# Patient Record
Sex: MALE | Race: WHITE | Employment: OTHER | URBAN - METROPOLITAN AREA
[De-identification: names, ages, dates, MRNs, and addresses within clinical notes are randomized per-mention and may not be internally consistent; named-entity substitution may affect disease eponyms.]

---

## 2017-08-21 ENCOUNTER — HOSPITAL ENCOUNTER (INPATIENT)
Age: 77
LOS: 3 days | Discharge: NURSING, REHAB OR LONG-TERM CARE | DRG: 470 | End: 2017-08-24
Attending: EMERGENCY MEDICINE | Admitting: HOSPITALIST
Payer: MEDICARE

## 2017-08-21 ENCOUNTER — APPOINTMENT (OUTPATIENT)
Dept: GENERAL RADIOLOGY | Age: 77
DRG: 470 | End: 2017-08-21
Attending: EMERGENCY MEDICINE
Payer: MEDICARE

## 2017-08-21 ENCOUNTER — ANESTHESIA (OUTPATIENT)
Dept: SURGERY | Age: 77
DRG: 470 | End: 2017-08-21
Payer: MEDICARE

## 2017-08-21 ENCOUNTER — ANESTHESIA EVENT (OUTPATIENT)
Dept: SURGERY | Age: 77
DRG: 470 | End: 2017-08-21
Payer: MEDICARE

## 2017-08-21 ENCOUNTER — APPOINTMENT (OUTPATIENT)
Dept: GENERAL RADIOLOGY | Age: 77
DRG: 470 | End: 2017-08-21
Attending: ORTHOPAEDIC SURGERY
Payer: MEDICARE

## 2017-08-21 DIAGNOSIS — S72.001A CLOSED RIGHT HIP FRACTURE, INITIAL ENCOUNTER (HCC): Primary | ICD-10-CM

## 2017-08-21 PROBLEM — S72.009A HIP FX (HCC): Status: ACTIVE | Noted: 2017-08-21

## 2017-08-21 LAB
ABO + RH BLD: NORMAL
ANION GAP SERPL CALC-SCNC: 11 MMOL/L (ref 7–16)
BACTERIA SPEC CULT: NORMAL
BASOPHILS # BLD: 0 K/UL (ref 0–0.2)
BASOPHILS NFR BLD: 0 % (ref 0–2)
BLOOD GROUP ANTIBODIES SERPL: NORMAL
BUN SERPL-MCNC: 19 MG/DL (ref 8–23)
CALCIUM SERPL-MCNC: 8.8 MG/DL (ref 8.3–10.4)
CALCIUM SERPL-MCNC: 8.9 MG/DL (ref 8.3–10.4)
CHLORIDE SERPL-SCNC: 111 MMOL/L (ref 98–107)
CO2 SERPL-SCNC: 24 MMOL/L (ref 21–32)
CREAT SERPL-MCNC: 1.12 MG/DL (ref 0.8–1.5)
DIFFERENTIAL METHOD BLD: ABNORMAL
EOSINOPHIL # BLD: 0.1 K/UL (ref 0–0.8)
EOSINOPHIL NFR BLD: 2 % (ref 0.5–7.8)
ERYTHROCYTE [DISTWIDTH] IN BLOOD BY AUTOMATED COUNT: 13.5 % (ref 11.9–14.6)
GLUCOSE SERPL-MCNC: 87 MG/DL (ref 65–100)
HCT VFR BLD AUTO: 37.9 % (ref 41.1–50.3)
HGB BLD-MCNC: 13.6 G/DL (ref 13.6–17.2)
IMM GRANULOCYTES # BLD: 0 K/UL (ref 0–0.5)
IMM GRANULOCYTES NFR BLD: 0.6 % (ref 0–5)
INR PPP: 1 (ref 0.9–1.2)
LYMPHOCYTES # BLD: 1.2 K/UL (ref 0.5–4.6)
LYMPHOCYTES NFR BLD: 18 % (ref 13–44)
MCH RBC QN AUTO: 35.4 PG (ref 26.1–32.9)
MCHC RBC AUTO-ENTMCNC: 35.9 G/DL (ref 31.4–35)
MCV RBC AUTO: 98.7 FL (ref 79.6–97.8)
MONOCYTES # BLD: 0.6 K/UL (ref 0.1–1.3)
MONOCYTES NFR BLD: 10 % (ref 4–12)
NEUTS SEG # BLD: 4.7 K/UL (ref 1.7–8.2)
NEUTS SEG NFR BLD: 69 % (ref 43–78)
PLATELET # BLD AUTO: 238 K/UL (ref 150–450)
PMV BLD AUTO: 9.8 FL (ref 10.8–14.1)
POTASSIUM SERPL-SCNC: 4.3 MMOL/L (ref 3.5–5.1)
PREALB SERPL-MCNC: 25 MG/DL (ref 18–35.7)
PROTHROMBIN TIME: 10.8 SEC (ref 9.6–12)
PTH-INTACT SERPL-MCNC: 55.8 PG/ML (ref 14–72)
RBC # BLD AUTO: 3.84 M/UL (ref 4.23–5.67)
SERVICE CMNT-IMP: NORMAL
SODIUM SERPL-SCNC: 146 MMOL/L (ref 136–145)
SPECIMEN EXP DATE BLD: NORMAL
WBC # BLD AUTO: 6.8 K/UL (ref 4.3–11.1)

## 2017-08-21 PROCEDURE — 83970 ASSAY OF PARATHORMONE: CPT | Performed by: ORTHOPAEDIC SURGERY

## 2017-08-21 PROCEDURE — 85025 COMPLETE CBC W/AUTO DIFF WBC: CPT | Performed by: EMERGENCY MEDICINE

## 2017-08-21 PROCEDURE — 77030020782 HC GWN BAIR PAWS FLX 3M -B: Performed by: ANESTHESIOLOGY

## 2017-08-21 PROCEDURE — 77030018836 HC SOL IRR NACL ICUM -A: Performed by: ORTHOPAEDIC SURGERY

## 2017-08-21 PROCEDURE — 81003 URINALYSIS AUTO W/O SCOPE: CPT | Performed by: EMERGENCY MEDICINE

## 2017-08-21 PROCEDURE — 77030002966 HC SUT PDS J&J -A: Performed by: ORTHOPAEDIC SURGERY

## 2017-08-21 PROCEDURE — 76210000017 HC OR PH I REC 1.5 TO 2 HR: Performed by: ORTHOPAEDIC SURGERY

## 2017-08-21 PROCEDURE — 74011250637 HC RX REV CODE- 250/637: Performed by: EMERGENCY MEDICINE

## 2017-08-21 PROCEDURE — 77030018836 HC SOL IRR NACL ICUM -A

## 2017-08-21 PROCEDURE — 77030007880 HC KT SPN EPDRL BBMI -B: Performed by: ANESTHESIOLOGY

## 2017-08-21 PROCEDURE — L1830 KO IMMOB CANVAS LONG PRE OTS: HCPCS | Performed by: ORTHOPAEDIC SURGERY

## 2017-08-21 PROCEDURE — 74011250637 HC RX REV CODE- 250/637: Performed by: NURSE PRACTITIONER

## 2017-08-21 PROCEDURE — 77030032490 HC SLV COMPR SCD KNE COVD -B

## 2017-08-21 PROCEDURE — 74011000302 HC RX REV CODE- 302: Performed by: NURSE PRACTITIONER

## 2017-08-21 PROCEDURE — 77030003665 HC NDL SPN BBMI -A: Performed by: ANESTHESIOLOGY

## 2017-08-21 PROCEDURE — 82306 VITAMIN D 25 HYDROXY: CPT | Performed by: ORTHOPAEDIC SURGERY

## 2017-08-21 PROCEDURE — 87641 MR-STAPH DNA AMP PROBE: CPT | Performed by: ORTHOPAEDIC SURGERY

## 2017-08-21 PROCEDURE — 85610 PROTHROMBIN TIME: CPT | Performed by: EMERGENCY MEDICINE

## 2017-08-21 PROCEDURE — 77030008467 HC STPLR SKN COVD -B: Performed by: ORTHOPAEDIC SURGERY

## 2017-08-21 PROCEDURE — 71010 XR CHEST SNGL V: CPT

## 2017-08-21 PROCEDURE — 86900 BLOOD TYPING SEROLOGIC ABO: CPT | Performed by: EMERGENCY MEDICINE

## 2017-08-21 PROCEDURE — 74011250636 HC RX REV CODE- 250/636: Performed by: ORTHOPAEDIC SURGERY

## 2017-08-21 PROCEDURE — 77030011640 HC PAD GRND REM COVD -A: Performed by: ORTHOPAEDIC SURGERY

## 2017-08-21 PROCEDURE — 77030010507 HC ADH SKN DERMBND J&J -B: Performed by: ORTHOPAEDIC SURGERY

## 2017-08-21 PROCEDURE — 76010000149 HC OR TIME 1 TO 1.5 HR: Performed by: ORTHOPAEDIC SURGERY

## 2017-08-21 PROCEDURE — 74011250637 HC RX REV CODE- 250/637: Performed by: ORTHOPAEDIC SURGERY

## 2017-08-21 PROCEDURE — 74011000250 HC RX REV CODE- 250

## 2017-08-21 PROCEDURE — 84134 ASSAY OF PREALBUMIN: CPT | Performed by: ORTHOPAEDIC SURGERY

## 2017-08-21 PROCEDURE — 77030018673: Performed by: ORTHOPAEDIC SURGERY

## 2017-08-21 PROCEDURE — 86580 TB INTRADERMAL TEST: CPT | Performed by: NURSE PRACTITIONER

## 2017-08-21 PROCEDURE — 73552 X-RAY EXAM OF FEMUR 2/>: CPT

## 2017-08-21 PROCEDURE — C1776 JOINT DEVICE (IMPLANTABLE): HCPCS | Performed by: ORTHOPAEDIC SURGERY

## 2017-08-21 PROCEDURE — 77030013727 HC IRR FAN PULSVC ZIMM -B: Performed by: ORTHOPAEDIC SURGERY

## 2017-08-21 PROCEDURE — 74011250636 HC RX REV CODE- 250/636

## 2017-08-21 PROCEDURE — 77030002933 HC SUT MCRYL J&J -A: Performed by: ORTHOPAEDIC SURGERY

## 2017-08-21 PROCEDURE — 77030006835 HC BLD SAW SAG STRY -B: Performed by: ORTHOPAEDIC SURGERY

## 2017-08-21 PROCEDURE — 77030005518 HC CATH URETH FOL 2W BARD -B

## 2017-08-21 PROCEDURE — 0SRR0JA REPLACEMENT OF RIGHT HIP JOINT, FEMORAL SURFACE WITH SYNTHETIC SUBSTITUTE, UNCEMENTED, OPEN APPROACH: ICD-10-PCS | Performed by: ORTHOPAEDIC SURGERY

## 2017-08-21 PROCEDURE — 77030036696 HC BOOT TRACT BUCKS S2SG -A

## 2017-08-21 PROCEDURE — 99284 EMERGENCY DEPT VISIT MOD MDM: CPT | Performed by: EMERGENCY MEDICINE

## 2017-08-21 PROCEDURE — 74011250636 HC RX REV CODE- 250/636: Performed by: NURSE PRACTITIONER

## 2017-08-21 PROCEDURE — 77030002937 HC SUT MERS J&J -B: Performed by: ORTHOPAEDIC SURGERY

## 2017-08-21 PROCEDURE — 80048 BASIC METABOLIC PNL TOTAL CA: CPT | Performed by: EMERGENCY MEDICINE

## 2017-08-21 PROCEDURE — 76060000034 HC ANESTHESIA 1.5 TO 2 HR: Performed by: ORTHOPAEDIC SURGERY

## 2017-08-21 PROCEDURE — 77030029883 HC RETRV SUT ARTHSCP HOFFE BEAT -B: Performed by: ORTHOPAEDIC SURGERY

## 2017-08-21 PROCEDURE — 73502 X-RAY EXAM HIP UNI 2-3 VIEWS: CPT

## 2017-08-21 PROCEDURE — 65270000029 HC RM PRIVATE

## 2017-08-21 DEVICE — STEM FEM SZ 15 L165MM NK L40.3MM 50MM HI OFFSET 125DEG HIP: Type: IMPLANTABLE DEVICE | Site: FEMUR | Status: FUNCTIONAL

## 2017-08-21 DEVICE — HEAD BPLR OD53MM ID28MM FEM HIP SELF CNTR: Type: IMPLANTABLE DEVICE | Site: FEMUR | Status: FUNCTIONAL

## 2017-08-21 DEVICE — HEAD FEM DIA28MM +5MM OFFSET STD 12/14 TAPR ARTC EZ HIP MTL: Type: IMPLANTABLE DEVICE | Site: FEMUR | Status: FUNCTIONAL

## 2017-08-21 RX ORDER — SODIUM CHLORIDE 9 MG/ML
2000 INJECTION, SOLUTION INTRAVENOUS CONTINUOUS
Status: DISCONTINUED | OUTPATIENT
Start: 2017-08-21 | End: 2017-08-22

## 2017-08-21 RX ORDER — CEFAZOLIN SODIUM IN 0.9 % NACL 2 G/50 ML
2 INTRAVENOUS SOLUTION, PIGGYBACK (ML) INTRAVENOUS
Status: DISCONTINUED | OUTPATIENT
Start: 2017-08-21 | End: 2017-08-21 | Stop reason: SDUPTHER

## 2017-08-21 RX ORDER — DIPHENHYDRAMINE HYDROCHLORIDE 50 MG/ML
12.5 INJECTION, SOLUTION INTRAMUSCULAR; INTRAVENOUS
Status: DISCONTINUED | OUTPATIENT
Start: 2017-08-21 | End: 2017-08-21 | Stop reason: HOSPADM

## 2017-08-21 RX ORDER — NALOXONE HYDROCHLORIDE 0.4 MG/ML
0.1 INJECTION, SOLUTION INTRAMUSCULAR; INTRAVENOUS; SUBCUTANEOUS
Status: DISCONTINUED | OUTPATIENT
Start: 2017-08-21 | End: 2017-08-21 | Stop reason: HOSPADM

## 2017-08-21 RX ORDER — HYDROMORPHONE HYDROCHLORIDE 2 MG/ML
0.5 INJECTION, SOLUTION INTRAMUSCULAR; INTRAVENOUS; SUBCUTANEOUS
Status: DISCONTINUED | OUTPATIENT
Start: 2017-08-21 | End: 2017-08-21 | Stop reason: HOSPADM

## 2017-08-21 RX ORDER — MIDAZOLAM HYDROCHLORIDE 1 MG/ML
2 INJECTION, SOLUTION INTRAMUSCULAR; INTRAVENOUS
Status: CANCELLED | OUTPATIENT
Start: 2017-08-21

## 2017-08-21 RX ORDER — FERROUS SULFATE, DRIED 160(50) MG
1 TABLET, EXTENDED RELEASE ORAL
Status: DISCONTINUED | OUTPATIENT
Start: 2017-08-22 | End: 2017-08-24 | Stop reason: HOSPADM

## 2017-08-21 RX ORDER — ACETAMINOPHEN 325 MG/1
650 TABLET ORAL EVERY 8 HOURS
Status: DISCONTINUED | OUTPATIENT
Start: 2017-08-21 | End: 2017-08-21 | Stop reason: HOSPADM

## 2017-08-21 RX ORDER — SODIUM CHLORIDE, SODIUM LACTATE, POTASSIUM CHLORIDE, CALCIUM CHLORIDE 600; 310; 30; 20 MG/100ML; MG/100ML; MG/100ML; MG/100ML
75 INJECTION, SOLUTION INTRAVENOUS CONTINUOUS
Status: DISCONTINUED | OUTPATIENT
Start: 2017-08-21 | End: 2017-08-21 | Stop reason: HOSPADM

## 2017-08-21 RX ORDER — TRAMADOL HYDROCHLORIDE 50 MG/1
50 TABLET ORAL
Status: DISCONTINUED | OUTPATIENT
Start: 2017-08-21 | End: 2017-08-21 | Stop reason: SDUPTHER

## 2017-08-21 RX ORDER — SODIUM CHLORIDE 0.9 % (FLUSH) 0.9 %
5-10 SYRINGE (ML) INJECTION AS NEEDED
Status: DISCONTINUED | OUTPATIENT
Start: 2017-08-21 | End: 2017-08-21 | Stop reason: HOSPADM

## 2017-08-21 RX ORDER — MAG HYDROX/ALUMINUM HYD/SIMETH 200-200-20
30 SUSPENSION, ORAL (FINAL DOSE FORM) ORAL
Status: DISCONTINUED | OUTPATIENT
Start: 2017-08-21 | End: 2017-08-24 | Stop reason: HOSPADM

## 2017-08-21 RX ORDER — ONDANSETRON 2 MG/ML
4 INJECTION INTRAMUSCULAR; INTRAVENOUS
Status: DISCONTINUED | OUTPATIENT
Start: 2017-08-21 | End: 2017-08-21 | Stop reason: HOSPADM

## 2017-08-21 RX ORDER — MORPHINE SULFATE 2 MG/ML
4 INJECTION, SOLUTION INTRAMUSCULAR; INTRAVENOUS ONCE
Status: COMPLETED | OUTPATIENT
Start: 2017-08-21 | End: 2017-08-21

## 2017-08-21 RX ORDER — RANOLAZINE 500 MG/1
1000 TABLET, EXTENDED RELEASE ORAL EVERY 12 HOURS
Status: DISCONTINUED | OUTPATIENT
Start: 2017-08-21 | End: 2017-08-24 | Stop reason: HOSPADM

## 2017-08-21 RX ORDER — HYDROMORPHONE HYDROCHLORIDE 1 MG/ML
0.5 INJECTION, SOLUTION INTRAMUSCULAR; INTRAVENOUS; SUBCUTANEOUS
Status: DISCONTINUED | OUTPATIENT
Start: 2017-08-21 | End: 2017-08-24 | Stop reason: HOSPADM

## 2017-08-21 RX ORDER — SODIUM CHLORIDE, SODIUM LACTATE, POTASSIUM CHLORIDE, CALCIUM CHLORIDE 600; 310; 30; 20 MG/100ML; MG/100ML; MG/100ML; MG/100ML
75 INJECTION, SOLUTION INTRAVENOUS CONTINUOUS
Status: DISCONTINUED | OUTPATIENT
Start: 2017-08-21 | End: 2017-08-22

## 2017-08-21 RX ORDER — ONDANSETRON 2 MG/ML
4 INJECTION INTRAMUSCULAR; INTRAVENOUS
Status: DISCONTINUED | OUTPATIENT
Start: 2017-08-21 | End: 2017-08-22 | Stop reason: SDUPTHER

## 2017-08-21 RX ORDER — LIDOCAINE HYDROCHLORIDE 10 MG/ML
0.1 INJECTION INFILTRATION; PERINEURAL AS NEEDED
Status: DISCONTINUED | OUTPATIENT
Start: 2017-08-21 | End: 2017-08-21 | Stop reason: HOSPADM

## 2017-08-21 RX ORDER — SODIUM CHLORIDE 0.9 % (FLUSH) 0.9 %
5-10 SYRINGE (ML) INJECTION AS NEEDED
Status: DISCONTINUED | OUTPATIENT
Start: 2017-08-21 | End: 2017-08-24 | Stop reason: HOSPADM

## 2017-08-21 RX ORDER — ACETAMINOPHEN 325 MG/1
650 TABLET ORAL EVERY 8 HOURS
Status: DISCONTINUED | OUTPATIENT
Start: 2017-08-22 | End: 2017-08-22 | Stop reason: SDUPTHER

## 2017-08-21 RX ORDER — BUPIVACAINE HYDROCHLORIDE 7.5 MG/ML
INJECTION, SOLUTION INTRASPINAL AS NEEDED
Status: DISCONTINUED | OUTPATIENT
Start: 2017-08-21 | End: 2017-08-21 | Stop reason: HOSPADM

## 2017-08-21 RX ORDER — TRAMADOL HYDROCHLORIDE 50 MG/1
50 TABLET ORAL
Status: DISCONTINUED | OUTPATIENT
Start: 2017-08-21 | End: 2017-08-24 | Stop reason: HOSPADM

## 2017-08-21 RX ORDER — SODIUM CHLORIDE, SODIUM LACTATE, POTASSIUM CHLORIDE, CALCIUM CHLORIDE 600; 310; 30; 20 MG/100ML; MG/100ML; MG/100ML; MG/100ML
75 INJECTION, SOLUTION INTRAVENOUS CONTINUOUS
Status: CANCELLED | OUTPATIENT
Start: 2017-08-21 | End: 2017-08-22

## 2017-08-21 RX ORDER — PROPOFOL 10 MG/ML
INJECTION, EMULSION INTRAVENOUS AS NEEDED
Status: DISCONTINUED | OUTPATIENT
Start: 2017-08-21 | End: 2017-08-21 | Stop reason: HOSPADM

## 2017-08-21 RX ORDER — CEFAZOLIN SODIUM IN 0.9 % NACL 2 G/50 ML
2 INTRAVENOUS SOLUTION, PIGGYBACK (ML) INTRAVENOUS
Status: COMPLETED | OUTPATIENT
Start: 2017-08-21 | End: 2017-08-21

## 2017-08-21 RX ORDER — PROPOFOL 10 MG/ML
INJECTION, EMULSION INTRAVENOUS
Status: DISCONTINUED | OUTPATIENT
Start: 2017-08-21 | End: 2017-08-21 | Stop reason: HOSPADM

## 2017-08-21 RX ORDER — ATORVASTATIN CALCIUM 40 MG/1
80 TABLET, FILM COATED ORAL DAILY
Status: DISCONTINUED | OUTPATIENT
Start: 2017-08-22 | End: 2017-08-24 | Stop reason: HOSPADM

## 2017-08-21 RX ORDER — OXYCODONE HYDROCHLORIDE 5 MG/1
10 TABLET ORAL
Status: DISCONTINUED | OUTPATIENT
Start: 2017-08-21 | End: 2017-08-21 | Stop reason: HOSPADM

## 2017-08-21 RX ORDER — ONDANSETRON 2 MG/ML
4 INJECTION INTRAMUSCULAR; INTRAVENOUS
Status: DISCONTINUED | OUTPATIENT
Start: 2017-08-21 | End: 2017-08-24 | Stop reason: HOSPADM

## 2017-08-21 RX ORDER — OXYCODONE HYDROCHLORIDE 5 MG/1
5 TABLET ORAL
Status: DISCONTINUED | OUTPATIENT
Start: 2017-08-21 | End: 2017-08-21 | Stop reason: HOSPADM

## 2017-08-21 RX ORDER — SODIUM CHLORIDE 0.9 % (FLUSH) 0.9 %
5-10 SYRINGE (ML) INJECTION EVERY 8 HOURS
Status: DISCONTINUED | OUTPATIENT
Start: 2017-08-21 | End: 2017-08-21 | Stop reason: HOSPADM

## 2017-08-21 RX ORDER — ACETAMINOPHEN 325 MG/1
650 TABLET ORAL EVERY 8 HOURS
Status: DISCONTINUED | OUTPATIENT
Start: 2017-08-21 | End: 2017-08-24 | Stop reason: HOSPADM

## 2017-08-21 RX ORDER — ENOXAPARIN SODIUM 100 MG/ML
30 INJECTION SUBCUTANEOUS EVERY 24 HOURS
Status: DISCONTINUED | OUTPATIENT
Start: 2017-08-22 | End: 2017-08-24 | Stop reason: HOSPADM

## 2017-08-21 RX ORDER — FLUMAZENIL 0.1 MG/ML
0.2 INJECTION INTRAVENOUS AS NEEDED
Status: DISCONTINUED | OUTPATIENT
Start: 2017-08-21 | End: 2017-08-21 | Stop reason: HOSPADM

## 2017-08-21 RX ORDER — SODIUM CHLORIDE 0.9 % (FLUSH) 0.9 %
5-10 SYRINGE (ML) INJECTION AS NEEDED
Status: DISCONTINUED | OUTPATIENT
Start: 2017-08-21 | End: 2017-08-22 | Stop reason: SDUPTHER

## 2017-08-21 RX ORDER — SODIUM CHLORIDE, SODIUM LACTATE, POTASSIUM CHLORIDE, CALCIUM CHLORIDE 600; 310; 30; 20 MG/100ML; MG/100ML; MG/100ML; MG/100ML
INJECTION, SOLUTION INTRAVENOUS
Status: DISCONTINUED | OUTPATIENT
Start: 2017-08-21 | End: 2017-08-21 | Stop reason: HOSPADM

## 2017-08-21 RX ORDER — SODIUM CHLORIDE 0.9 % (FLUSH) 0.9 %
5-10 SYRINGE (ML) INJECTION EVERY 8 HOURS
Status: DISCONTINUED | OUTPATIENT
Start: 2017-08-21 | End: 2017-08-24 | Stop reason: HOSPADM

## 2017-08-21 RX ORDER — SODIUM CHLORIDE, SODIUM LACTATE, POTASSIUM CHLORIDE, CALCIUM CHLORIDE 600; 310; 30; 20 MG/100ML; MG/100ML; MG/100ML; MG/100ML
75 INJECTION, SOLUTION INTRAVENOUS
Status: COMPLETED | OUTPATIENT
Start: 2017-08-21 | End: 2017-08-21

## 2017-08-21 RX ORDER — DEXTROSE MONOHYDRATE 50 MG/ML
75 INJECTION, SOLUTION INTRAVENOUS CONTINUOUS
Status: DISCONTINUED | OUTPATIENT
Start: 2017-08-21 | End: 2017-08-22

## 2017-08-21 RX ORDER — METOPROLOL SUCCINATE 25 MG/1
12.5 TABLET, EXTENDED RELEASE ORAL DAILY
Status: DISCONTINUED | OUTPATIENT
Start: 2017-08-22 | End: 2017-08-24 | Stop reason: HOSPADM

## 2017-08-21 RX ORDER — OXYCODONE HYDROCHLORIDE 5 MG/1
5 TABLET ORAL
Status: DISCONTINUED | OUTPATIENT
Start: 2017-08-21 | End: 2017-08-24 | Stop reason: HOSPADM

## 2017-08-21 RX ORDER — PANTOPRAZOLE SODIUM 40 MG/1
40 TABLET, DELAYED RELEASE ORAL
Status: DISCONTINUED | OUTPATIENT
Start: 2017-08-22 | End: 2017-08-24 | Stop reason: HOSPADM

## 2017-08-21 RX ORDER — SODIUM CHLORIDE 0.9 % (FLUSH) 0.9 %
5-10 SYRINGE (ML) INJECTION EVERY 8 HOURS
Status: DISCONTINUED | OUTPATIENT
Start: 2017-08-21 | End: 2017-08-22 | Stop reason: SDUPTHER

## 2017-08-21 RX ORDER — OXYCODONE HYDROCHLORIDE 5 MG/1
5 TABLET ORAL
Status: DISCONTINUED | OUTPATIENT
Start: 2017-08-21 | End: 2017-08-22

## 2017-08-21 RX ADMIN — BUPIVACAINE HYDROCHLORIDE 1.9 ML: 7.5 INJECTION, SOLUTION INTRASPINAL at 17:25

## 2017-08-21 RX ADMIN — ACETAMINOPHEN 650 MG: 325 TABLET ORAL at 21:09

## 2017-08-21 RX ADMIN — MORPHINE SULFATE 4 MG: 2 INJECTION, SOLUTION INTRAMUSCULAR; INTRAVENOUS at 14:36

## 2017-08-21 RX ADMIN — PROPOFOL 40 MG: 10 INJECTION, EMULSION INTRAVENOUS at 17:22

## 2017-08-21 RX ADMIN — TUBERCULIN PURIFIED PROTEIN DERIVATIVE 5 UNITS: 5 INJECTION INTRADERMAL at 16:03

## 2017-08-21 RX ADMIN — Medication 5 ML: at 21:12

## 2017-08-21 RX ADMIN — CEFAZOLIN 2 G: 1 INJECTION, POWDER, FOR SOLUTION INTRAMUSCULAR; INTRAVENOUS; PARENTERAL at 17:24

## 2017-08-21 RX ADMIN — ONDANSETRON 4 MG: 2 INJECTION INTRAMUSCULAR; INTRAVENOUS at 22:20

## 2017-08-21 RX ADMIN — RANOLAZINE 1000 MG: 500 TABLET, FILM COATED, EXTENDED RELEASE ORAL at 21:09

## 2017-08-21 RX ADMIN — SODIUM CHLORIDE, SODIUM LACTATE, POTASSIUM CHLORIDE, CALCIUM CHLORIDE: 600; 310; 30; 20 INJECTION, SOLUTION INTRAVENOUS at 17:30

## 2017-08-21 RX ADMIN — OXYCODONE HYDROCHLORIDE 5 MG: 5 TABLET ORAL at 16:04

## 2017-08-21 RX ADMIN — Medication 5 ML: at 16:04

## 2017-08-21 RX ADMIN — SODIUM CHLORIDE 2000 ML: 900 INJECTION, SOLUTION INTRAVENOUS at 16:07

## 2017-08-21 RX ADMIN — SODIUM CHLORIDE, SODIUM LACTATE, POTASSIUM CHLORIDE, CALCIUM CHLORIDE: 600; 310; 30; 20 INJECTION, SOLUTION INTRAVENOUS at 17:16

## 2017-08-21 RX ADMIN — PROPOFOL 50 MCG/KG/MIN: 10 INJECTION, EMULSION INTRAVENOUS at 17:23

## 2017-08-21 RX ADMIN — SODIUM CHLORIDE, SODIUM LACTATE, POTASSIUM CHLORIDE, AND CALCIUM CHLORIDE 75 ML/HR: 600; 310; 30; 20 INJECTION, SOLUTION INTRAVENOUS at 17:06

## 2017-08-21 RX ADMIN — SODIUM CHLORIDE 1000 ML: 900 INJECTION, SOLUTION INTRAVENOUS at 14:36

## 2017-08-21 RX ADMIN — ACETAMINOPHEN 650 MG: 325 TABLET ORAL at 16:04

## 2017-08-21 NOTE — ED PROVIDER NOTES
HPI Comments: 69year-old white male presents with right hip pain. He states that he was opening his car radiator when it spewed water causing him to fall onto his right hip. Since then he has had pain with movement and has been unable to bear weight. No head trauma. No neck pain or back pain. No other complaints at this time. No history of hip fractures. Patient is a 69 y.o. male presenting with hip pain. The history is provided by the patient. Hip Injury    Pertinent negatives include no back pain and no neck pain. 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    CAD (coronary artery disease)     Coronary atherosclerosis of native coronary artery 11/2/2015    Dyspnea/Shortness of breath 11/2/2015    Elevated prostate specific antigen (PSA)     Esophageal reflux     Extremity atherosclerosis with intermittent claudication ([facility] [address] 11/2/2015    Heart disease, unspecified     Hyperlipidemia 11/2/2015    Hypertrophy of prostate with urinary obstruction and other lower urinary tract symptoms (LUTS)     Pure hypercholesterolemia        Past Surgical History:   Procedure Laterality Date    HX CHOLECYSTECTOMY      HX CORONARY ARTERY BYPASS GRAFT      HX KNEE REPLACEMENT Left     total    HX ORTHOPAEDIC      knee surgury         Family History:   Problem Relation Age of Onset    Cancer Mother     Cancer Father     Heart Disease Father     Elevated Lipids Father        Social History     Social History    Marital status:      Spouse name: N/A    Number of children: N/A    Years of education: N/A     Occupational History    Not on file.      Social History Main Topics    Smoking status: Former Smoker     Quit date: 1/1/1972    Smokeless tobacco: Not on file    Alcohol use No    Drug use: Not on file    Sexual activity: Not on file     Other Topics Concern    Not on file     Social History Narrative         ALLERGIES: Phenergan [promethazine]    Review of Systems   Constitutional: Negative for fever. HENT: Negative for congestion. Respiratory: Negative for cough and shortness of breath. Cardiovascular: Negative for chest pain. Gastrointestinal: Negative for abdominal pain, nausea and vomiting. Genitourinary: Negative for dysuria. Musculoskeletal: Negative for back pain and neck pain. Skin: Negative for rash. Neurological: Negative for headaches. Vitals:    08/21/17 1047   BP: 134/79   Pulse: 69   Resp: 18   Temp: 98.2 °F (36.8 °C)   SpO2: 96%   Weight: 88.5 kg (195 lb)   Height: 5' 8\" (1.727 m)            Physical Exam   Constitutional: He is oriented to person, place, and time. He appears well-developed and well-nourished. No distress. HENT:   Head: Normocephalic and atraumatic. Mouth/Throat: Oropharynx is clear and moist.   Eyes: Conjunctivae are normal. Pupils are equal, round, and reactive to light. Neck: Normal range of motion. Cardiovascular: Normal rate and regular rhythm. No murmur heard. Pulmonary/Chest: Effort normal and breath sounds normal. He has no wheezes. Abdominal: Soft. He exhibits no distension. There is no tenderness. Musculoskeletal:   Right hip has decreased range of motion. Right leg is approximately 2 centimeters shorter than the left. Good distal pulses and sensation. Neurological: He is alert and oriented to person, place, and time. He exhibits normal muscle tone. Coordination normal.   Skin: Skin is warm and dry. Psychiatric: He has a normal mood and affect. Nursing note and vitals reviewed. MDM  Number of Diagnoses or Management Options  Diagnosis management comments: X-ray confirms right femoral neck fracture. Preoperative lab work unremarkable. EKG shows normal sinus rhythm with occasional PVC. Preoperative chest x-ray is normal.  Patient has declined further pain medicine while being observed in the emergency department.   Orthopedics and hospitalist have been consulted.        Amount and/or Complexity of Data Reviewed  Clinical lab tests: ordered and reviewed  Tests in the radiology section of CPT®: ordered and reviewed  Discuss the patient with other providers: yes  Independent visualization of images, tracings, or specimens: yes    Risk of Complications, Morbidity, and/or Mortality  Presenting problems: moderate  Diagnostic procedures: moderate  Management options: moderate      ED Course       Procedures

## 2017-08-21 NOTE — ANESTHESIA POSTPROCEDURE EVALUATION
Post-Anesthesia Evaluation and Assessment    Patient: Alirio Carpio MRN: [id number]  SSN: [id number]    YOB: 1940  Age: 69 y.o. Sex: male       Cardiovascular Function/Vital Signs  Visit Vitals    /58    Pulse 77    Temp 36.6 °C (97.8 °F)    Resp 12    Ht 5' 8\" (1.727 m)    Wt 88.5 kg (195 lb)    SpO2 90%    BMI 29.65 kg/m2       Patient is status post spinal anesthesia for Procedure(s):  Right HIP HEMIARTHROPLASTY. Nausea/Vomiting: None    Postoperative hydration reviewed and adequate. Pain:  Pain Scale 1: Numeric (0 - 10) (08/21/17 1905)  Pain Intensity 1: 0 (08/21/17 1905)   Managed    Neurological Status: At baseline    Mental Status and Level of Consciousness: Arousable    Pulmonary Status:   O2 Device: Room air (08/21/17 1905)   Adequate oxygenation and airway patent    Complications related to anesthesia: None    Post-anesthesia assessment completed.  No concerns    Signed By: Valentino Katz MD     August 21, 2017

## 2017-08-21 NOTE — ED TRIAGE NOTES
Pt arrives per EMS after ground level fall. Complains of right hip pain. Pt can move leg but causes pain.

## 2017-08-21 NOTE — PROGRESS NOTES
ORTHO:    PATIENT TO BE ADMITTED BY HOSPITALIST FOR RIGHT HIP FRACTURE TO ROOM 723. SURGERY SCHEDULED FOR TODAY WITH DR. AMADOR IF CLEARED FOR SURGERY. PLEASE KEEP NPO.

## 2017-08-21 NOTE — ANESTHESIA PROCEDURE NOTES
Spinal Block    Start time: 8/21/2017 5:22 PM  End time: 8/21/2017 5:26 PM  Performed by: Tresa Marion  Authorized by: Tresa Marion     Pre-procedure:   Indications: primary anesthetic  Preanesthetic Checklist: patient identified, risks and benefits discussed, anesthesia consent, site marked, patient being monitored and timeout performed    Timeout Time: 17:21          Spinal Block:   Patient Position:  Right lateral decubitus  Prep Region:  Lumbar  Prep: chlorhexidine      Location:  L3-4  Technique:  Single shot  Local:  Lidocaine 1%  Local Dose (mL):  3    Needle:   Needle Type:  Pencil-tip  Needle Gauge:  25 G  Attempts:  1      Events: CSF confirmed, no blood with aspiration and no paresthesia        Assessment:  Insertion:  Uncomplicated  Patient tolerance:  Patient tolerated the procedure well with no immediate complications

## 2017-08-21 NOTE — PROGRESS NOTES
TRANSFER - OUT REPORT:    Verbal report given to [facility], rn(name) on Rosie Arias  being transferred to Pre-Op(unit) for routine progression of care       Report consisted of patients Situation, Background, Assessment and   Recommendations(SBAR). Information from the following report(s) Kardex was reviewed with the receiving nurse. Lines:       Opportunity for questions and clarification was provided.       Patient transported with:  Family, IV fluids

## 2017-08-21 NOTE — PROGRESS NOTES
TRANSFER - IN REPORT:    Verbal report received from Pepe RN(name) on [facility]  being received from ER(unit) for routine progression of care      Report consisted of patients Situation, Background, Assessment and   Recommendations(SBAR). Information from the following report(s) Kardex was reviewed with the receiving nurse. Opportunity for questions and clarification was provided. Assessment completed upon patients arrival to unit and care assumed.

## 2017-08-21 NOTE — ANESTHESIA PREPROCEDURE EVALUATION
Anesthetic History   No history of anesthetic complications            Review of Systems / Medical History  Patient summary reviewed and pertinent labs reviewed    Pulmonary  Within defined limits                 Neuro/Psych   Within defined limits           Cardiovascular              CAD, cardiac stents (multiple RIAN after CABG (none in last several years) - remains on bASA), CABG (2004) and hyperlipidemia    Exercise tolerance: >4 METS  Comments: Unremarkable echo and perfusion test 2016   GI/Hepatic/Renal     GERD: well controlled           Endo/Other        Arthritis     Other Findings              Physical Exam    Airway  Mallampati: II  TM Distance: > 6 cm  Neck ROM: normal range of motion   Mouth opening: Normal     Cardiovascular    Rhythm: regular  Rate: normal         Dental    Dentition: Full upper dentures     Pulmonary  Breath sounds clear to auscultation               Abdominal         Other Findings            Anesthetic Plan    ASA: 3  Anesthesia type: spinal            Anesthetic plan and risks discussed with: Patient and Spouse

## 2017-08-21 NOTE — PERIOP NOTES
TRANSFER - IN REPORT:    Verbal report received from GRADY Nicole on [employer]  being received from 7th floor for ordered procedure      Report consisted of patients Situation, Background, Assessment and   Recommendations(SBAR). Information from the following report(s) SBAR, Procedure Summary, Intake/Output, MAR and Recent Results was reviewed with the receiving nurse. Opportunity for questions and clarification was provided. Assessment completed upon patients arrival to unit and care assumed.

## 2017-08-21 NOTE — H&P
History & Physcial   NAME:  Yuliana Doss   Age:  69 y.o.  :   1940   MRN:   [id number]  PCP: Roula Lozano MD  Treatment Team: Attending Provider: Иван Hauser MD; Consulting Provider: Lan Oppenheim, MD; Consulting Provider: Lottie Matthews MD  HPI:   Mr. Giacomo Ashton is a 70 yo male who presented after falling at home landing on his right hip. Pt reports was working on his car and opened his radiator cap and fell backwards trying to avoid getting burned. Pt found to have a right femoral neck fx. Pt with hx of HTN, CABG X4, bilateral knee replacements, cholecystectomy. Last echo 2016 with EF 50-55%. Reports hx of difficulty with anesthesia, severe vomiting, difficulty weaning from vent. Pt denies LOC, hitting head, syncope, dizziness, n/v/d, CP, SOB. Results summary of Diagnostic Studies/Procedures copied from within [facility] EMR:   History: Preoperative evaluation for right hip fracture     Exam: portable chest     Comparison: None     Findings: Median sternotomy wires are present. No focal alveolar infiltrate or  pleural effusion. Linear calcified density seen along the right basilar pleural  margin. No pneumothorax.     Impressions: No acute abnormality. History: Hip fracture. Fall today with right hip pain     EXAM: Multiple views right femur     FINDINGS: There is a basilar fracture of the right femoral neck. There is a  right total knee arthroplasty present. No additional fracture of the right femur  identified.     IMPRESSION  IMPRESSION: Right femoral neck fracture as described. History: Right hip pain after fall today     Right hip series     FINDINGS: There is a right femoral neck fracture. This involves the base of the  femoral neck. There is cortical disruption. There is osteopenia.     IMPRESSION  IMPRESSION: Basilar right femoral neck fracture.       Complete ROS done and is as stated in HPI or otherwise negative  Past Medical History:   Diagnosis Date    Arteriosclerosis of coronary artery bypass graft 2015    CAD (coronary artery disease)     Coronary atherosclerosis of native coronary artery 2015    Dyspnea/Shortness of breath 2015    Elevated prostate specific antigen (PSA)     Esophageal reflux     Extremity atherosclerosis with intermittent claudication ([facility] [address] 2015    Heart disease, unspecified     Hyperlipidemia 2015    Hypertrophy of prostate with urinary obstruction and other lower urinary tract symptoms (LUTS)     Pure hypercholesterolemia       Past Surgical History:   Procedure Laterality Date    HX CHOLECYSTECTOMY      HX CORONARY ARTERY BYPASS GRAFT      HX KNEE REPLACEMENT Left     total    HX ORTHOPAEDIC      knee surgury      Allergies   Allergen Reactions    Phenergan [Promethazine] Unknown (comments)      Social History   Substance Use Topics    Smoking status: Former Smoker     Quit date: 1972    Smokeless tobacco: Not on file    Alcohol use No      Family History   Problem Relation Age of Onset    Cancer Mother     Cancer Father     Heart Disease Father     Elevated Lipids Father         Objective:     Visit Vitals    /78    Pulse 72    Temp 98 °F (36.7 °C)    Resp 17    Ht 5' 8\" (1.727 m)    Wt 88.5 kg (195 lb)    SpO2 95%    BMI 29.65 kg/m2      Temp (24hrs), Av.1 °F (36.7 °C), Min:98 °F (36.7 °C), Max:98.2 °F (36.8 °C)    Oxygen Therapy  O2 Sat (%): 95 % (17 1500)  O2 Device: Room air (17 1500)  Physical Exam:  General:    Alert, cooperative, no distress, appears stated age. Head:   Normocephalic, without obvious abnormality, atraumatic. Nose:  Nares normal. No drainage or sinus tenderness. Lungs:   CTA, resp even and nonlabored  Heart:  S1S2 present without murmurs rubs gallops. RRR. No edema  Abdomen:   Soft, non-tender. Not distended. Bowel sounds normal. No masses  Extremities: No cyanosis. RLE shortened.   2+ pedal pulses bilaterally. Skin:     Texture, turgor normal. No rashes or lesions. Not Jaundiced  Neurologic: Alert and oriented X4. No focal deficits      Data Review:   Recent Results (from the past 24 hour(s))   CBC WITH AUTOMATED DIFF    Collection Time: 08/21/17 12:00 PM   Result Value Ref Range    WBC 6.8 4.3 - 11.1 K/uL    RBC 3.84 (L) 4.23 - 5.67 M/uL    HGB 13.6 13.6 - 17.2 g/dL    HCT 37.9 (L) 41.1 - 50.3 %    MCV 98.7 (H) 79.6 - 97.8 FL    MCH 35.4 (H) 26.1 - 32.9 PG    MCHC 35.9 (H) 31.4 - 35.0 g/dL    RDW 13.5 11.9 - 14.6 %    PLATELET 554 188 - 252 K/uL    MPV 9.8 (L) 10.8 - 14.1 FL    DF AUTOMATED      NEUTROPHILS 69 43 - 78 %    LYMPHOCYTES 18 13 - 44 %    MONOCYTES 10 4.0 - 12.0 %    EOSINOPHILS 2 0.5 - 7.8 %    BASOPHILS 0 0.0 - 2.0 %    IMMATURE GRANULOCYTES 0.6 0.0 - 5.0 %    ABS. NEUTROPHILS 4.7 1.7 - 8.2 K/UL    ABS. LYMPHOCYTES 1.2 0.5 - 4.6 K/UL    ABS. MONOCYTES 0.6 0.1 - 1.3 K/UL    ABS. EOSINOPHILS 0.1 0.0 - 0.8 K/UL    ABS. BASOPHILS 0.0 0.0 - 0.2 K/UL    ABS. IMM.  GRANS. 0.0 0.0 - 0.5 K/UL   METABOLIC PANEL, BASIC    Collection Time: 08/21/17 12:00 PM   Result Value Ref Range    Sodium 146 (H) 136 - 145 mmol/L    Potassium 4.3 3.5 - 5.1 mmol/L    Chloride 111 (H) 98 - 107 mmol/L    CO2 24 21 - 32 mmol/L    Anion gap 11 7 - 16 mmol/L    Glucose 87 65 - 100 mg/dL    BUN 19 8 - 23 MG/DL    Creatinine 1.12 0.8 - 1.5 MG/DL    GFR est AA >60 >60 ml/min/1.73m2    GFR est non-AA >60 >60 ml/min/1.73m2    Calcium 8.9 8.3 - 10.4 MG/DL   PROTHROMBIN TIME + INR    Collection Time: 08/21/17 12:00 PM   Result Value Ref Range    Prothrombin time 10.8 9.6 - 12.0 sec    INR 1.0 0.9 - 1.2     TYPE & SCREEN    Collection Time: 08/21/17 12:00 PM   Result Value Ref Range    Crossmatch Expiration 08/24/2017     ABO/Rh(D) Otto Luanne POSITIVE     Antibody screen NEG    PREALBUMIN    Collection Time: 08/21/17 12:00 PM   Result Value Ref Range    Prealbumin 25.0 18.0 - 35.7 MG/DL   PTH INTACT    Collection Time: 08/21/17 12:00 PM Result Value Ref Range    Calcium 8.8 8.3 - 10.4 MG/DL    PTH, Intact 55.8 14.0 - 72.0 pg/mL       Assessment and Plan: Active Hospital Problems    Diagnosis Date Noted    Hip fx ([facility] [address] 08/21/2017     Admit to Ortho  Consult Ortho  PT/OT  Follow CBC  Ancef on call to OR  D5, slightly hypernatremic  Resume selected home meds        At this point pt is medically optimized for surgery, ok to proceed.         Code Full  Notes, labs, VS, diagnostic testing reviewed  Time spent with pt 45 min  DVT prophylaxis: SCDs  Surrogate decision maker: Abdirahman Organ (wife) [phone]  Estimated length of stay: >2MN  Case discussed with pt, care team, wife at bedside, Dr. Cher Robertson, NP

## 2017-08-22 LAB
25(OH)D3+25(OH)D2 SERPL-MCNC: 23.1 NG/ML (ref 30–100)
ANION GAP SERPL CALC-SCNC: 10 MMOL/L (ref 7–16)
BASOPHILS # BLD: 0 K/UL (ref 0–0.2)
BASOPHILS NFR BLD: 0 % (ref 0–2)
BUN SERPL-MCNC: 15 MG/DL (ref 8–23)
CALCIUM SERPL-MCNC: 8.1 MG/DL (ref 8.3–10.4)
CHLORIDE SERPL-SCNC: 109 MMOL/L (ref 98–107)
CO2 SERPL-SCNC: 23 MMOL/L (ref 21–32)
CREAT SERPL-MCNC: 0.98 MG/DL (ref 0.8–1.5)
DIFFERENTIAL METHOD BLD: ABNORMAL
EOSINOPHIL # BLD: 0.2 K/UL (ref 0–0.8)
EOSINOPHIL NFR BLD: 2 % (ref 0.5–7.8)
ERYTHROCYTE [DISTWIDTH] IN BLOOD BY AUTOMATED COUNT: 13.5 % (ref 11.9–14.6)
GLUCOSE SERPL-MCNC: 108 MG/DL (ref 65–100)
HCT VFR BLD AUTO: 34.7 % (ref 41.1–50.3)
HGB BLD-MCNC: 12 G/DL (ref 13.6–17.2)
IMM GRANULOCYTES # BLD: 0 K/UL (ref 0–0.5)
IMM GRANULOCYTES NFR BLD: 0.4 % (ref 0–5)
LYMPHOCYTES # BLD: 0.9 K/UL (ref 0.5–4.6)
LYMPHOCYTES NFR BLD: 12 % (ref 13–44)
MCH RBC QN AUTO: 34.7 PG (ref 26.1–32.9)
MCHC RBC AUTO-ENTMCNC: 34.6 G/DL (ref 31.4–35)
MCV RBC AUTO: 100.3 FL (ref 79.6–97.8)
MM INDURATION POC: 0 MM (ref 0–5)
MONOCYTES # BLD: 0.9 K/UL (ref 0.1–1.3)
MONOCYTES NFR BLD: 12 % (ref 4–12)
NEUTS SEG # BLD: 5.1 K/UL (ref 1.7–8.2)
NEUTS SEG NFR BLD: 74 % (ref 43–78)
PLATELET # BLD AUTO: 218 K/UL (ref 150–450)
PMV BLD AUTO: 10 FL (ref 10.8–14.1)
POTASSIUM SERPL-SCNC: 4.2 MMOL/L (ref 3.5–5.1)
PPD POC: NEGATIVE NEGATIVE
RBC # BLD AUTO: 3.46 M/UL (ref 4.23–5.67)
SODIUM SERPL-SCNC: 142 MMOL/L (ref 136–145)
WBC # BLD AUTO: 7 K/UL (ref 4.3–11.1)

## 2017-08-22 PROCEDURE — 65270000029 HC RM PRIVATE

## 2017-08-22 PROCEDURE — 80048 BASIC METABOLIC PNL TOTAL CA: CPT | Performed by: NURSE PRACTITIONER

## 2017-08-22 PROCEDURE — 74011250637 HC RX REV CODE- 250/637: Performed by: HOSPITALIST

## 2017-08-22 PROCEDURE — 97530 THERAPEUTIC ACTIVITIES: CPT

## 2017-08-22 PROCEDURE — 85025 COMPLETE CBC W/AUTO DIFF WBC: CPT | Performed by: NURSE PRACTITIONER

## 2017-08-22 PROCEDURE — 77030027138 HC INCENT SPIROMETER -A

## 2017-08-22 PROCEDURE — 36415 COLL VENOUS BLD VENIPUNCTURE: CPT | Performed by: NURSE PRACTITIONER

## 2017-08-22 PROCEDURE — 74011250637 HC RX REV CODE- 250/637: Performed by: EMERGENCY MEDICINE

## 2017-08-22 PROCEDURE — 74011250637 HC RX REV CODE- 250/637: Performed by: ORTHOPAEDIC SURGERY

## 2017-08-22 PROCEDURE — 97161 PT EVAL LOW COMPLEX 20 MIN: CPT

## 2017-08-22 PROCEDURE — 74011000258 HC RX REV CODE- 258: Performed by: ORTHOPAEDIC SURGERY

## 2017-08-22 PROCEDURE — BT40ZZZ ULTRASONOGRAPHY OF BLADDER: ICD-10-PCS | Performed by: HOSPITALIST

## 2017-08-22 PROCEDURE — 74011250636 HC RX REV CODE- 250/636: Performed by: ORTHOPAEDIC SURGERY

## 2017-08-22 PROCEDURE — 97165 OT EVAL LOW COMPLEX 30 MIN: CPT

## 2017-08-22 PROCEDURE — 51798 US URINE CAPACITY MEASURE: CPT

## 2017-08-22 PROCEDURE — 74011250637 HC RX REV CODE- 250/637: Performed by: NURSE PRACTITIONER

## 2017-08-22 RX ORDER — GUAIFENESIN 100 MG/5ML
81 LIQUID (ML) ORAL DAILY
Status: DISCONTINUED | OUTPATIENT
Start: 2017-08-22 | End: 2017-08-24 | Stop reason: HOSPADM

## 2017-08-22 RX ADMIN — OXYCODONE HYDROCHLORIDE 5 MG: 5 TABLET ORAL at 05:17

## 2017-08-22 RX ADMIN — PANTOPRAZOLE SODIUM 40 MG: 40 TABLET, DELAYED RELEASE ORAL at 05:12

## 2017-08-22 RX ADMIN — ACETAMINOPHEN 650 MG: 325 TABLET ORAL at 23:46

## 2017-08-22 RX ADMIN — CALCIUM CARBONATE 500 MG (1,250 MG)-VITAMIN D3 200 UNIT TABLET 1 TABLET: at 08:07

## 2017-08-22 RX ADMIN — ONDANSETRON 4 MG: 2 INJECTION INTRAMUSCULAR; INTRAVENOUS at 17:16

## 2017-08-22 RX ADMIN — TRAMADOL HYDROCHLORIDE 50 MG: 50 TABLET, FILM COATED ORAL at 17:16

## 2017-08-22 RX ADMIN — ENOXAPARIN SODIUM 30 MG: 30 INJECTION SUBCUTANEOUS at 18:31

## 2017-08-22 RX ADMIN — CALCIUM CARBONATE 500 MG (1,250 MG)-VITAMIN D3 200 UNIT TABLET 1 TABLET: at 17:16

## 2017-08-22 RX ADMIN — ACETAMINOPHEN 650 MG: 325 TABLET ORAL at 05:11

## 2017-08-22 RX ADMIN — METOPROLOL SUCCINATE 12.5 MG: 25 TABLET, EXTENDED RELEASE ORAL at 08:07

## 2017-08-22 RX ADMIN — CEFAZOLIN SODIUM 1 G: 1 INJECTION, POWDER, FOR SOLUTION INTRAMUSCULAR; INTRAVENOUS at 01:27

## 2017-08-22 RX ADMIN — ATORVASTATIN CALCIUM 80 MG: 40 TABLET, FILM COATED ORAL at 08:07

## 2017-08-22 RX ADMIN — RANOLAZINE 1000 MG: 500 TABLET, FILM COATED, EXTENDED RELEASE ORAL at 23:46

## 2017-08-22 RX ADMIN — ONDANSETRON 4 MG: 2 INJECTION INTRAMUSCULAR; INTRAVENOUS at 05:17

## 2017-08-22 RX ADMIN — ONDANSETRON 4 MG: 2 INJECTION INTRAMUSCULAR; INTRAVENOUS at 10:39

## 2017-08-22 RX ADMIN — ACETAMINOPHEN 650 MG: 325 TABLET ORAL at 14:02

## 2017-08-22 RX ADMIN — CEFAZOLIN SODIUM 1 G: 1 INJECTION, POWDER, FOR SOLUTION INTRAMUSCULAR; INTRAVENOUS at 08:08

## 2017-08-22 RX ADMIN — OXYCODONE HYDROCHLORIDE 5 MG: 5 TABLET ORAL at 10:39

## 2017-08-22 RX ADMIN — ASPIRIN 81 MG 81 MG: 81 TABLET ORAL at 10:29

## 2017-08-22 RX ADMIN — CALCIUM CARBONATE 500 MG (1,250 MG)-VITAMIN D3 200 UNIT TABLET 1 TABLET: at 14:02

## 2017-08-22 RX ADMIN — RANOLAZINE 1000 MG: 500 TABLET, FILM COATED, EXTENDED RELEASE ORAL at 08:07

## 2017-08-22 NOTE — PROGRESS NOTES
Problem: Falls - Risk of  Goal: *Absence of Falls  Document Greg Fall Risk and appropriate interventions in the flowsheet.    Outcome: Progressing Towards Goal  Fall Risk Interventions:  Mobility Interventions: Bed/chair exit alarm, OT consult for ADLs, Patient to call before getting OOB, PT Consult for assist device competence, PT Consult for mobility concerns, Utilize walker, cane, or other assitive device           Medication Interventions: Bed/chair exit alarm, Evaluate medications/consider consulting pharmacy, Patient to call before getting OOB, Teach patient to arise slowly     Elimination Interventions: Call light in reach, Bed/chair exit alarm, Toileting schedule/hourly rounds, Patient to call for help with toileting needs     History of Falls Interventions: Bed/chair exit alarm, Consult care management for discharge planning, Door open when patient unattended, Investigate reason for fall, Evaluate medications/consider consulting pharmacy

## 2017-08-22 NOTE — PROGRESS NOTES
Problem: Self Care Deficits Care Plan (Adult)  Goal: *Acute Goals and Plan of Care (Insert Text)  1. Patient will maintain WBAT status in RLE for 100% of treatment session and no verbal cues from therapist.   2. Patient will complete functional transfers with supervision while maintaining WBAT status in RLE and with adaptive equipment as needed. 3. Patient will complete lower body bathing and dressing with SBA and adaptive equipment as needed. 4. Patient will complete toileting and toilet transfer with SBA. 5. Patient will tolerate 23 minutes of OT treatment with less than 3 rest breaks to increase activity tolerance for ADLs. 6. Patient will participate in at least 23 minutes of BUE therapeutic exercises to strengthen BUE for functional transfers. Timeframe: 7 visits     Comments:       OCCUPATIONAL THERAPY: Initial Assessment and AM 8/22/2017  INPATIENT: Hospital Day: 2  [place] / [place] / Product Type: Medicare /      NAME/AGE/GENDER: Jared Alvarez is a 69 y.o. male    PRIMARY DIAGNOSIS:  Right Hip Fracture   Hip fx ([facility] [address] Hip fx ([facility] [address] Hip fx ([facility] [address]  Procedure(s) (LRB):  Right HIP HEMIARTHROPLASTY (Right)  1 Day Post-Op  ICD-10: Treatment Diagnosis:        · Pain in Right Hip (M25.551)  · Stiffness of Right Hip, Not elsewhere classified (M25.651)  · Generalized Muscle Weakness (M62.81)  · Other lack of cordination (R27.8)  · History of falling (Z91.81)   Precautions/Allergies:        R WBAT Phenergan [promethazine]       ASSESSMENT:      Mr. Misha Lopez presents to hospital for above with decreased balance, strength, activity tolerance, ADL performance, and functional mobility. Pt lives in [address] with wife and children, there are 4 DARREN and 7 interior stairs. Pt is typically independent with ADLs/IADLs, includes driving. Today, pt is seated in chair upon arrival with wife present. He is AOx4 and agreeable to OT evaluation.  Pt presents with functional BUE strength and AROM. He completes STS from chair with min A x1 and RW; pt is able to take forward and backward steps with min A x1 and RW. Pt became nausea after mobility, RN is aware. Mr. Verona Calabrese is functioning below his baseline with ADLs and he requires continued skilled OT services aimed at maximizing safety and independence with ADLs upon d/c. Will follow during acute stay. This section established at most recent assessment   PROBLEM LIST (Impairments causing functional limitations):  1. Decreased Strength  2. Decreased ADL/Functional Activities  3. Decreased Transfer Abilities  4. Decreased Ambulation Ability/Technique  5. Decreased Balance  6. Increased Pain  7. Decreased Activity Tolerance  8. Decreased Work Simplification/Energy Conservation Techniques  9. Edema/Girth  10. Decreased Knowledge of Precautions    INTERVENTIONS PLANNED: (Benefits and precautions of occupational therapy have been discussed with the patient.)  1. Activities of daily living training  2. Adaptive equipment training  3. Balance training  4. Clothing management  5. Donning&doffing training  6. Group therapy  7. Therapeutic activity  8. Therapeutic exercise      TREATMENT PLAN: Frequency/Duration: Follow patient 6x/week to address above goals. Rehabilitation Potential For Stated Goals: GOOD      RECOMMENDED REHABILITATION/EQUIPMENT: (at time of discharge pending progress): Continue Skilled Therapy. OCCUPATIONAL PROFILE AND HISTORY:   History of Present Injury/Illness (Reason for Referral):  See H&P  Past Medical History/Comorbidities:   Mr. 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 Mr. Maye Contreras  has a past surgical history that includes orthopaedic; cholecystectomy; coronary artery bypass graft; and knee replacement (Left). Social History/Living Environment:   Home Environment: Other (comment) (tri-level )  # Steps to Enter: 4  Rails to Enter: Yes  Hand Rails : Right  One/Two Story Residence: Other (Comment) (tri-level)  # of Interior Steps: 7  Interior Rails: Left  Living Alone: No  Support Systems: Spouse/Significant Other/Partner, Child(benjy)  Patient Expects to be Discharged to[de-identified] Rehabilitation facility  Current DME Used/Available at Home: Shower chair, Walker, rolling  Tub or Shower Type: Shower  Prior Level of Function/Work/Activity:  Bend with ADLs  Personal Factors:          Sex:  male        Age:  69 y.o. Number of Personal Factors/Comorbidities that affect the Plan of Care: Expanded review of therapy/medical records (1-2):  MODERATE COMPLEXITY   ASSESSMENT OF OCCUPATIONAL PERFORMANCE[de-identified]   Activities of Daily Living:           Basic ADLs (From Assessment) Complex ADLs (From Assessment)   Basic ADL  Feeding: Independent  Oral Facial Hygiene/Grooming: Independent  Bathing: Minimum assistance  Upper Body Dressing: Independent  Lower Body Dressing: Moderate assistance  Toileting: Moderate assistance Instrumental ADL  Meal Preparation: Moderate assistance  Homemaking: Maximum assistance  Medication Management: Independent  Financial Management: Independent   Grooming/Bathing/Dressing Activities of Daily Living     Cognitive Retraining  Safety/Judgement: Awareness of environment; Fall prevention                       Bed/Mat Mobility  Supine to Sit: Moderate assistance  Sit to Stand: Minimum assistance; Moderate assistance  Bed to Chair: Moderate assistance  Scooting: Contact guard assistance          Most Recent Physical Functioning:   Gross Assessment:  AROM: Within functional limits  Strength: Within functional limits               Posture:  Posture (WDL): Exceptions to WDL  Posture Assessment: Forward head, Rounded shoulders  Balance:  Sitting: Impaired  Sitting - Static: Good (unsupported)  Sitting - Dynamic: Fair (occasional)  Standing: Impaired  Standing - Static: Fair  Standing - Dynamic : Fair Bed Mobility:  Supine to Sit: Moderate assistance  Scooting: Contact guard assistance  Wheelchair Mobility:     Transfers:  Sit to Stand: Minimum assistance; Moderate assistance  Stand to Sit: Moderate assistance;Minimum assistance  Bed to Chair: Moderate assistance                    Patient Vitals for the past 6 hrs:       BP BP Patient Position SpO2 Pulse   17 1145 100/61 Sitting 98 % 79        Mental Status  Neurologic State: Alert  Orientation Level: Oriented X4  Cognition: Follows commands, Appropriate safety awareness, Appropriate for age attention/concentration, Appropriate decision making  Perception: Appears intact  Perseveration: No perseveration noted  Safety/Judgement: Awareness of environment, Fall prevention                               Physical Skills Involved:  1. Balance  2. Strength  3. Activity Tolerance  4. Gross Motor Control  5. Pain (acute)  6. Edema Cognitive Skills Affected (resulting in the inability to perform in a timely and safe manner): 1. none Psychosocial Skills Affected:  1. Habits/Routines  2. Environmental Adaptation   Number of elements that affect the Plan of Care: 5+:  HIGH COMPLEXITY   CLINICAL DECISION MAKIN \[facility] [place] [facility]\"" [address] [address] AM-PAC 6 Clicks   Daily Activity Inpatient Short Form  How much help from another person does the patient currently need. .. Total A Lot A Little None   1. Putting on and taking off regular lower body clothing?   [ ] 1   [X] 2   [ ] 3   [ ] 4   2. Bathing (including washing, rinsing, drying)? [ ] 1   [ ] 2   [X] 3   [ ] 4   3.   Toileting, which includes using toilet, bedpan or urinal?   [ ] 1   [ ] 2   [X] 3   [ ] 4   4. Putting on and taking off regular upper body clothing?   [ ] 1   [ ] 2   [ ] 3   [X] 4   5. Taking care of personal grooming such as brushing teeth? [ ] 1   [ ] 2   [ ] 3   [X] 4   6. Eating meals? [ ] 1   [ ] 2   [ ] 3   [X] 4   © 2007, Trustees of [address] [place] [address] [address] [address], under license to [employer]. All rights reserved    Score:  Initial: 20 Most Recent: X (Date: -- )     Interpretation of Tool:  Represents activities that are increasingly more difficult (i.e. Bed mobility, Transfers, Gait). Score 24 23 22-20 19-15 14-10 9-7 6       Modifier CH CI CJ CK CL CM CN         · Self Care:               - CURRENT STATUS:    CJ - 20%-39% impaired, limited or restricted               - GOAL STATUS:           CI - 1%-19% impaired, limited or restricted               - D/C STATUS:                       ---------------To be determined---------------  [place] / [place] / Product Type: Medicare /       Medical Necessity:     · Patient is expected to demonstrate progress in strength, balance, coordination and functional technique to increase independence with ADLs. Reason for Services/Other Comments:  · Patient continues to require modification of therapeutic interventions to increase complexity of exercises.    Use of outcome tool(s) and clinical judgement create a POC that gives a: LOW COMPLEXITY             TREATMENT:   (In addition to Assessment/Re-Assessment sessions the following treatments were rendered)      Pre-treatment Symptoms/Complaints:    Pain: Initial:   Pain Intensity 1: 0  Post Session:  same      Assessment/Reassessment only, no treatment provided today     Braces/Orthotics/Lines/Etc:   · IV  · O2 Device: Room air  Treatment/Session Assessment:    · Response to Treatment:  eval only  · Interdisciplinary Collaboration:  · Physical Therapist  · Occupational Therapist  · Registered Nurse  · After treatment position/precautions:  · Up in chair  · Bed alarm/tab alert on  · Bed/Chair-wheels locked  · Call light within reach  · RN notified  · Family at bedside  · Nurse at bedside  · Compliance with Program/Exercises: compliant all of the time. · Recommendations/Intent for next treatment session: \"Next visit will focus on advancements to more challenging activities and reduction in assistance provided\".   Total Treatment Duration:  OT Patient Time In/Time Out  Time In: 9158  Time Out: Carla Huffman. 93.

## 2017-08-22 NOTE — PROGRESS NOTES
TRANSFER - IN REPORT:    Verbal report received from Trent morris RN on Liliane Ruiz  being received from PACU for continuation of care. Report consisted of patients Situation, Background, Assessment and   Recommendations(SBAR). Information was reviewed with the receiving nurse. Opportunity for questions and clarification was provided. Assessment completed upon patients arrival to unit and care assumed.

## 2017-08-22 NOTE — PERIOP NOTES
TRANSFER - OUT REPORT:    Verbal report given to [facility]) on [employer]  being transferred to room 723(unit) for routine post - op       Report consisted of patients Situation, Background, Assessment and   Recommendations(SBAR). Information from the following report(s) SBAR, Kardex, OR Summary, Intake/Output and MAR was reviewed with the receiving nurse. Lines:   Peripheral IV 08/21/17 Left Wrist (Active)   Site Assessment Clean, dry, & intact 8/21/2017  4:10 PM   Phlebitis Assessment 0 8/21/2017  4:10 PM   Infiltration Assessment 0 8/21/2017  4:10 PM   Dressing Status Clean, dry, & intact 8/21/2017  4:10 PM   Dressing Type Tape;Transparent 8/21/2017  4:10 PM   Hub Color/Line Status Pink;Capped 8/21/2017  4:10 PM        Opportunity for questions and clarification was provided. Patient transported with:      VTE prophylaxis orders have been written for [employer]. Patient and family given floor number and nurses name. Family updated re: pt status after security code verified.

## 2017-08-22 NOTE — PROGRESS NOTES
Problem: Falls - Risk of  Goal: *Absence of Falls  Document Greg Fall Risk and appropriate interventions in the flowsheet.    Outcome: Progressing Towards Goal  Fall Risk Interventions:              Medication Interventions: Patient to call before getting OOB, Teach patient to arise slowly     Elimination Interventions: Call light in reach, Patient to call for help with toileting needs, Toileting schedule/hourly rounds     History of Falls Interventions: Door open when patient unattended, Room close to nurse's station

## 2017-08-22 NOTE — PROGRESS NOTES
Problem: Mobility Impaired (Adult and Pediatric)  Goal: *Acute Goals and Plan of Care (Insert Text)  STG:  (1.)Mr. Blum will move from supine to sit and sit to supine , scoot up and down and roll side to side with CONTACT GUARD ASSIST within 3 day(s). (2.)Mr. Blum will transfer from bed to chair and chair to bed with STAND BY ASSIST using the least restrictive device within 3 day(s). (3.)Mr. Blum will ambulate with STAND BY ASSIST for 50 feet with the least restrictive device within 3 day(s). (4.)Mr. Blum will maintain hip precautions RLE throughout all functional mobility within 3 days with 0 verbal cues. (5.)Mr. Blum will perform LE exercises with 1 to 2 cues for form within 3 days to improve strength for functional transfers and ambulation. LTG:  (1.)Mr. Blum will move from supine to sit and sit to supine , scoot up and down and roll side to side in bed with STAND BY ASSIST within 7 day(s). (2.)Mr. Blum will transfer from bed to chair and chair to bed with SUPERVISION using the least restrictive device within 7 day(s). (3.)Mr. Blum will ambulate with SUPERVISION for 125 feet with the least restrictive device within 7 day(s).   ________________________________________________________________________________________________      PHYSICAL THERAPY: Daily Note, Treatment Day: Day of Assessment and PM 8/22/2017  INPATIENT: Hospital Day: 2  [place] / [place] / Product Type: Medicare /    WBAT RLE, hip precautions RLE, knee immobilizer when in bed or chair  NAME/AGE/GENDER: Aria Davis is a 69 y.o. male    PRIMARY DIAGNOSIS: Right Hip Fracture   Hip fx ([address] Hip fx ([address] Hip fx ([address]  Procedure(s) (LRB):  Right HIP HEMIARTHROPLASTY (Right)  1 Day Post-Op  ICD-10: Treatment Diagnosis:       · Difficulty in walking, Not elsewhere classified (R26.2)   Precaution/Allergies:  Phenergan [promethazine]       ASSESSMENT:      Mr. Moni Robledo is a 69 y.o. male s/p R hip fracture. He is typically very independent, drives and ambulates independently. He does have a history of spinal pain and is supposed to have back surgery in the near future per wife. He has B hearing aids to wear and was able to place independently. He is home during the day alone as one of his daughters is mentally disabled, wife and other daughter work. He is supine on contact and agreeable to therapy evaluation. Educated on hip precautions and removed knee immobilizer for mobility this AM. He transferred to sitting with moderate assistance, stood with minimal assist and ambulated to recliner with minimal assist. Remained in chair at the end of the session. Wife appears anxious, reporting that she is very concerned about patient's ability to climb to 2nd floor of home (up 7 steps) although she reports they do have an accessible bedroom on the first floor. Faizan Giordano is currently functioning below his baseline and would benefit from skilled PT during acute care stay to maximize safety and independence with functional mobility. PM Note: Patient sitting in chair and agreeable to therapy. He stood with CGA, ambulated with rolling walker around room 15' with CGA and no losses of balance. Verbal cues required for walker negotiation within room. He sat at edge of bed and returned to supine with moderate assistance. Patient reports he is very fearful of breaking his hip precautions. Discussed this with him further and wife reports that he was very fearful of breaking his sternal precautions when he had open heart surgery. Good progress with therapy this PM with increased ambulation distance and decreased assist required. Will continue therapy efforts. This section established at most recent assessment   PROBLEM LIST (Impairments causing functional limitations):  1. Decreased Strength  2. Decreased ADL/Functional Activities  3. Decreased Transfer Abilities  4.  Decreased Ambulation Ability/Technique  5. Decreased Balance  6. Increased Pain  7. Decreased Knowledge of Precautions  8. Decreased [place] with Home Exercise Program    INTERVENTIONS PLANNED: (Benefits and precautions of physical therapy have been discussed with the patient.)  1. Balance Exercise  2. Bed Mobility  3. Family Education  4. Gait Training  5. Home Exercise Program (HEP)  6. Therapeutic Activites  7. Therapeutic Exercise/Strengthening  8. Transfer Training  9. Patient Education  10. Group Therapy      TREATMENT PLAN: Frequency/Duration: twice daily for duration of hospital stay  Rehabilitation Potential For Stated Goals: GOOD      RECOMMENDED REHABILITATION/EQUIPMENT: (at time of discharge pending progress): Continue Skilled Therapy. HISTORY:   History of Present Injury/Illness (Reason for Referral):  Per MD Note: \"Mr. Mane Fabian is a 70 yo male who presented after falling at home landing on his right hip. Pt reports was working on his car and opened his radiator cap and fell backwards trying to avoid getting burned. Pt found to have a right femoral neck fx. Pt with hx of HTN, CABG X4, bilateral knee replacements, cholecystectomy. Last echo Feb 2016 with EF 50-55%. Reports hx of difficulty with anesthesia, severe vomiting, difficulty weaning from vent. Pt denies LOC, hitting head, syncope, dizziness, n/v/d, CP, SOB. \"  Past Medical History/Comorbidities:   Mr. Mane Fabian  has a past medical history of Arteriosclerosis of coronary artery bypass graft (11/2/2015); CAD (coronary artery disease); Coronary atherosclerosis of native coronary artery (11/2/2015); Dyspnea/Shortness of breath (11/2/2015); Elevated prostate specific antigen (PSA); Esophageal reflux; Extremity atherosclerosis with intermittent claudication ([facility] (11/2/2015); Heart disease, unspecified; Hyperlipidemia (11/2/2015);  Hypertrophy of prostate with urinary obstruction and other lower urinary tract symptoms (LUTS); and Pure hypercholesterolemia. Mr. Luana Negron  has a past surgical history that includes orthopaedic; cholecystectomy; coronary artery bypass graft; and knee replacement (Left). Social History/Living Environment:   Home Environment: Other (comment) (tri-level )  # Steps to Enter: 4  Rails to Enter: Yes  Hand Rails : Right  One/Two Story Residence: Other (Comment) (tri-level)  # of Interior Steps: 7  Interior Rails: Left  Living Alone: No  Support Systems: Spouse/Significant Other/Partner, Child(benjy)  Patient Expects to be Discharged to[de-identified] Rehabilitation facility  Current DME Used/Available at Home: Shower chair, Walker, rolling  Tub or Shower Type: Shower  Prior Level of Function/Work/Activity:  independent      Number of Personal Factors/Comorbidities that affect the Plan of Care: 1-2: MODERATE COMPLEXITY   EXAMINATION:   Most Recent Physical Functioning:   Gross Assessment:  AROM: Generally decreased, functional  PROM: Generally decreased, functional  Strength: Generally decreased, functional  Coordination: Generally decreased, functional  Tone: Normal  Sensation: Intact               Posture:  Posture (WDL): Exceptions to WDL  Posture Assessment: Forward head, Rounded shoulders  Balance:  Sitting: Intact  Sitting - Static: Good (unsupported)  Sitting - Dynamic: Fair (occasional)  Standing: Impaired  Standing - Static: Fair  Standing - Dynamic : Fair Bed Mobility:  Supine to Sit: Moderate assistance  Sit to Supine: Moderate assistance  Scooting: Contact guard assistance  Wheelchair Mobility:     Transfers:  Sit to Stand: Contact guard assistance  Stand to Sit: Contact guard assistance  Bed to Chair: Moderate assistance  Interventions: Safety awareness training;Visual cues; Verbal cues  Gait:  Right Side Weight Bearing: As tolerated  Left Side Weight Bearing: Full  Base of Support: Center of gravity altered;Narrowed  Speed/Mei: Slow;Shuffled;Pace decreased (<100 feet/min)  Step Length: Right shortened  Gait Abnormalities: Decreased step clearance; Antalgic; Path deviations;Trunk sway increased  Distance (ft): 15 Feet (ft)  Assistive Device: Walker, rolling  Ambulation - Level of Assistance: Contact guard assistance  Interventions: Safety awareness training;Verbal cues; Visual/Demos       Body Structures Involved:  1. Nerves  2. Bones  3. Joints  4. Muscles  5. Ligaments Body Functions Affected:  1. Sensory/Pain  2. Neuromusculoskeletal  3. Movement Related Activities and Participation Affected:  1. Mobility  2. Self Care  3. Domestic Life  4. Interpersonal Interactions and Relationships  5. Community, Social and [place]   Number of elements that affect the Plan of Care: 4+: HIGH COMPLEXITY   CLINICAL PRESENTATION:   Presentation: Stable and uncomplicated: LOW COMPLEXITY   CLINICAL DECISION MAKIN [facility] Inpatient Short Form  How much difficulty does the patient currently have. .. Unable A Lot A Little None   1. Turning over in bed (including adjusting bedclothes, sheets and blankets)? [ ] 1   [ ] 2   [X] 3   [ ] 4   2. Sitting down on and standing up from a chair with arms ( e.g., wheelchair, bedside commode, etc.)   [ ] 1   [ ] 2   [X] 3   [ ] 4   3. Moving from lying on back to sitting on the side of the bed? [ ] 1   [X] 2   [ ] 3   [ ] 4   How much help from another person does the patient currently need. .. Total A Lot A Little None   4. Moving to and from a bed to a chair (including a wheelchair)? [ ] 1   [X] 2   [ ] 3   [ ] 4   5. Need to walk in hospital room? [ ] 1   [X] 2   [ ] 3   [ ] 4   6. Climbing 3-5 steps with a railing? [ ] 1   [X] 2   [ ] 3   [ ] 4   © , Trustees of [address] [place] [address] [address] [id number], under license to [employer]. All rights reserved    Score:  Initial: 14 Most Recent: X (Date: -- )     Interpretation of Tool:  Represents activities that are increasingly more difficult (i.e. Bed mobility, Transfers, Gait).        Score 24 23 22-20 19-15 14-10 9-7 6 Modifier CH CI CJ CK CL CM CN         · Mobility - Walking and Moving Around:               - CURRENT STATUS:    CL - 60%-79% impaired, limited or restricted               - GOAL STATUS:           CK - 40%-59% impaired, limited or restricted               - D/C STATUS:                       ---------------To be determined---------------  [place] / [place] / Product Type: Medicare /       Medical Necessity:     · Patient demonstrates good rehab potential due to higher previous functional level. Reason for Services/Other Comments:  · Patient continues to require modification of therapeutic interventions to increase complexity of exercises. Use of outcome tool(s) and clinical judgement create a POC that gives a: Clear prediction of patient's progress: LOW COMPLEXITY                 TREATMENT:   (In addition to Assessment/Re-Assessment sessions the following treatments were rendered)   Pre-treatment Symptoms/Complaints:  none  Pain: Initial:   Pain Intensity 1: 5  Post Session:  6/10      Therapeutic Activity: (    24 minutes): Therapeutic activities including Bed transfers, Chair transfers and Ambulation on level ground to improve mobility, strength, balance and coordination. Required minimal Safety awareness training;Verbal cues; Visual/Demos to promote static and dynamic balance in standing. Braces/Orthotics/Lines/Etc:   · IV  · O2 Device: Room air  Treatment/Session Assessment:    · Response to Treatment:  Patient tolerated treatment well. · Interdisciplinary Collaboration:  · Physical Therapist  · Registered Nurse  · After treatment position/precautions:  · Supine in bed, Bed alarm/tab alert on, Bed/Chair-wheels locked, Bed in low position, Call light within reach, RN notified and Family at bedside  · Compliance with Program/Exercises: Will assess as treatment progresses. · Recommendations/Intent for next treatment session:   \"Next visit will focus on advancements to more challenging activities and reduction in assistance provided\".   Total Treatment Duration:PT Patient Time In/Time Out  Time In: 1435  Time Out: 143 NOAH WilliamsonT

## 2017-08-22 NOTE — PROGRESS NOTES
Primary Nurse Aileen Reyna RN and Mancel Cassia, RN performed a dual skin assessment on this patient No impairment noted  Mariano score is 18

## 2017-08-22 NOTE — PROGRESS NOTES
Hospitalist Progress Note    2017  Admit Date: 2017 10:46 AM   NAME: Janee Leija   :  1940   DOS:              17  MRN:  [id number]   Attending: Moisés Hogan MD  PCP:  Indra Veras MD  Treatment Team: Attending Provider: Pete Mota MD; Consulting Provider: Ramon Gaffney MD; Consulting Provider: Ashli Penaloza MD; Care Manager: Manuel Salamanca LMSW    Full Code     SUBJECTIVE:   As previously documented: \"  Mr. Luana Negron is a 68 yo M with PMHx of CAD s/p CABG x4, HTN, GERD, HLP, BPH, OA s/p b/l TKR who was admitted on 2017 after falling at home landing on his right hip. Mr. Luana Negron was working on his car and opened his radiator cap and fell backwards trying to avoid getting burned. ED work-up showed right femoral neck fx. Orthopedics were consulted and he is s/p R hip hemiarthroplasty done 17. \"       17  Mr Janee Leija denies any SOB, CP or abdominal pain. Pain s/p surgery well controlled. Afebrile. 10+ ROS reviewed and negative except for positive in HPI.    Allergies   Allergen Reactions    Phenergan [Promethazine] Unknown (comments)     Current Facility-Administered Medications   Medication Dose Route Frequency    PPD Read Reminder  1 Each Other Q24H    aspirin chewable tablet 81 mg  81 mg Oral DAILY    acetaminophen (TYLENOL) tablet 650 mg  650 mg Oral Q8H    HYDROmorphone (PF) (DILAUDID) injection 0.5 mg  0.5 mg IntraVENous Q4H PRN    traMADol (ULTRAM) tablet 50 mg  50 mg Oral Q6H PRN    sodium chloride (NS) flush 5-10 mL  5-10 mL IntraVENous Q8H    sodium chloride (NS) flush 5-10 mL  5-10 mL IntraVENous PRN    alum-mag hydroxide-simeth (MYLANTA) oral suspension 30 mL  30 mL Oral Q4H PRN    calcium-vitamin D (OS-CHRISTINA) 500 mg-200 unit tablet  1 Tab Oral TID WITH MEALS    oxyCODONE IR (ROXICODONE) tablet 5 mg  5 mg Oral Q4H PRN    enoxaparin (LOVENOX) injection 30 mg  30 mg SubCUTAneous Q24H    ranolazine ER (RANEXA) tablet 1,000 mg  1,000 mg Oral Q12H    metoprolol succinate (TOPROL-XL) XL tablet 12.5 mg  12.5 mg Oral DAILY    pantoprazole (PROTONIX) tablet 40 mg  40 mg Oral ACB    atorvastatin (LIPITOR) tablet 80 mg  80 mg Oral DAILY    ondansetron (ZOFRAN) injection 4 mg  4 mg IntraVENous Q4H PRN         Immunization History   Administered Date(s) Administered    TB Skin Test (PPD) Intradermal 2017     Objective:     Patient Vitals for the past 24 hrs:   Temp Pulse Resp BP SpO2   17 1401 97.8 °F (36.6 °C) 85 18 127/53 93 %   17 1145 98 °F (36.7 °C) 79 17 100/61 98 %   17 0818 97.6 °F (36.4 °C) 88 16 97/59 90 %   17 0306 97.6 °F (36.4 °C) 82 16 104/61 90 %   17 0027 97.8 °F (36.6 °C) 86 16 105/61 90 %   17 - 80 - 118/68 91 %   17 -  - 134/71 -   17 - 138/72 91 %   17 - 116/65 93 %   17 97.1 °F (36.2 °C) 78 18 112/64 91 %   17 -  23 - 93 %   17 97.6 °F (36.4 °C) 77 - 107/58 90 %   17 12 - 90 %   17 - 107/56 91 %   17 15 - 90 %   17 - 112/55 93 %   17 13 117/59 90 %   17 12 122/69 94 %   17 12 - 95 %   17 - 117/65 94 %   17 - 116/63 92 %   17 11 - 91 %   17 - 75 10 117/57 92 %   17 78 - 99/60 93 %   17 -  16 - 96 %   17 14 - 98 %   17 11 - 97 %   17 -  - 123/74 98 %   17 11 - 98 %   17 - 121/73 97 %   17 -  11 - 97 %   17 80 - 119/65 97 %   17 17 - 96 %   17 97.8 °F (36.6 °C) 82 12 113/63 96 %   17 - - - - 96 %     Temp (24hrs), Av.7 °F (36.5 °C), Min:97.1 °F (36.2 °C), Max:98 °F (36.7 °C)    Oxygen Therapy  O2 Sat (%): 93 % (17 1401)  Pulse via Oximetry: 81 beats per minute (08/21/17 2006)  O2 Device: Room air (08/21/17 1945)  O2 Flow Rate (L/min): 3 l/min (08/21/17 1846)  Oxygen Therapy  O2 Sat (%): 93 % (08/22/17 1401)  Pulse via Oximetry: 81 beats per minute (08/21/17 2006)  O2 Device: Room air (08/21/17 1945)  O2 Flow Rate (L/min): 3 l/min (08/21/17 1846)    Physical Exam:  General:         Alert, cooperative, no distress . Afebrile. HEENT:               NCAT. No obvious deformity. Nares normal. No drainage  Lungs:  CTABL. No wheezing/rhonchi/rales  Cardiovascular:   RRR. No m/r/g. No pedal edema b/l. +2 PT/DT pulses b/l. Abdomen:       S/nt/nd. Bowel sounds normal. .   Skin:           Not Jaundiced  Musculoskeletal: R hip with dressing  Neurologic:    AAOx3. CN II- XII grossly WNL. No gross focal deficit. Moves all extremities. Psychiatric:         Good mood. Normal affect.               DIAGNOSTIC STUDIES      Data Review:   Recent Results (from the past 24 hour(s))   METABOLIC PANEL, BASIC    Collection Time: 08/22/17  5:15 AM   Result Value Ref Range    Sodium 142 136 - 145 mmol/L    Potassium 4.2 3.5 - 5.1 mmol/L    Chloride 109 (H) 98 - 107 mmol/L    CO2 23 21 - 32 mmol/L    Anion gap 10 7 - 16 mmol/L    Glucose 108 (H) 65 - 100 mg/dL    BUN 15 8 - 23 MG/DL    Creatinine 0.98 0.8 - 1.5 MG/DL    GFR est AA >60 >60 ml/min/1.73m2    GFR est non-AA >60 >60 ml/min/1.73m2    Calcium 8.1 (L) 8.3 - 10.4 MG/DL   CBC WITH AUTOMATED DIFF    Collection Time: 08/22/17  5:15 AM   Result Value Ref Range    WBC 7.0 4.3 - 11.1 K/uL    RBC 3.46 (L) 4.23 - 5.67 M/uL    HGB 12.0 (L) 13.6 - 17.2 g/dL    HCT 34.7 (L) 41.1 - 50.3 %    .3 (H) 79.6 - 97.8 FL    MCH 34.7 (H) 26.1 - 32.9 PG    MCHC 34.6 31.4 - 35.0 g/dL    RDW 13.5 11.9 - 14.6 %    PLATELET 243 405 - 415 K/uL    MPV 10.0 (L) 10.8 - 14.1 FL    DF AUTOMATED      NEUTROPHILS 74 43 - 78 %    LYMPHOCYTES 12 (L) 13 - 44 %    MONOCYTES 12 4.0 - 12.0 %    EOSINOPHILS 2 0.5 - 7.8 %    BASOPHILS 0 0.0 - 2.0 %    IMMATURE GRANULOCYTES 0.4 0.0 - 5.0 %    ABS. NEUTROPHILS 5.1 1.7 - 8.2 K/UL    ABS. LYMPHOCYTES 0.9 0.5 - 4.6 K/UL    ABS. MONOCYTES 0.9 0.1 - 1.3 K/UL    ABS. EOSINOPHILS 0.2 0.0 - 0.8 K/UL    ABS. BASOPHILS 0.0 0.0 - 0.2 K/UL    ABS. IMM. GRANS. 0.0 0.0 - 0.5 K/UL   PLEASE READ & DOCUMENT PPD TEST IN 24 HRS    Collection Time: 08/22/17  4:03 PM   Result Value Ref Range    PPD negative Negative    mm Induration 0 mm       All Micro Results     Procedure Component Value Units Date/Time    MSSA/MRSA SC BY PCR, NASAL SWAB [[id number]] Collected:  08/21/17 1609    Order Status:  Completed Specimen:  Nares Updated:  08/21/17 1943     Special Requests: NO SPECIAL REQUESTS        Culture result:         SA target not detected. A MRSA NEGATIVE, SA NEGATIVE test result does not preclude MRSA or SA nasal colonization. MSSA/MRSA SC BY PCR, NASAL SWAB [[id number]] Collected:  08/21/17 1245    Order Status:  Canceled Specimen:  Swab           Imaging /Procedures /Studies:    CXR Results  (Last 48 hours)               08/21/17 1134  XR CHEST SNGL V Final result    Narrative:  History: Preoperative evaluation for right hip fracture       Exam: portable chest       Comparison: None       Findings: Median sternotomy wires are present. No focal alveolar infiltrate or   pleural effusion. Linear calcified density seen along the right basilar pleural   margin. No pneumothorax. Impressions: No acute abnormality. Xr Hip Rt W Or Wo Pelv 2-3 Vws  Result Date: 8/21/2017  IMPRESSION: 1. Post surgical changes of the right hip as described above.        Labs and Studies from previous 24 hours have been personally reviewed by myself [place] Problems    Diagnosis Date Noted    Hip fx ([address] 08/21/2017    Coronary atherosclerosis of native coronary artery 11/02/2015    Hyperlipidemia 11/02/2015    Esophageal reflux      Hospital Problems as of 8/22/2017  Date Reviewed: 10/25/2016          Codes Class Noted - Resolved POA    * (Principal)Hip fx ([facility] ICD-10-CM: A59.082Z  ICD-9-CM: 820.8  8/21/2017 - Present Yes        Coronary atherosclerosis of native coronary artery ICD-10-CM: I25.10  ICD-9-CM: 414.01  11/2/2015 - Present Yes        Hyperlipidemia ICD-10-CM: E78.5  ICD-9-CM: 272.4  11/2/2015 - Present Yes        Esophageal reflux ICD-10-CM: K21.9  ICD-9-CM: 530.81  Unknown - Present Yes              Plan:  - s.p R hemiarthroplasty 8/21 - uneventful. PRN pain medications per orthopedics  - continue HTN meds- BP well controlled   - PT/OT/PPD - plan for SNF on Thursday    DVT Prophylaxis: Lovenox SQ  CODE Status: Full CODE  Plan of Care Discussed with: patient.  Care team.  Medical Risk: high - on IV dilaudid  Disposition: SNF on thursday    Moisés Hogan MD  08/22/17

## 2017-08-22 NOTE — PROGRESS NOTES
Problem: Interdisciplinary Rounds  Goal: Interdisciplinary Rounds  Outcome: Progressing Towards Goal  Interdisciplinary team rounds were held 8/22/2017 with the following team members:Care Management, Physical Therapy, Physician and . Anticipate discharge to rehab on Thursday. Plan of care discussed. See clinical pathway and/or care plan for interventions and desired outcomes.

## 2017-08-22 NOTE — PROGRESS NOTES
Pt in room with LR to L forearm infusing. Family at bedside. Mermaid wrap and KI to RLE. Pt denies pain. Pt reports n/v and was medicated with zofran. Eduardo intact and draining to gravity. Urine is clear and yellow. Skin assessment completed by this RN and GRADY Sandy.

## 2017-08-22 NOTE — PROGRESS NOTES
Problem: Mobility Impaired (Adult and Pediatric)  Goal: *Acute Goals and Plan of Care (Insert Text)  STG:  (1.)Mr. Blum will move from supine to sit and sit to supine , scoot up and down and roll side to side with CONTACT GUARD ASSIST within 3 day(s). (2.)Mr. Blum will transfer from bed to chair and chair to bed with STAND BY ASSIST using the least restrictive device within 3 day(s). (3.)Mr. Blum will ambulate with STAND BY ASSIST for 50 feet with the least restrictive device within 3 day(s). (4.)Mr. Blum will maintain hip precautions RLE throughout all functional mobility within 3 days with 0 verbal cues. (5.)Mr. Blum will perform LE exercises with 1 to 2 cues for form within 3 days to improve strength for functional transfers and ambulation. LTG:  (1.)Mr. Blum will move from supine to sit and sit to supine , scoot up and down and roll side to side in bed with STAND BY ASSIST within 7 day(s). (2.)Mr. Blum will transfer from bed to chair and chair to bed with SUPERVISION using the least restrictive device within 7 day(s). (3.)Mr. Blum will ambulate with SUPERVISION for 125 feet with the least restrictive device within 7 day(s). ________________________________________________________________________________________________      PHYSICAL THERAPY: INITIAL ASSESSMENT, AM 8/22/2017  INPATIENT: Hospital Day: 2  [place] / Product Type: Medicare /    WBAT RLE, hip precautions RLE, knee immobilizer when in bed or chair  NAME/AGE/GENDER: Good Frances is a 69 y.o. male    PRIMARY DIAGNOSIS: Right Hip Fracture   Hip fx ([address] Hip fx ([address] Hip fx ([address]  Procedure(s) (LRB):  Right HIP HEMIARTHROPLASTY (Right)  1 Day Post-Op  ICD-10: Treatment Diagnosis:       · Difficulty in walking, Not elsewhere classified (R26.2)   Precaution/Allergies:  Phenergan [promethazine]       ASSESSMENT:      Mr. Maye Contreras is a 69 y.o. male s/p R hip fracture.  He is typically very independent, drives and ambulates independently. He does have a history of spinal pain and is supposed to have back surgery in the near future per wife. He has B hearing aids to wear and was able to place independently. He is home during the day alone as one of his daughters is mentally disabled, wife and other daughter work. He is supine on contact and agreeable to therapy evaluation. Educated on hip precautions and removed knee immobilizer for mobility this AM. He transferred to sitting with moderate assistance, stood with minimal assist and ambulated to recliner with minimal assist. Remained in chair at the end of the session. Wife appears anxious, reporting that she is very concerned about patient's ability to climb to 2nd floor of home (up 7 steps) although she reports they do have an accessible bedroom on the first floor. Vadim Sanchez is currently functioning below his baseline and would benefit from skilled PT during acute care stay to maximize safety and independence with functional mobility. This section established at most recent assessment   PROBLEM LIST (Impairments causing functional limitations):  1. Decreased Strength  2. Decreased ADL/Functional Activities  3. Decreased Transfer Abilities  4. Decreased Ambulation Ability/Technique  5. Decreased Balance  6. Increased Pain  7. Decreased Knowledge of Precautions  8. Decreased [place] with Home Exercise Program    INTERVENTIONS PLANNED: (Benefits and precautions of physical therapy have been discussed with the patient.)  1. Balance Exercise  2. Bed Mobility  3. Family Education  4. Gait Training  5. Home Exercise Program (HEP)  6. Therapeutic Activites  7. Therapeutic Exercise/Strengthening  8. Transfer Training  9. Patient Education  10.  Group Therapy      TREATMENT PLAN: Frequency/Duration: twice daily for duration of hospital stay  Rehabilitation Potential For Stated Goals: GOOD      RECOMMENDED REHABILITATION/EQUIPMENT: (at time of discharge pending progress): Continue Skilled Therapy. HISTORY:   History of Present Injury/Illness (Reason for Referral):  Per MD Note: \"Mr. Kitty Rios is a 70 yo male who presented after falling at home landing on his right hip. Pt reports was working on his car and opened his radiator cap and fell backwards trying to avoid getting burned. Pt found to have a right femoral neck fx. Pt with hx of HTN, CABG X4, bilateral knee replacements, cholecystectomy. Last echo Feb 2016 with EF 50-55%. Reports hx of difficulty with anesthesia, severe vomiting, difficulty weaning from vent. Pt denies LOC, hitting head, syncope, dizziness, n/v/d, CP, SOB. \"  Past Medical History/Comorbidities:   Mr. Kitty Rios  has a past medical history of Arteriosclerosis of coronary artery bypass graft (11/2/2015); CAD (coronary artery disease); Coronary atherosclerosis of native coronary artery (11/2/2015); Dyspnea/Shortness of breath (11/2/2015); Elevated prostate specific antigen (PSA); Esophageal reflux; Extremity atherosclerosis with intermittent claudication ([address] (11/2/2015); Heart disease, unspecified; Hyperlipidemia (11/2/2015); Hypertrophy of prostate with urinary obstruction and other lower urinary tract symptoms (LUTS); and Pure hypercholesterolemia. Mr. Kitty Rios  has a past surgical history that includes orthopaedic; cholecystectomy; coronary artery bypass graft; and knee replacement (Left).   Social History/Living Environment:   Home Environment: Private residence  One/Two Story Residence: One story  Living Alone: No  Support Systems: Spouse/Significant Other/Partner  Patient Expects to be Discharged to[de-identified] Unknown  Current DME Used/Available at Home: None  Prior Level of Function/Work/Activity:  independent      Number of Personal Factors/Comorbidities that affect the Plan of Care: 1-2: MODERATE COMPLEXITY   EXAMINATION:   Most Recent Physical Functioning:   Gross Assessment:  AROM: Generally decreased, functional  PROM: Generally decreased, functional  Strength: Generally decreased, functional  Coordination: Generally decreased, functional  Tone: Normal  Sensation: Intact               Posture:  Posture (WDL): Exceptions to WDL  Posture Assessment: Forward head, Rounded shoulders  Balance:  Sitting: Impaired  Sitting - Static: Good (unsupported)  Sitting - Dynamic: Fair (occasional)  Standing: Impaired  Standing - Static: Fair  Standing - Dynamic : Fair Bed Mobility:  Supine to Sit: Moderate assistance  Scooting: Contact guard assistance  Wheelchair Mobility:     Transfers:  Sit to Stand: Minimum assistance; Moderate assistance  Stand to Sit: Moderate assistance;Minimum assistance  Bed to Chair: Moderate assistance  Gait:  Right Side Weight Bearing: As tolerated  Left Side Weight Bearing: Full  Base of Support: Center of gravity altered;Narrowed  Speed/Mei: Slow;Shuffled;Pace decreased (<100 feet/min)  Step Length: Right shortened  Gait Abnormalities: Decreased step clearance; Antalgic;Trunk sway increased  Distance (ft): 3 Feet (ft)  Assistive Device: Walker, rolling  Ambulation - Level of Assistance: Minimal assistance  Interventions: Safety awareness training;Verbal cues; Visual/Demos       Body Structures Involved:  1. Nerves  2. Bones  3. Joints  4. Muscles  5. Ligaments Body Functions Affected:  1. Sensory/Pain  2. Neuromusculoskeletal  3. Movement Related Activities and Participation Affected:  1. Mobility  2. Self Care  3. Domestic Life  4. Interpersonal Interactions and Relationships  5. Community, Social and [place]   Number of elements that affect the Plan of Care: 4+: HIGH COMPLEXITY   CLINICAL PRESENTATION:   Presentation: Stable and uncomplicated: LOW COMPLEXITY   CLINICAL DECISION MAKIN [facility] Inpatient Short Form  How much difficulty does the patient currently have. .. Unable A Lot A Little None   1.   Turning over in bed (including adjusting bedclothes, sheets and blankets)? [ ] 1   [ ] 2   [X] 3   [ ] 4   2. Sitting down on and standing up from a chair with arms ( e.g., wheelchair, bedside commode, etc.)   [ ] 1   [ ] 2   [X] 3   [ ] 4   3. Moving from lying on back to sitting on the side of the bed? [ ] 1   [X] 2   [ ] 3   [ ] 4   How much help from another person does the patient currently need. .. Total A Lot A Little None   4. Moving to and from a bed to a chair (including a wheelchair)? [ ] 1   [X] 2   [ ] 3   [ ] 4   5. Need to walk in hospital room? [ ] 1   [X] 2   [ ] 3   [ ] 4   6. Climbing 3-5 steps with a railing? [ ] 1   [X] 2   [ ] 3   [ ] 4   © 2007, Trustees of [address] [place], [place] [address], under license to [employer]. All rights reserved    Score:  Initial: 14 Most Recent: X (Date: -- )     Interpretation of Tool:  Represents activities that are increasingly more difficult (i.e. Bed mobility, Transfers, Gait). Score 24 23 22-20 19-15 14-10 9-7 6       Modifier CH CI CJ CK CL CM CN         · Mobility - Walking and Moving Around:               - CURRENT STATUS:    CL - 60%-79% impaired, limited or restricted               - GOAL STATUS:           CK - 40%-59% impaired, limited or restricted               - D/C STATUS:                       ---------------To be determined---------------  [place] / [place] / Product Type: Medicare /       Medical Necessity:     · Patient demonstrates good rehab potential due to higher previous functional level. Reason for Services/Other Comments:  · Patient continues to require modification of therapeutic interventions to increase complexity of exercises.    Use of outcome tool(s) and clinical judgement create a POC that gives a: Clear prediction of patient's progress: LOW COMPLEXITY                 TREATMENT:   (In addition to Assessment/Re-Assessment sessions the following treatments were rendered)   Pre-treatment Symptoms/Complaints:  none  Pain: Initial:   Pain Intensity 1: 6  Post Session:  6/10      Assessment/Reassessment only, no treatment provided today     Braces/Orthotics/Lines/Etc:   · IV  · O2 Device: Room air  Treatment/Session Assessment:    · Response to Treatment:  Patient tolerated treatment well. · Interdisciplinary Collaboration:  · Physical Therapist  · Registered Nurse  · After treatment position/precautions:  · Up in chair  · Bed alarm/tab alert on  · Bed/Chair-wheels locked  · Bed in low position  · Call light within reach  · RN notified  · Family at bedside  · Compliance with Program/Exercises: Will assess as treatment progresses. · Recommendations/Intent for next treatment session: \"Next visit will focus on advancements to more challenging activities and reduction in assistance provided\".   Total Treatment Duration:  PT Patient Time In/Time Out  Time In: 0922  Time Out: 0945     Osvaldo Li, DPT

## 2017-08-22 NOTE — OP NOTES
Operative Report     Patient: Angelia Mcfadden MRN: [id number]  SSN: [id number]    YOB: 1940  Age: 69 y.o. Sex: male      Indications: Angelia Mcfadden was admitted to the hospital with a brief history of right femoral neck fracture. The patient now presents for right Hip Hemiarthroplasty. Date of Surgery: 8/21/2017     Preoperative diagnosis:  Right Hip Fracture     Postoperative diagnosis: right femoral head fracture    Surgeon(s) and Role:     * Aidan Ruelas MD - Primary    Anesthesia: General    Procedure:  Procedure(s):  Right HIP HEMIARTHROPLASTY       Procedure in Detail:   The patient is brought to the operative suite and placed in the supine position. After adequate anesthesia is achieved in the form of spinal, the patient is placed in the lateral decubitus position with the right side up. Down leg is well padded and axillary roll is placed. The righthip is then prepped and draped in the usual sterile fashion. Standard posterior approach to the hip is performed. Hemostasis is achieved with Bovie cautery. The fascia is sharply incised along the line of the skin incision and the gluteus fibers are split along their length. The sciatic nerve is identified and protected. Steinmann pin is inserted above the pyriformis tendon and tapped into the bone in order to aid as a retractor. The [facility] retractor is placed beneath the lesser trochanter and then Bovie cautery is used to take down the capsule and short external rotators and reflect them posteriorly. A second Steinmann pin is inserted in the rim of the acetabulum posteriorly, also to aid in retraction. The underlying subcapital fracture is identified. The head is removed with a corkscrew. The acetabulum is examined and noted to be in good condition. Trial bipolar heads are utilized. After an appropriate suction fit is attained, we turned our attention to the femur.   Biplanar cut is made 10 millimeters proximal to the lesser trochanter. The canal finder is used to identify the canal.  Sequential reamings and broachings are performed until the broach has an excellent, secure fit. Calcar reamers are used to level the calcar. The hip is then trialed with bipolar head and neck. After the hip is reduced it is taken through range of motion. When the hip is deemed flexion to 90 degrees, internal rotation to 30 degrees, then adduction to 30 degrees is obtained without the hip dislocating. Bone hook is then used to dislocate the hip and the trials are removed. The hip is irrigated with three liters of pulse lavage. The [employer] Hip System has been utilized. The appropriate size femoral prosthesis is then inserted with excellent purchase. The appropriate size neck and bipolar head are then tapped onto the [facility] CONT[facility]UING [facility]. The hip is then reduced, taken through range of motion with the previously mentioned results. Capsule and short external rotators are repaired to the greater trochanter through drill holes. The remainder of the wound is closed in layers without a drain. Sterile compressive dressings are applied. The patient is then converted to a supine position. Knee immobilizer is applied. Then removed from the table and transferred to the postanesthesia care unit, alert and oriented, under the care of Anesthesia. Estimated Blood Loss: 200 CC    Specimens: * No specimens in log *     Implant:   Implant Name Type Inv.  Item Serial No.  Lot No. LRB No. Used Action   HEAD FEM 28MM +5MM NK --  - [id number]  HEAD FEM 28MM +5MM NK --   [facility] [employer] [id number] Right 1 Implanted   HEAD FEM SLF-CENTER 53X28 CAMERON --  - [id number]  HEAD FEM SLF-CENTER 53X28 CAMERON --   [facility] [employer] [id number] Right 1 Implanted   STEM FEM LAT COXA VARA SZ 15 -- CORAIL2 - [id number]   STEM FEM LAT COXA VARA SZ 15 -- CORAIL2   [facility] ORTHOPEDICS [id number] Right 1 Implanted       Closure: Primary    Complications: None    Signed By: John Anderson MD     August 22, 2017

## 2017-08-22 NOTE — PROGRESS NOTES
Care Management Interventions  Mode of Transport at Discharge: S  Transition of Care Consult (CM Consult): Discharge Planning  Discharge Durable Medical Equipment: No  Physical Therapy Consult: Yes  Occupational Therapy Consult: Yes  Speech Therapy Consult: No  Current Support Network: Own Home, Lives with Spouse, Family Lives Nearby  Confirm Follow Up Transport: Family  Plan discussed with Pt/Family/Caregiver: Yes  Freedom of Choice Offered: Yes  Discharge Location  Discharge Placement: Rehab Unit Subacute    Pt is a 70 yo male admitted for surgical repair of a hip fracture. Pt would benefit from STR services at discharge. KEVIN met with pt/spouse/family to discuss choice of subacute rehab facility. They requested a referral to [address]. Referral submitted requesting a bed for Thursday. Awaiting a response. PPD placed 8/21/17. SW following to facilitate pt's transfer to rehab at discharge. 1624:  Pt has been accepted for admission to [place] [address] and [facility] for Thursday if he is medically cleared for discharge. KEVIN notified pt of bed offer and plan and he is in agreement.

## 2017-08-22 NOTE — BRIEF OP NOTE
BRIEF OPERATIVE NOTE    Date of Procedure: 8/21/2017   Preoperative Diagnosis: Right Hip Fracture   Postoperative Diagnosis: right femoral head fracture    Procedure(s):  Right HIP HEMIARTHROPLASTY  Surgeon(s) and Role:     * Lai Narayan MD - Primary         Assistant Staff:       Surgical Staff:  Circ-1: Celestino Reyes RN  Circ-Relief: Talita Escobar RN  Scrub Tech-1: Pio Al  Scrub Tech-2: Devonte Lemon  Scrub Private/Assistant: José Luis Cristina  Event Time In   Incision Start 1746   Incision Close 1836     Anesthesia: General   Estimated Blood Loss: 200 CC  Specimens: * No specimens in log *   Findings: NONE   Complications: NONE  Implants:   Implant Name Type Inv.  Item Serial No.  Lot No. LRB No. Used Action   HEAD FEM 28MM +5MM NK --  - [id number]  HEAD FEM 28MM +5MM NK --   [facility] [id number] Right 1 Implanted   HEAD FEM SLF-CENTER 53X28 CAMERON --  - [id number]  HEAD FEM SLF-CENTER 53X28 CAMERON --   [facility] ORTHOPEDICS [id number] Right 1 Implanted   STEM FEM LAT COXA VARA SZ 15 -- CORAIL2 - [id number]   STEM FEM LAT COXA VARA SZ 15 -- CORAIL2   [facility] ORTHOPEDICS [id number] Right 1 Implanted

## 2017-08-23 LAB
ANION GAP SERPL CALC-SCNC: 7 MMOL/L (ref 7–16)
BASOPHILS # BLD: 0 K/UL (ref 0–0.2)
BASOPHILS NFR BLD: 0 % (ref 0–2)
BUN SERPL-MCNC: 16 MG/DL (ref 8–23)
CALCIUM SERPL-MCNC: 8.8 MG/DL (ref 8.3–10.4)
CHLORIDE SERPL-SCNC: 107 MMOL/L (ref 98–107)
CO2 SERPL-SCNC: 25 MMOL/L (ref 21–32)
CREAT SERPL-MCNC: 1.03 MG/DL (ref 0.8–1.5)
DIFFERENTIAL METHOD BLD: ABNORMAL
EOSINOPHIL # BLD: 0.2 K/UL (ref 0–0.8)
EOSINOPHIL NFR BLD: 2 % (ref 0.5–7.8)
ERYTHROCYTE [DISTWIDTH] IN BLOOD BY AUTOMATED COUNT: 13.6 % (ref 11.9–14.6)
GLUCOSE SERPL-MCNC: 116 MG/DL (ref 65–100)
HCT VFR BLD AUTO: 32.4 % (ref 41.1–50.3)
HGB BLD-MCNC: 11.2 G/DL (ref 13.6–17.2)
IMM GRANULOCYTES # BLD: 0 K/UL (ref 0–0.5)
IMM GRANULOCYTES NFR BLD: 0.4 % (ref 0–5)
LYMPHOCYTES # BLD: 0.9 K/UL (ref 0.5–4.6)
LYMPHOCYTES NFR BLD: 10 % (ref 13–44)
MCH RBC QN AUTO: 34.7 PG (ref 26.1–32.9)
MCHC RBC AUTO-ENTMCNC: 34.6 G/DL (ref 31.4–35)
MCV RBC AUTO: 100.3 FL (ref 79.6–97.8)
MM INDURATION POC: 0 MM (ref 0–5)
MONOCYTES # BLD: 1.3 K/UL (ref 0.1–1.3)
MONOCYTES NFR BLD: 15 % (ref 4–12)
NEUTS SEG # BLD: 6.5 K/UL (ref 1.7–8.2)
NEUTS SEG NFR BLD: 73 % (ref 43–78)
PLATELET # BLD AUTO: 192 K/UL (ref 150–450)
PMV BLD AUTO: 10 FL (ref 10.8–14.1)
POTASSIUM SERPL-SCNC: 4.2 MMOL/L (ref 3.5–5.1)
PPD POC: NEGATIVE NEGATIVE
RBC # BLD AUTO: 3.23 M/UL (ref 4.23–5.67)
SODIUM SERPL-SCNC: 139 MMOL/L (ref 136–145)
WBC # BLD AUTO: 9 K/UL (ref 4.3–11.1)

## 2017-08-23 PROCEDURE — 97116 GAIT TRAINING THERAPY: CPT

## 2017-08-23 PROCEDURE — 74011250636 HC RX REV CODE- 250/636: Performed by: ORTHOPAEDIC SURGERY

## 2017-08-23 PROCEDURE — 80048 BASIC METABOLIC PNL TOTAL CA: CPT | Performed by: ORTHOPAEDIC SURGERY

## 2017-08-23 PROCEDURE — 74011250637 HC RX REV CODE- 250/637: Performed by: EMERGENCY MEDICINE

## 2017-08-23 PROCEDURE — 74011250637 HC RX REV CODE- 250/637: Performed by: NURSE PRACTITIONER

## 2017-08-23 PROCEDURE — 74011250637 HC RX REV CODE- 250/637: Performed by: HOSPITALIST

## 2017-08-23 PROCEDURE — 74011250636 HC RX REV CODE- 250/636: Performed by: HOSPITALIST

## 2017-08-23 PROCEDURE — 97150 GROUP THERAPEUTIC PROCEDURES: CPT

## 2017-08-23 PROCEDURE — 65270000029 HC RM PRIVATE

## 2017-08-23 PROCEDURE — 74011250637 HC RX REV CODE- 250/637: Performed by: ORTHOPAEDIC SURGERY

## 2017-08-23 PROCEDURE — 77010033678 HC OXYGEN DAILY

## 2017-08-23 PROCEDURE — 36415 COLL VENOUS BLD VENIPUNCTURE: CPT | Performed by: ORTHOPAEDIC SURGERY

## 2017-08-23 PROCEDURE — 85025 COMPLETE CBC W/AUTO DIFF WBC: CPT | Performed by: ORTHOPAEDIC SURGERY

## 2017-08-23 PROCEDURE — 94762 N-INVAS EAR/PLS OXIMTRY CONT: CPT

## 2017-08-23 PROCEDURE — 94760 N-INVAS EAR/PLS OXIMETRY 1: CPT

## 2017-08-23 RX ORDER — FUROSEMIDE 10 MG/ML
20 INJECTION INTRAMUSCULAR; INTRAVENOUS EVERY 12 HOURS
Status: DISCONTINUED | OUTPATIENT
Start: 2017-08-23 | End: 2017-08-24

## 2017-08-23 RX ORDER — FUROSEMIDE 40 MG/1
40 TABLET ORAL ONCE
Status: COMPLETED | OUTPATIENT
Start: 2017-08-23 | End: 2017-08-23

## 2017-08-23 RX ADMIN — CALCIUM CARBONATE 500 MG (1,250 MG)-VITAMIN D3 200 UNIT TABLET 1 TABLET: at 17:40

## 2017-08-23 RX ADMIN — ACETAMINOPHEN 650 MG: 325 TABLET ORAL at 14:00

## 2017-08-23 RX ADMIN — Medication 10 ML: at 21:59

## 2017-08-23 RX ADMIN — Medication 5 ML: at 14:01

## 2017-08-23 RX ADMIN — RANOLAZINE 1000 MG: 500 TABLET, FILM COATED, EXTENDED RELEASE ORAL at 07:43

## 2017-08-23 RX ADMIN — TRAMADOL HYDROCHLORIDE 50 MG: 50 TABLET, FILM COATED ORAL at 17:40

## 2017-08-23 RX ADMIN — TRAMADOL HYDROCHLORIDE 50 MG: 50 TABLET, FILM COATED ORAL at 06:43

## 2017-08-23 RX ADMIN — CALCIUM CARBONATE 500 MG (1,250 MG)-VITAMIN D3 200 UNIT TABLET 1 TABLET: at 14:00

## 2017-08-23 RX ADMIN — PANTOPRAZOLE SODIUM 40 MG: 40 TABLET, DELAYED RELEASE ORAL at 06:39

## 2017-08-23 RX ADMIN — CALCIUM CARBONATE 500 MG (1,250 MG)-VITAMIN D3 200 UNIT TABLET 1 TABLET: at 07:44

## 2017-08-23 RX ADMIN — ASPIRIN 81 MG 81 MG: 81 TABLET ORAL at 07:43

## 2017-08-23 RX ADMIN — FUROSEMIDE 40 MG: 40 TABLET ORAL at 09:06

## 2017-08-23 RX ADMIN — METOPROLOL SUCCINATE 12.5 MG: 25 TABLET, EXTENDED RELEASE ORAL at 07:44

## 2017-08-23 RX ADMIN — Medication 5 ML: at 06:46

## 2017-08-23 RX ADMIN — ATORVASTATIN CALCIUM 80 MG: 40 TABLET, FILM COATED ORAL at 07:43

## 2017-08-23 RX ADMIN — FUROSEMIDE 20 MG: 10 INJECTION, SOLUTION INTRAMUSCULAR; INTRAVENOUS at 19:00

## 2017-08-23 RX ADMIN — ACETAMINOPHEN 650 MG: 325 TABLET ORAL at 21:59

## 2017-08-23 RX ADMIN — ACETAMINOPHEN 650 MG: 325 TABLET ORAL at 06:39

## 2017-08-23 RX ADMIN — RANOLAZINE 1000 MG: 500 TABLET, FILM COATED, EXTENDED RELEASE ORAL at 21:59

## 2017-08-23 RX ADMIN — ENOXAPARIN SODIUM 30 MG: 30 INJECTION SUBCUTANEOUS at 19:00

## 2017-08-23 NOTE — PROGRESS NOTES
[facility] FRACTURE PROGRESS NOTE    2017  Admit Date:   2017    Post Op day: 2 Days Post-Op    Subjective:    Josh Minors [place] PATIENT UP IN RECLINER - EATING LUNCH     PT/OT:   Gait:  Gait  Base of Support: Center of gravity altered, Narrowed  Speed/Mei: Slow, Shuffled, Pace decreased (<100 feet/min)  Step Length: Right shortened  Gait Abnormalities: Decreased step clearance, Antalgic, Path deviations, Trunk sway increased  Ambulation - Level of Assistance: Contact guard assistance  Distance (ft): 15 Feet (ft)  Assistive Device: Walker, rolling  Interventions: Safety awareness training, Verbal cues, Visual/Demos            Interventions: Safety awareness training, Verbal cues, Visual/Demos    Vital Signs:    Patient Vitals for the past 8 hrs:   BP Temp Pulse Resp SpO2   17 0740 99/62 97.9 °F (36.6 °C) 98 16 91 %   17 0604 - - - - 90 %   17 0433 136/62 99.1 °F (37.3 °C) (!) 103 16 90 %     Temp (24hrs), Av.2 °F (36.8 °C), Min:97.7 °F (36.5 °C), Max:99.1 °F (37.3 °C)      Pain Control:   Pain Assessment  Pain Scale 1: Numeric (0 - 10)  Pain Intensity 1: 5  Pain Onset 1: post op  Pain Location 1: Hip  Pain Orientation 1: Right  Pain Description 1: Aching  Pain Intervention(s) 1: Medication (see MAR)    Meds:    Current Facility-Administered Medications   Medication Dose Route Frequency    PPD Read Reminder  1 Each Other Q24H    aspirin chewable tablet 81 mg  81 mg Oral DAILY    acetaminophen (TYLENOL) tablet 650 mg  650 mg Oral Q8H    HYDROmorphone (PF) (DILAUDID) injection 0.5 mg  0.5 mg IntraVENous Q4H PRN    traMADol (ULTRAM) tablet 50 mg  50 mg Oral Q6H PRN    sodium chloride (NS) flush 5-10 mL  5-10 mL IntraVENous Q8H    sodium chloride (NS) flush 5-10 mL  5-10 mL IntraVENous PRN    alum-mag hydroxide-simeth (MYLANTA) oral suspension 30 mL  30 mL Oral Q4H PRN    calcium-vitamin D (OS-CHRISTINA) 500 mg-200 unit tablet  1 Tab Oral TID WITH MEALS    oxyCODONE IR (ROXICODONE) tablet 5 mg  5 mg Oral Q4H PRN    enoxaparin (LOVENOX) injection 30 mg  30 mg SubCUTAneous Q24H    ranolazine ER (RANEXA) tablet 1,000 mg  1,000 mg Oral Q12H    metoprolol succinate (TOPROL-XL) XL tablet 12.5 mg  12.5 mg Oral DAILY    pantoprazole (PROTONIX) tablet 40 mg  40 mg Oral ACB    atorvastatin (LIPITOR) tablet 80 mg  80 mg Oral DAILY    ondansetron (ZOFRAN) injection 4 mg  4 mg IntraVENous Q4H PRN       LAB:    Recent Labs      08/23/17   0631   08/21/17   1200   HCT  32.4*   < >  37.9*   HGB  11.2*   < >  13.6   INR   --    --   1.0    < > = values in this interval not displayed.        24 Hour Assessment Issues:    Oriented    Discharge Planning: SNF    Transfuse PRBC's:      Assessment & Physician's Comment:  Dressing is clean, dry, and intact  Neurovascular checks within normal limits    Principal Problem:    Hip fx ([address] (8/21/2017)    Active Problems:    Esophageal reflux ()      Coronary atherosclerosis of native coronary artery (11/2/2015)      Hyperlipidemia (11/2/2015)        Plan:  CONTINUE THERAPY  WBAT  SNF FOR REHAB    Kartik Tolliver NP

## 2017-08-23 NOTE — PROGRESS NOTES
Problem: Mobility Impaired (Adult and Pediatric)  Goal: *Acute Goals and Plan of Care (Insert Text)  STG:  (1.)Mr. Blum will move from supine to sit and sit to supine , scoot up and down and roll side to side with CONTACT GUARD ASSIST within 3 day(s). (2.)Mr. Blum will transfer from bed to chair and chair to bed with STAND BY ASSIST using the least restrictive device within 3 day(s). (3.)Mr. Blum will ambulate with STAND BY ASSIST for 50 feet with the least restrictive device within 3 day(s). Goal met 8/23/2017  (4.)Mr. Blum will maintain hip precautions RLE throughout all functional mobility within 3 days with 0 verbal cues. (5.)Mr. Blum will perform LE exercises with 1 to 2 cues for form within 3 days to improve strength for functional transfers and ambulation. LTG:  (1.)Mr. Blum will move from supine to sit and sit to supine , scoot up and down and roll side to side in bed with STAND BY ASSIST within 7 day(s). (2.)Mr. Blum will transfer from bed to chair and chair to bed with SUPERVISION using the least restrictive device within 7 day(s). (3.)Mr. Blum will ambulate with SUPERVISION for 125 feet with the least restrictive device within 7 day(s). PHYSICAL THERAPY: Daily Note, Treatment Day: 1st and AM 8/23/2017  INPATIENT: Hospital Day: 3  [place] / Product Type: Medicare /    WBAT RLE, hip precautions RLE, knee immobilizer when in bed or chair  NAME/AGE/GENDER: Paulino Solares is a 69 y.o. male    PRIMARY DIAGNOSIS: Right Hip Fracture   Hip fx ([facility] [address] Hip fx ([facility] [address] Hip fx ([facility] [address]  Procedure(s) (LRB):  Right HIP HEMIARTHROPLASTY (Right)  2 Days Post-Op  ICD-10: Treatment Diagnosis:       · Difficulty in walking, Not elsewhere classified (R26.2)   Precaution/Allergies:  Phenergan [promethazine]       ASSESSMENT:      Mr. Keri Salcedo is a 69 y.o. male s/p R hip fracture who is now WBAT s/p above surgery.  Patient able to perform supine to sit with min assist, additional time, and cues for improved technique. Transfers to standing with CGA and cues for improved technique/hand placement. Once standing patient able to perform gait training x 60' with use of rolling walker, SBA, and below cues in gait section. Patient was later rolled to therapy gym to participate in group therapeutic strengthening exercises to improve functional strength for transfers, gait and overall mobility. Patient requires cues and assistance to perform exercises correctly. Overall good progress towards physical therapy goals as noted by increased gait distance and ability to perform LE exercises. Above goals in red have been met thus far. Will continue efforts. This section established at most recent assessment   PROBLEM LIST (Impairments causing functional limitations):  1. Decreased Strength  2. Decreased ADL/Functional Activities  3. Decreased Transfer Abilities  4. Decreased Ambulation Ability/Technique  5. Decreased Balance  6. Increased Pain  7. Decreased Knowledge of Precautions  8. Decreased [place] with Home Exercise Program    INTERVENTIONS PLANNED: (Benefits and precautions of physical therapy have been discussed with the patient.)  1. Balance Exercise  2. Bed Mobility  3. Family Education  4. Gait Training  5. Home Exercise Program (HEP)  6. Therapeutic Activites  7. Therapeutic Exercise/Strengthening  8. Transfer Training  9. Patient Education  10. Group Therapy      TREATMENT PLAN: Frequency/Duration: twice daily for duration of hospital stay  Rehabilitation Potential For Stated Goals: GOOD      RECOMMENDED REHABILITATION/EQUIPMENT: (at time of discharge pending progress): Continue Skilled Therapy. HISTORY:   History of Present Injury/Illness (Reason for Referral):  Per MD Note: \"Mr. De Joe is a 68 yo male who presented after falling at home landing on his right hip.   Pt reports was working on his car and opened his radiator cap and fell backwards trying to avoid getting burned. Pt found to have a right femoral neck fx. Pt with hx of HTN, CABG X4, bilateral knee replacements, cholecystectomy. Last echo Feb 2016 with EF 50-55%. Reports hx of difficulty with anesthesia, severe vomiting, difficulty weaning from vent. Pt denies LOC, hitting head, syncope, dizziness, n/v/d, CP, SOB. \"  Past Medical History/Comorbidities:   Mr. Serenity Loaiza  has a past medical history of Arteriosclerosis of coronary artery bypass graft (11/2/2015); CAD (coronary artery disease); Coronary atherosclerosis of native coronary artery (11/2/2015); Dyspnea/Shortness of breath (11/2/2015); Elevated prostate specific antigen (PSA); Esophageal reflux; Extremity atherosclerosis with intermittent claudication ([facility] [address] (11/2/2015); Heart disease, unspecified; Hyperlipidemia (11/2/2015); Hypertrophy of prostate with urinary obstruction and other lower urinary tract symptoms (LUTS); and Pure hypercholesterolemia. Mr. Serenity Loaiza  has a past surgical history that includes orthopaedic; cholecystectomy; coronary artery bypass graft; and knee replacement (Left).   Social History/Living Environment:   Home Environment: Other (comment) (tri-level )  # Steps to Enter: 4  Rails to Enter: Yes  Hand Rails : Right  One/Two Story Residence: Other (Comment) (tri-level)  # of Interior Steps: 7  Interior Rails: Left  Living Alone: No  Support Systems: Spouse/Significant Other/Partner, Child(benjy)  Patient Expects to be Discharged to[de-identified] Rehabilitation facility  Current DME Used/Available at Home: Shower chair, Walker, rolling  Tub or Shower Type: Shower  Prior Level of Function/Work/Activity:  independent      Number of Personal Factors/Comorbidities that affect the Plan of Care: 1-2: MODERATE COMPLEXITY   EXAMINATION:   Most Recent Physical Functioning:   Gross Assessment:                  Posture:     Balance:  Sitting: Intact  Standing: Impaired  Standing - Static: Good  Standing - Dynamic : Fair Bed Mobility:  Supine to Sit: Minimum assistance; Additional time  Sit to Supine:  (NT)  Wheelchair Mobility:     Transfers:  Sit to Stand: Contact guard assistance  Stand to Sit: Contact guard assistance  Gait:  Right Side Weight Bearing: As tolerated  Left Side Weight Bearing: Full  Base of Support: Center of gravity altered  Speed/Mei: Slow  Step Length: Right shortened;Left shortened  Gait Abnormalities: Decreased step clearance;Trunk sway increased  Distance (ft): 60 Feet (ft)  Assistive Device: Walker, rolling  Ambulation - Level of Assistance: Stand-by asssistance  Interventions: Verbal cues; Safety awareness training; Tactile cues       Body Structures Involved:  1. Nerves  2. Bones  3. Joints  4. Muscles  5. Ligaments Body Functions Affected:  1. Sensory/Pain  2. Neuromusculoskeletal  3. Movement Related Activities and Participation Affected:  1. Mobility  2. Self Care  3. Domestic Life  4. Interpersonal Interactions and Relationships  5. Community, Social and [place]   Number of elements that affect the Plan of Care: 4+: HIGH COMPLEXITY   CLINICAL PRESENTATION:   Presentation: Stable and uncomplicated: LOW COMPLEXITY   CLINICAL DECISION MAKIN [facility] Inpatient Short Form  How much difficulty does the patient currently have. .. Unable A Lot A Little None   1. Turning over in bed (including adjusting bedclothes, sheets and blankets)? [ ] 1   [ ] 2   [X] 3   [ ] 4   2. Sitting down on and standing up from a chair with arms ( e.g., wheelchair, bedside commode, etc.)   [ ] 1   [ ] 2   [X] 3   [ ] 4   3. Moving from lying on back to sitting on the side of the bed? [ ] 1   [X] 2   [ ] 3   [ ] 4   How much help from another person does the patient currently need. .. Total A Lot A Little None   4. Moving to and from a bed to a chair (including a wheelchair)? [ ] 1   [X] 2   [ ] 3   [ ] 4   5. Need to walk in hospital room? [ ] 1   [X] 2   [ ] 3   [ ] 4   6.   Climbing 3-5 steps with a railing? [ ] 1   [X] 2   [ ] 3   [ ] 4   © 2007, Trustees of [address] [place] [address] [address] [address], under license to [employer]. All rights reserved    Score:  Initial: 14 Most Recent: X (Date: -- )     Interpretation of Tool:  Represents activities that are increasingly more difficult (i.e. Bed mobility, Transfers, Gait). Score 24 23 22-20 19-15 14-10 9-7 6       Modifier CH CI CJ CK CL CM CN         · Mobility - Walking and Moving Around:               - CURRENT STATUS:    CL - 60%-79% impaired, limited or restricted               - GOAL STATUS:           CK - 40%-59% impaired, limited or restricted               - D/C STATUS:                       ---------------To be determined---------------  [place] / Product Type: Medicare /       Medical Necessity:     · Patient demonstrates good rehab potential due to higher previous functional level. Reason for Services/Other Comments:  · Patient continues to require modification of therapeutic interventions to increase complexity of exercises. Use of outcome tool(s) and clinical judgement create a POC that gives a: Clear prediction of patient's progress: LOW COMPLEXITY                 TREATMENT:   (In addition to Assessment/Re-Assessment sessions the following treatments were rendered)   Pre-treatment Symptoms/Complaints:  None  Pain: Initial:   Pain Intensity 1: 0  Post Session:  0/10      Therapeutic Activity: (     minutes): Therapeutic activities including Bed transfers, Chair transfers and Ambulation on level ground to improve mobility, strength, balance and coordination. Required minimal Verbal cues; Safety awareness training; Tactile cues to promote static and dynamic balance in standing. Gait Training ( 10 Minutes):  Gait training to improve and/or restore physical functioning as related to mobility, strength and balance.   Ambulated 60 Feet (ft) with Stand-by asssistance using a Walker, rolling and moderate Verbal cues; Safety awareness training; Tactile cues related to their sequencing, knee positioning, sequencing, walker manipulatoin, weight shifts, UE support on rolling and posture training to promote proper body alignment, promote proper body posture and promote proper body mechanics. Instruction in performance of the above deficits to correct overall gait quality and functional mobility. Group Therapeutic Exercise: (  10 Minutes):  Exercises per grid below to improve mobility, strength, balance and stiffness/soreness. Required minimal visual, verbal, manual and tactile cues to promote proper body alignment, promote proper body posture and promote proper body mechanics. Progressed range, repetitions and complexity of movement as indicated. Date:  8/23/17 Date:   Date:     ACTIVITY/EXERCISE AM PM AM PM AM PM   Ambulation:           Distance  Device  Duration         Seated Heel Raises x15B A        Seated Toe Raises x15B A        Seated Long Arc Quads x15B A        Seated Marching x15B  AA-R, A-L        Seated Hip Abduction x15B  AA-R, A-L                 B = bilateral; AA = active assistive; A = active; P = passive            Braces/Orthotics/Lines/Etc:   · O2 via nasal canula  Treatment/Session Assessment:    · Response to Treatment:  See above  · Interdisciplinary Collaboration:  · Physical Therapy Assistant and Registered Nurse  · After treatment position/precautions:  · Up in chair, Bed alarm/tab alert on, Bed/Chair-wheels locked, Call light within reach, RN notified and Family at bedside  · Compliance with Program/Exercises: Will assess as treatment progresses. · Recommendations/Intent for next treatment session: \"Next visit will focus on advancements to more challenging activities and reduction in assistance provided\".   Total Treatment Duration:  PT Patient Time In/Time Out  Time In: 0909 (1140)  Time Out: 0924 (1150)     Wayna Oppenheim Binkley-Vance, PTA

## 2017-08-23 NOTE — PROGRESS NOTES
Assured patient of the availability of pastoral care during hospitalization  Patient expressed appreciation for  visit    Megan Chakraborty III, PhD  [facility]  [phone]

## 2017-08-23 NOTE — PROGRESS NOTES
Problem: Mobility Impaired (Adult and Pediatric)  Goal: *Acute Goals and Plan of Care (Insert Text)  STG:  (1.)Mr. Blum will move from supine to sit and sit to supine , scoot up and down and roll side to side with CONTACT GUARD ASSIST within 3 day(s). (2.)Mr. Blum will transfer from bed to chair and chair to bed with STAND BY ASSIST using the least restrictive device within 3 day(s). (3.)Mr. Blum will ambulate with STAND BY ASSIST for 50 feet with the least restrictive device within 3 day(s). Goal met 8/23/2017  (4.)Mr. Blum will maintain hip precautions RLE throughout all functional mobility within 3 days with 0 verbal cues. (5.)Mr. Blum will perform LE exercises with 1 to 2 cues for form within 3 days to improve strength for functional transfers and ambulation. LTG:  (1.)Mr. Blum will move from supine to sit and sit to supine , scoot up and down and roll side to side in bed with STAND BY ASSIST within 7 day(s). (2.)Mr. Blum will transfer from bed to chair and chair to bed with SUPERVISION using the least restrictive device within 7 day(s). (3.)Mr. Blum will ambulate with SUPERVISION for 125 feet with the least restrictive device within 7 day(s). PHYSICAL THERAPY: Daily Note, Treatment Day: 1st and PM 8/23/2017  INPATIENT: Hospital Day: 3  [place] / Product Type: Medicare /    WBAT RLE, hip precautions RLE, knee immobilizer when in bed or chair  NAME/AGE/GENDER: Arun Prince is a 69 y.o. male    PRIMARY DIAGNOSIS: Right Hip Fracture   Hip fx ([address] Hip fx ([address] Hip fx ([address]  Procedure(s) (LRB):  Right HIP HEMIARTHROPLASTY (Right)  2 Days Post-Op  ICD-10: Treatment Diagnosis:       · Difficulty in walking, Not elsewhere classified (R26.2)   Precaution/Allergies:  Phenergan [promethazine]       ASSESSMENT:      Mr. De Joe is a 69 y.o. male s/p R hip fracture who is now WBAT s/p above surgery.  Patient was sitting up in recliner chair upon contact and agreeable to PT. Patient able to perform transfer to standing with CGA and cues for improved technique/hand placement. Once standing patient able to perform gait training and increase distance to 90' with use of rolling walker, SBA, and below cues in gait section. Patient returns to EOB and to supine with min assist. Overall good progress towards physical therapy goals as noted by increased gait distance. No additional goals have been met thus far. Will continue efforts. This section established at most recent assessment   PROBLEM LIST (Impairments causing functional limitations):  1. Decreased Strength  2. Decreased ADL/Functional Activities  3. Decreased Transfer Abilities  4. Decreased Ambulation Ability/Technique  5. Decreased Balance  6. Increased Pain  7. Decreased Knowledge of Precautions  8. Decreased [place] with Home Exercise Program    INTERVENTIONS PLANNED: (Benefits and precautions of physical therapy have been discussed with the patient.)  1. Balance Exercise  2. Bed Mobility  3. Family Education  4. Gait Training  5. Home Exercise Program (HEP)  6. Therapeutic Activites  7. Therapeutic Exercise/Strengthening  8. Transfer Training  9. Patient Education  10. Group Therapy      TREATMENT PLAN: Frequency/Duration: twice daily for duration of hospital stay  Rehabilitation Potential For Stated Goals: GOOD      RECOMMENDED REHABILITATION/EQUIPMENT: (at time of discharge pending progress): Continue Skilled Therapy. HISTORY:   History of Present Injury/Illness (Reason for Referral):  Per MD Note: \"Mr. Bladimir Martin is a 68 yo male who presented after falling at home landing on his right hip. Pt reports was working on his car and opened his radiator cap and fell backwards trying to avoid getting burned. Pt found to have a right femoral neck fx. Pt with hx of HTN, CABG X4, bilateral knee replacements, cholecystectomy. Last echo Feb 2016 with EF 50-55%.   Reports hx of difficulty with anesthesia, severe vomiting, difficulty weaning from vent. Pt denies LOC, hitting head, syncope, dizziness, n/v/d, CP, SOB. \"  Past Medical History/Comorbidities:   Mr. Misha Lopez  has a past medical history of Arteriosclerosis of coronary artery bypass graft (11/2/2015); CAD (coronary artery disease); Coronary atherosclerosis of native coronary artery (11/2/2015); Dyspnea/Shortness of breath (11/2/2015); Elevated prostate specific antigen (PSA); Esophageal reflux; Extremity atherosclerosis with intermittent claudication ([facility] [address] (11/2/2015); Heart disease, unspecified; Hyperlipidemia (11/2/2015); Hypertrophy of prostate with urinary obstruction and other lower urinary tract symptoms (LUTS); and Pure hypercholesterolemia. Mr. Misha Lopez  has a past surgical history that includes orthopaedic; cholecystectomy; coronary artery bypass graft; and knee replacement (Left). Social History/Living Environment:   Home Environment: Other (comment) (tri-level )  # Steps to Enter: 4  Rails to Enter: Yes  Hand Rails : Right  One/Two Story Residence: Other (Comment) (tri-level)  # of Interior Steps: 7  Interior Rails: Left  Living Alone: No  Support Systems: Spouse/Significant Other/Partner, Child(benjy)  Patient Expects to be Discharged to[de-identified] Rehabilitation facility  Current DME Used/Available at Home: Shower chair, Walker, rolling  Tub or Shower Type: Shower  Prior Level of Function/Work/Activity:  independent      Number of Personal Factors/Comorbidities that affect the Plan of Care: 1-2: MODERATE COMPLEXITY   EXAMINATION:   Most Recent Physical Functioning:   Gross Assessment:                  Posture:     Balance:  Sitting: Intact  Standing: Impaired  Standing - Static: Good  Standing - Dynamic : Fair Bed Mobility:  Supine to Sit: Minimum assistance; Additional time  Sit to Supine: Minimum assistance  Wheelchair Mobility:     Transfers:  Sit to Stand: Contact guard assistance  Stand to Sit: Contact guard assistance  Gait:  Right Side Weight Bearing: As tolerated  Left Side Weight Bearing: Full  Base of Support: Center of gravity altered  Speed/Mei: Slow  Step Length: Right shortened;Left shortened  Gait Abnormalities: Decreased step clearance;Trunk sway increased  Distance (ft): 90 Feet (ft)  Assistive Device: Walker, rolling  Ambulation - Level of Assistance: Stand-by asssistance  Interventions: Verbal cues; Safety awareness training; Tactile cues       Body Structures Involved:  1. Nerves  2. Bones  3. Joints  4. Muscles  5. Ligaments Body Functions Affected:  1. Sensory/Pain  2. Neuromusculoskeletal  3. Movement Related Activities and Participation Affected:  1. Mobility  2. Self Care  3. Domestic Life  4. Interpersonal Interactions and Relationships  5. Community, Social and [place]   Number of elements that affect the Plan of Care: 4+: HIGH COMPLEXITY   CLINICAL PRESENTATION:   Presentation: Stable and uncomplicated: LOW COMPLEXITY   CLINICAL DECISION MAKIN [facility] Inpatient Short Form  How much difficulty does the patient currently have. .. Unable A Lot A Little None   1. Turning over in bed (including adjusting bedclothes, sheets and blankets)? [ ] 1   [ ] 2   [X] 3   [ ] 4   2. Sitting down on and standing up from a chair with arms ( e.g., wheelchair, bedside commode, etc.)   [ ] 1   [ ] 2   [X] 3   [ ] 4   3. Moving from lying on back to sitting on the side of the bed? [ ] 1   [X] 2   [ ] 3   [ ] 4   How much help from another person does the patient currently need. .. Total A Lot A Little None   4. Moving to and from a bed to a chair (including a wheelchair)? [ ] 1   [X] 2   [ ] 3   [ ] 4   5. Need to walk in hospital room? [ ] 1   [X] 2   [ ] 3   [ ] 4   6. Climbing 3-5 steps with a railing? [ ] 1   [X] 2   [ ] 3   [ ] 4   © , Trustees of [address] [place] [address] [address] [address], under license to [employer].  All rights reserved    Score:  Initial: 14 Most Recent: X (Date: -- ) Interpretation of Tool:  Represents activities that are increasingly more difficult (i.e. Bed mobility, Transfers, Gait). Score 24 23 22-20 19-15 14-10 9-7 6       Modifier CH CI CJ CK CL CM CN         · Mobility - Walking and Moving Around:               - CURRENT STATUS:    CL - 60%-79% impaired, limited or restricted               - GOAL STATUS:           CK - 40%-59% impaired, limited or restricted               - D/C STATUS:                       ---------------To be determined---------------  [place] / Product Type: Medicare /       Medical Necessity:     · Patient demonstrates good rehab potential due to higher previous functional level. Reason for Services/Other Comments:  · Patient continues to require modification of therapeutic interventions to increase complexity of exercises. Use of outcome tool(s) and clinical judgement create a POC that gives a: Clear prediction of patient's progress: LOW COMPLEXITY                 TREATMENT:   (In addition to Assessment/Re-Assessment sessions the following treatments were rendered)   Pre-treatment Symptoms/Complaints:  None  Pain: Initial:   Pain Intensity 1: 0  Post Session:  0/10      Therapeutic Activity: (     minutes): Therapeutic activities including Bed transfers, Chair transfers and Ambulation on level ground to improve mobility, strength, balance and coordination. Required minimal Verbal cues; Safety awareness training; Tactile cues to promote static and dynamic balance in standing. Gait Training ( 10 Minutes):  Gait training to improve and/or restore physical functioning as related to mobility, strength and balance. Ambulated 90 Feet (ft) with Stand-by asssistance using a Walker, rolling and moderate Verbal cues; Safety awareness training; Tactile cues related to their sequencing, knee positioning, sequencing, walker manipulatoin, weight shifts, UE support on rolling and posture training to promote proper body alignment, promote proper body posture and promote proper body mechanics. Instruction in performance of the above deficits to correct overall gait quality and functional mobility. Group Therapeutic Exercise: (   Minutes):  Exercises per grid below to improve mobility, strength, balance and stiffness/soreness. Required minimal visual, verbal, manual and tactile cues to promote proper body alignment, promote proper body posture and promote proper body mechanics. Progressed range, repetitions and complexity of movement as indicated. Date:  8/23/17 Date:   Date:     ACTIVITY/EXERCISE AM PM AM PM AM PM   Ambulation:           Distance  Device  Duration         Seated Heel Raises x15B A        Seated Toe Raises x15B A        Seated Long Arc Quads x15B A        Seated Marching x15B  AA-R, A-L        Seated Hip Abduction x15B  AA-R, A-L                 B = bilateral; AA = active assistive; A = active; P = passive            Braces/Orthotics/Lines/Etc:   · O2 via nasal canula  Treatment/Session Assessment:    · Response to Treatment:  See above  · Interdisciplinary Collaboration:  · Physical Therapy Assistant and Registered Nurse  · After treatment position/precautions:  · Supine in bed, Bed alarm/tab alert on, Bed/Chair-wheels locked, Bed in low position, Call light within reach and RN notified  · Compliance with Program/Exercises: Will assess as treatment progresses. · Recommendations/Intent for next treatment session: \"Next visit will focus on advancements to more challenging activities and reduction in assistance provided\".   Total Treatment Duration:  PT Patient Time In/Time Out  Time In: 1324  Time Out: [facility]

## 2017-08-23 NOTE — PROGRESS NOTES
[facility] FRACTURE PROGRESS NOTE    2017  Admit Date:   2017    Post Op day: 1 Days Post-Op    Subjective:    Morgan Blum PATIENT DOING WELL     PT/OT:   Gait:  Gait  Base of Support: Center of gravity altered, Narrowed  Speed/Mei: Slow, Shuffled, Pace decreased (<100 feet/min)  Step Length: Right shortened  Gait Abnormalities: Decreased step clearance, Antalgic, Path deviations, Trunk sway increased  Ambulation - Level of Assistance: Contact guard assistance  Distance (ft): 15 Feet (ft)  Assistive Device: Walker, rolling  Interventions: Safety awareness training, Verbal cues, Visual/Demos            Interventions: Safety awareness training, Verbal cues, Visual/Demos    Vital Signs:    Patient Vitals for the past 8 hrs:   BP Temp Pulse Resp SpO2   17 0740 99/62 97.9 °F (36.6 °C) 98 16 91 %   17 0604 - - - - 90 %   17 0433 136/62 99.1 °F (37.3 °C) (!) 103 16 90 %     Temp (24hrs), Av.2 °F (36.8 °C), Min:97.7 °F (36.5 °C), Max:99.1 °F (37.3 °C)      Pain Control:   Pain Assessment  Pain Scale 1: Numeric (0 - 10)  Pain Intensity 1: 5  Pain Onset 1: post op  Pain Location 1: Hip  Pain Orientation 1: Right  Pain Description 1: Aching  Pain Intervention(s) 1: Medication (see MAR)    Meds:    Current Facility-Administered Medications   Medication Dose Route Frequency    PPD Read Reminder  1 Each Other Q24H    aspirin chewable tablet 81 mg  81 mg Oral DAILY    acetaminophen (TYLENOL) tablet 650 mg  650 mg Oral Q8H    HYDROmorphone (PF) (DILAUDID) injection 0.5 mg  0.5 mg IntraVENous Q4H PRN    traMADol (ULTRAM) tablet 50 mg  50 mg Oral Q6H PRN    sodium chloride (NS) flush 5-10 mL  5-10 mL IntraVENous Q8H    sodium chloride (NS) flush 5-10 mL  5-10 mL IntraVENous PRN    alum-mag hydroxide-simeth (MYLANTA) oral suspension 30 mL  30 mL Oral Q4H PRN    calcium-vitamin D (OS-CHRISTINA) 500 mg-200 unit tablet  1 Tab Oral TID WITH MEALS    oxyCODONE IR (ROXICODONE) tablet 5 mg  5 mg Oral Q4H PRN    enoxaparin (LOVENOX) injection 30 mg  30 mg SubCUTAneous Q24H    ranolazine ER (RANEXA) tablet 1,000 mg  1,000 mg Oral Q12H    metoprolol succinate (TOPROL-XL) XL tablet 12.5 mg  12.5 mg Oral DAILY    pantoprazole (PROTONIX) tablet 40 mg  40 mg Oral ACB    atorvastatin (LIPITOR) tablet 80 mg  80 mg Oral DAILY    ondansetron (ZOFRAN) injection 4 mg  4 mg IntraVENous Q4H PRN       LAB:    Recent Labs      08/23/17   0631   08/21/17   1200   HCT  32.4*   < >  37.9*   HGB  11.2*   < >  13.6   INR   --    --   1.0    < > = values in this interval not displayed.        24 Hour Assessment Issues:    Oriented    Discharge Planning: SNF    Transfuse PRBC's:      Assessment & Physician's Comment:  Dressing is clean, dry, and intact  Neurovascular checks within normal limits   POSTOP XRAYS REVIEWED    Principal Problem:    Hip fx ([address] (8/21/2017)    Active Problems:    Esophageal reflux ()      Coronary atherosclerosis of native coronary artery (11/2/2015)      Hyperlipidemia (11/2/2015)        Plan:  [facility]      Lakeisha Gamboa MD

## 2017-08-23 NOTE — PROGRESS NOTES
Problem: Self Care Deficits Care Plan (Adult)  Goal: *Acute Goals and Plan of Care (Insert Text)  1. Patient will maintain WBAT status in RLE for 100% of treatment session and no verbal cues from therapist.   2. Patient will complete functional transfers with supervision while maintaining WBAT status in RLE and with adaptive equipment as needed. 3. Patient will complete lower body bathing and dressing with SBA and adaptive equipment as needed. 4. Patient will complete toileting and toilet transfer with SBA. 5. Patient will tolerate 23 minutes of OT treatment with less than 3 rest breaks to increase activity tolerance for ADLs. 6. Patient will participate in at least 23 minutes of BUE therapeutic exercises to strengthen BUE for functional transfers. Timeframe: 7 visits     Comments:       OCCUPATIONAL THERAPY: Daily Note, Treatment Day: 1st and AM 8/23/2017  INPATIENT: Hospital Day: 3  [place] / [place] / Product Type: Medicare /      NAME/AGE/GENDER: Liliane Ruiz is a 69 y.o. male    PRIMARY DIAGNOSIS:  Right Hip Fracture   Hip fx ([address] Hip fx ([address] Hip fx ([address]  Procedure(s) (LRB):  Right HIP HEMIARTHROPLASTY (Right)  2 Days Post-Op  ICD-10: Treatment Diagnosis:        · Pain in Right Hip (M25.551)  · Stiffness of Right Hip, Not elsewhere classified (M25.651)  · Generalized Muscle Weakness (M62.81)  · Other lack of cordination (R27.8)  · History of falling (Z91.81)   Precautions/Allergies:        R WBAT Phenergan [promethazine]       ASSESSMENT:   Mr. Nathanael Parr was admitted for the above diagnosis. Pt was brought to therapy gym to participate in group exercises (in grid below) to increase UE strength and activity tolerance to perform mobility and ADLs. Pt required visual and verbal cueing to complete exercises. Pt tolerated session well. Returned to room by recliner. Will continue to benefit from skilled OT during stay.   This section established at most recent assessment   PROBLEM LIST (Impairments causing functional limitations):  1. Decreased Strength  2. Decreased ADL/Functional Activities  3. Decreased Transfer Abilities  4. Decreased Ambulation Ability/Technique  5. Decreased Balance  6. Increased Pain  7. Decreased Activity Tolerance  8. Decreased Work Simplification/Energy Conservation Techniques  9. Edema/Girth  10. Decreased Knowledge of Precautions    INTERVENTIONS PLANNED: (Benefits and precautions of occupational therapy have been discussed with the patient.)  1. Activities of daily living training  2. Adaptive equipment training  3. Balance training  4. Clothing management  5. Donning&doffing training  6. Group therapy  7. Therapeutic activity  8. Therapeutic exercise      TREATMENT PLAN: Frequency/Duration: Follow patient 6x/week to address above goals. Rehabilitation Potential For Stated Goals: GOOD      RECOMMENDED REHABILITATION/EQUIPMENT: (at time of discharge pending progress): Continue Skilled Therapy. OCCUPATIONAL PROFILE AND HISTORY:   History of Present Injury/Illness (Reason for Referral):  See H&P  Past Medical History/Comorbidities:   Mr. Jen Deleon  has a past medical history of Arteriosclerosis of coronary artery bypass graft (11/2/2015); CAD (coronary artery disease); Coronary atherosclerosis of native coronary artery (11/2/2015); Dyspnea/Shortness of breath (11/2/2015); Elevated prostate specific antigen (PSA); Esophageal reflux; Extremity atherosclerosis with intermittent claudication ([address] (11/2/2015); Heart disease, unspecified; Hyperlipidemia (11/2/2015); Hypertrophy of prostate with urinary obstruction and other lower urinary tract symptoms (LUTS); and Pure hypercholesterolemia. Mr. Jen Deleon  has a past surgical history that includes orthopaedic; cholecystectomy; coronary artery bypass graft; and knee replacement (Left).   Social History/Living Environment:   Home Environment: Other (comment) (tri-level )  # Steps to Enter: 4  Rails to Enter: Yes  Hand Rails : Right  One/Two Story Residence: Other (Comment) (tri-level)  # of Interior Steps: 7  Interior Rails: Left  Living Alone: No  Support Systems: Spouse/Significant Other/Partner, Child(benjy)  Patient Expects to be Discharged to[de-identified] Rehabilitation facility  Current DME Used/Available at Home: Shower chair, Walker, rolling  Tub or Shower Type: Shower  Prior Level of Function/Work/Activity:  Starr with ADLs  Personal Factors:          Sex:  male        Age:  69 y.o. Number of Personal Factors/Comorbidities that affect the Plan of Care: Expanded review of therapy/medical records (1-2):  MODERATE COMPLEXITY   ASSESSMENT OF OCCUPATIONAL PERFORMANCE[de-identified]   Activities of Daily Living:           Basic ADLs (From Assessment) Complex ADLs (From Assessment)   Basic ADL  Feeding: Independent  Oral Facial Hygiene/Grooming: Independent  Bathing: Minimum assistance  Upper Body Dressing: Independent  Lower Body Dressing: Moderate assistance  Toileting: Moderate assistance Instrumental ADL  Meal Preparation: Moderate assistance  Homemaking: Maximum assistance  Medication Management: Independent  Financial Management: Independent   Grooming/Bathing/Dressing Activities of Daily Living     Cognitive Retraining  Safety/Judgement: Awareness of environment                                  Most Recent Physical Functioning:   Gross Assessment:                  Posture:  Posture (WDL): Exceptions to WDL  Posture Assessment:  Forward head, Rounded shoulders  Balance:  Sitting: Intact Bed Mobility:     Wheelchair Mobility:     Transfers:                       Patient Vitals for the past 6 hrs:   BP BP Patient Position SpO2 Pulse   08/23/17 0740 99/62 At rest 91 % 98   08/23/17 1216 95/59 Sitting 92 % 84        Mental Status  Neurologic State: Alert, Appropriate for age  Orientation Level: Oriented X4  Cognition: Appropriate decision making, Follows commands  Perception: Appears intact  Perseveration: No perseveration noted  Safety/Judgement: Awareness of environment                               Physical Skills Involved:  1. Balance  2. Strength  3. Activity Tolerance  4. Gross Motor Control  5. Pain (acute)  6. Edema Cognitive Skills Affected (resulting in the inability to perform in a timely and safe manner): 1. none Psychosocial Skills Affected:  1. Habits/Routines  2. Environmental Adaptation   Number of elements that affect the Plan of Care: 5+:  HIGH COMPLEXITY   CLINICAL DECISION MAKIN[address] [place] [address] AM-PAC 6 Clicks   Daily Activity Inpatient Short Form  How much help from another person does the patient currently need. .. Total A Lot A Little None   1. Putting on and taking off regular lower body clothing?   [ ] 1   [X] 2   [ ] 3   [ ] 4   2. Bathing (including washing, rinsing, drying)? [ ] 1   [ ] 2   [X] 3   [ ] 4   3. Toileting, which includes using toilet, bedpan or urinal?   [ ] 1   [ ] 2   [X] 3   [ ] 4   4. Putting on and taking off regular upper body clothing?   [ ] 1   [ ] 2   [ ] 3   [X] 4   5. Taking care of personal grooming such as brushing teeth? [ ] 1   [ ] 2   [ ] 3   [X] 4   6. Eating meals? [ ] 1   [ ] 2   [ ] 3   [X] 4   © , Trustees of [address] [place] [address], under license to [employer]. All rights reserved    Score:  Initial: 20 Most Recent: X (Date: -- )     Interpretation of Tool:  Represents activities that are increasingly more difficult (i.e. Bed mobility, Transfers, Gait).        Score 24 23 22-20 19-15 14-10 9-7 6       Modifier CH CI CJ CK CL CM CN         · Self Care:               - CURRENT STATUS:    CJ - 20%-39% impaired, limited or restricted               - GOAL STATUS:           CI - 1%-19% impaired, limited or restricted               - D/C STATUS:                       ---------------To be determined---------------  [place] / [place] / Product Type: Medicare /       Medical Necessity:     · Patient is expected to demonstrate progress in strength, balance, coordination and functional technique to increase independence with ADLs. Reason for Services/Other Comments:  · Patient continues to require modification of therapeutic interventions to increase complexity of exercises. Use of outcome tool(s) and clinical judgement create a POC that gives a: LOW COMPLEXITY             TREATMENT:   (In addition to Assessment/Re-Assessment sessions the following treatments were rendered)      Pre-treatment Symptoms/Complaints:    Pain: Initial:   Pain Intensity 1: 0  Post Session:  same      Group Therapeutic Exercise: (10 minutes):  Exercises per grid below to improve mobility, strength and activity tolerance. Required minimal visual and verbal cues to promote proper body alignment and promote proper body mechanics. Progressed resistance and repetitions as indicated. UE Exercises (with green theraband) Date:  8/23/2017 Date:   Date:     Activity/Exercise Parameters Parameters Parameters   Shoulder Abd/Adduction 25 reps     Shoulder Flexion 15 reps     Elbow Flexion 20 reps     Punches 20 reps     Shoulder Shrugs 10 reps                     Braces/Orthotics/Lines/Etc:   · IV  · O2 Device: Room air  Treatment/Session Assessment:    · Response to Treatment:  eval only  · Interdisciplinary Collaboration:  · Certified Occupational Therapy Assistant and Registered Nurse  · After treatment position/precautions:  · Up in chair, Bed alarm/tab alert on, Bed/Chair-wheels locked and Call light within reach  · Compliance with Program/Exercises: compliant all of the time. · Recommendations/Intent for next treatment session: \"Next visit will focus on advancements to more challenging activities and reduction in assistance provided\".   Total Treatment Duration:OT Patient Time In/Time Out  Time In: 1130  Time Out: 1535 Dee Bain

## 2017-08-23 NOTE — PROGRESS NOTES
Hospitalist Progress Note    2017  Admit Date: 2017 10:46 AM   NAME: Ravin Fierro   :  1940   DOS:              17  MRN:  [id number]   Attending: Maggie Gonzalez MD  PCP:  Marty Kayser, MD  Treatment Team: Attending Provider: Shen Galindo MD; Consulting Provider: Mary Cadena MD; Consulting Provider: Marshia Goltz, MD; Care Manager: Mj Quinones [facility] [place]     SUBJECTIVE:   As previously documented: \"  Mr. Darci Trinidad is a 68 yo M with PMHx of CAD s/p CABG x4, HTN, GERD, HLP, BPH, OA s/p b/l TKR who was admitted on 2017 after falling at home landing on his right hip. Mr. Darci Trinidad was working on his car and opened his radiator cap and fell backwards trying to avoid getting burned. ED work-up showed right femoral neck fx. Orthopedics were consulted and he is s/p R hip hemiarthroplasty done 17. \"       17  Mr Ravin Fierro is feeling better. Afebrile. Denies SOB, CP or abdominal pain. Pain well controlled. Notes some desaturations overnight while sleeping, none during the days. No hx of CATARINA/CSA although he complains of excessive daytime sleepiness. 10+ ROS reviewed and negative except for positive in HPI.    Allergies   Allergen Reactions    Phenergan [Promethazine] Unknown (comments)     Current Facility-Administered Medications   Medication Dose Route Frequency    PPD Read Reminder  1 Each Other Q24H    aspirin chewable tablet 81 mg  81 mg Oral DAILY    acetaminophen (TYLENOL) tablet 650 mg  650 mg Oral Q8H    HYDROmorphone (PF) (DILAUDID) injection 0.5 mg  0.5 mg IntraVENous Q4H PRN    traMADol (ULTRAM) tablet 50 mg  50 mg Oral Q6H PRN    sodium chloride (NS) flush 5-10 mL  5-10 mL IntraVENous Q8H    sodium chloride (NS) flush 5-10 mL  5-10 mL IntraVENous PRN    alum-mag hydroxide-simeth (MYLANTA) oral suspension 30 mL  30 mL Oral Q4H PRN    calcium-vitamin D (OS-CHRISTINA) 500 mg-200 unit tablet  1 Tab Oral TID WITH MEALS  oxyCODONE IR (ROXICODONE) tablet 5 mg  5 mg Oral Q4H PRN    enoxaparin (LOVENOX) injection 30 mg  30 mg SubCUTAneous Q24H    ranolazine ER (RANEXA) tablet 1,000 mg  1,000 mg Oral Q12H    metoprolol succinate (TOPROL-XL) XL tablet 12.5 mg  12.5 mg Oral DAILY    pantoprazole (PROTONIX) tablet 40 mg  40 mg Oral ACB    atorvastatin (LIPITOR) tablet 80 mg  80 mg Oral DAILY    ondansetron (ZOFRAN) injection 4 mg  4 mg IntraVENous Q4H PRN         Immunization History   Administered Date(s) Administered    TB Skin Test (PPD) Intradermal 2017     Objective:     Patient Vitals for the past 24 hrs:   Temp Pulse Resp BP SpO2   17 0740 97.9 °F (36.6 °C) 98 16 99/62 91 %   17 0604 - - - - 90 %   17 0433 99.1 °F (37.3 °C) (!) 103 16 136/62 90 %   17 2332 98.4 °F (36.9 °C) 99 18 108/59 90 %   17 1931 97.7 °F (36.5 °C) 95 18 104/53 91 %   17 1401 97.8 °F (36.6 °C) 85 18 127/53 93 %     Temp (24hrs), Av.2 °F (36.8 °C), Min:97.7 °F (36.5 °C), Max:99.1 °F (37.3 °C)    Oxygen Therapy  O2 Sat (%): 91 % (17 0740)  Pulse via Oximetry: 81 beats per minute (17)  O2 Device: Room air (17)  O2 Flow Rate (L/min): 3 l/min (17)  Oxygen Therapy  O2 Sat (%): 91 % (17 0740)  Pulse via Oximetry: 81 beats per minute (17)  O2 Device: Room air (17)  O2 Flow Rate (L/min): 3 l/min (17)    Physical Exam:  General:         Alert, cooperative, no distress . Afebrile. HEENT:               NCAT. No obvious deformity. Nares normal. No drainage  Lungs:  CTABL. No wheezing/rhonchi/rales  Cardiovascular:   RRR. No m/r/g. Trace pitting pedal edema b/l. +2 PT/DT pulses b/l. Abdomen:       S/nt/nd. Bowel sounds normal. .   Skin:           Not Jaundiced  Musculoskeletal: R hip with dressing  Neurologic:    AAOx3. CN II- XII grossly WNL. No gross focal deficit. Moves all extremities. Psychiatric:         Good mood.  Normal affect. DIAGNOSTIC STUDIES      Data Review:   Recent Results (from the past 24 hour(s))   PLEASE READ & DOCUMENT PPD TEST IN 24 HRS    Collection Time: 08/22/17  4:03 PM   Result Value Ref Range    PPD negative Negative    mm Induration 0 mm   CBC WITH AUTOMATED DIFF    Collection Time: 08/23/17  6:31 AM   Result Value Ref Range    WBC 9.0 4.3 - 11.1 K/uL    RBC 3.23 (L) 4.23 - 5.67 M/uL    HGB 11.2 (L) 13.6 - 17.2 g/dL    HCT 32.4 (L) 41.1 - 50.3 %    .3 (H) 79.6 - 97.8 FL    MCH 34.7 (H) 26.1 - 32.9 PG    MCHC 34.6 31.4 - 35.0 g/dL    RDW 13.6 11.9 - 14.6 %    PLATELET 272 440 - 821 K/uL    MPV 10.0 (L) 10.8 - 14.1 FL    DF AUTOMATED      NEUTROPHILS 73 43 - 78 %    LYMPHOCYTES 10 (L) 13 - 44 %    MONOCYTES 15 (H) 4.0 - 12.0 %    EOSINOPHILS 2 0.5 - 7.8 %    BASOPHILS 0 0.0 - 2.0 %    IMMATURE GRANULOCYTES 0.4 0.0 - 5.0 %    ABS. NEUTROPHILS 6.5 1.7 - 8.2 K/UL    ABS. LYMPHOCYTES 0.9 0.5 - 4.6 K/UL    ABS. MONOCYTES 1.3 0.1 - 1.3 K/UL    ABS. EOSINOPHILS 0.2 0.0 - 0.8 K/UL    ABS. BASOPHILS 0.0 0.0 - 0.2 K/UL    ABS. IMM. GRANS. 0.0 0.0 - 0.5 K/UL   METABOLIC PANEL, BASIC    Collection Time: 08/23/17  6:31 AM   Result Value Ref Range    Sodium 139 136 - 145 mmol/L    Potassium 4.2 3.5 - 5.1 mmol/L    Chloride 107 98 - 107 mmol/L    CO2 25 21 - 32 mmol/L    Anion gap 7 7 - 16 mmol/L    Glucose 116 (H) 65 - 100 mg/dL    BUN 16 8 - 23 MG/DL    Creatinine 1.03 0.8 - 1.5 MG/DL    GFR est AA >60 >60 ml/min/1.73m2    GFR est non-AA >60 >60 ml/min/1.73m2    Calcium 8.8 8.3 - 10.4 MG/DL       All Micro Results     Procedure Component Value Units Date/Time    MSSA/MRSA SC BY PCR, NASAL SWAB [[id number]] Collected:  08/21/17 1609    Order Status:  Completed Specimen:  Nares Updated:  08/21/17 1943     Special Requests: NO SPECIAL REQUESTS        Culture result:         SA target not detected.                                  A MRSA NEGATIVE, SA NEGATIVE test result does not preclude MRSA or SA nasal colonization. MSSA/MRSA SC BY PCR, NASAL SWAB [[id number]] Collected:  08/21/17 1245    Order Status:  Canceled Specimen:  Swab           Imaging /Procedures /Studies:    CXR Results  (Last 48 hours)               08/21/17 1134  XR CHEST SNGL V Final result    Narrative:  History: Preoperative evaluation for right hip fracture       Exam: portable chest       Comparison: None       Findings: Median sternotomy wires are present. No focal alveolar infiltrate or   pleural effusion. Linear calcified density seen along the right basilar pleural   margin. No pneumothorax. Impressions: No acute abnormality. Xr Hip Rt W Or Wo Pelv 2-3 Vws  Result Date: 8/21/2017  IMPRESSION: 1. Post surgical changes of the right hip as described above. Labs and Studies from previous 24 hours have been personally reviewed by myself [place] Problems    Diagnosis Date Noted    Hip fx ([facility] [address] 08/21/2017    Coronary atherosclerosis of native coronary artery 11/02/2015    Hyperlipidemia 11/02/2015    Esophageal reflux      Hospital Problems as of 8/23/2017  Date Reviewed: 10/25/2016          Codes Class Noted - Resolved POA    * (Principal)Hip fx ([facility] [address] ICD-10-CM: S72.009A  ICD-9-CM: 820.8  8/21/2017 - Present Yes        Coronary atherosclerosis of native coronary artery ICD-10-CM: I25.10  ICD-9-CM: 414.01  11/2/2015 - Present Yes        Hyperlipidemia ICD-10-CM: E78.5  ICD-9-CM: 272.4  11/2/2015 - Present Yes        Esophageal reflux ICD-10-CM: K21.9  ICD-9-CM: 530.81  Unknown - Present Yes              Plan:  - s/p R hemiarthroplasty 8/21 - uneventful. PRN pain medications per orthopedics  - continue HTN meds- BP well controlled   - will give one dose of Lasix PO . Stop IV fluids  - nocturnal oximetry - will need sleep study in outpatient  - PT/OT/PPD - plan for SNF on tomorrow    DVT Prophylaxis: Lovenox SQ  CODE Status: Full CODE  Plan of Care Discussed with: patient.  Care team.  Medical Risk: high - on IV dilaudid  Disposition: SNF on thursday    Moisés Hogan MD  08/23/17

## 2017-08-23 NOTE — PROGRESS NOTES
Primary Nurse Sharyle Overall, RN and Julius Owusu RN performed a dual skin assessment on this patient No impairment noted  Mariano score is 20

## 2017-08-23 NOTE — PROGRESS NOTES
Problem: Falls - Risk of  Goal: *Absence of Falls  Document Greg Fall Risk and appropriate interventions in the flowsheet.    Outcome: Progressing Towards Goal  Fall Risk Interventions:  Mobility Interventions: Bed/chair exit alarm, Communicate number of staff needed for ambulation/transfer, OT consult for ADLs, Patient to call before getting OOB, PT Consult for assist device competence, PT Consult for mobility concerns, Utilize walker, cane, or other assitive device           Medication Interventions: Bed/chair exit alarm, Evaluate medications/consider consulting pharmacy, Patient to call before getting OOB, Teach patient to arise slowly     Elimination Interventions: Bed/chair exit alarm, Call light in reach, Patient to call for help with toileting needs, Toileting schedule/hourly rounds     History of Falls Interventions: Bed/chair exit alarm, Consult care management for discharge planning, Door open when patient unattended, Evaluate medications/consider consulting pharmacy, Investigate reason for fall, Room close to nurse's station

## 2017-08-24 VITALS
HEART RATE: 84 BPM | DIASTOLIC BLOOD PRESSURE: 61 MMHG | BODY MASS INDEX: 29.55 KG/M2 | RESPIRATION RATE: 18 BRPM | TEMPERATURE: 97.7 F | HEIGHT: 68 IN | SYSTOLIC BLOOD PRESSURE: 115 MMHG | WEIGHT: 195 LBS | OXYGEN SATURATION: 93 %

## 2017-08-24 LAB
ANION GAP SERPL CALC-SCNC: 10 MMOL/L (ref 7–16)
BASOPHILS # BLD: 0 K/UL (ref 0–0.2)
BASOPHILS NFR BLD: 0 % (ref 0–2)
BUN SERPL-MCNC: 17 MG/DL (ref 8–23)
CALCIUM SERPL-MCNC: 8.5 MG/DL (ref 8.3–10.4)
CHLORIDE SERPL-SCNC: 103 MMOL/L (ref 98–107)
CO2 SERPL-SCNC: 26 MMOL/L (ref 21–32)
CREAT SERPL-MCNC: 1.03 MG/DL (ref 0.8–1.5)
DIFFERENTIAL METHOD BLD: ABNORMAL
EOSINOPHIL # BLD: 0.4 K/UL (ref 0–0.8)
EOSINOPHIL NFR BLD: 4 % (ref 0.5–7.8)
ERYTHROCYTE [DISTWIDTH] IN BLOOD BY AUTOMATED COUNT: 13.3 % (ref 11.9–14.6)
GLUCOSE SERPL-MCNC: 89 MG/DL (ref 65–100)
HCT VFR BLD AUTO: 30.2 % (ref 41.1–50.3)
HGB BLD-MCNC: 10.5 G/DL (ref 13.6–17.2)
IMM GRANULOCYTES # BLD: 0.1 K/UL (ref 0–0.5)
IMM GRANULOCYTES NFR BLD: 0.6 % (ref 0–5)
LYMPHOCYTES # BLD: 1.1 K/UL (ref 0.5–4.6)
LYMPHOCYTES NFR BLD: 14 % (ref 13–44)
MCH RBC QN AUTO: 34.9 PG (ref 26.1–32.9)
MCHC RBC AUTO-ENTMCNC: 34.8 G/DL (ref 31.4–35)
MCV RBC AUTO: 100.3 FL (ref 79.6–97.8)
MONOCYTES # BLD: 1.2 K/UL (ref 0.1–1.3)
MONOCYTES NFR BLD: 15 % (ref 4–12)
NEUTS SEG # BLD: 5.3 K/UL (ref 1.7–8.2)
NEUTS SEG NFR BLD: 66 % (ref 43–78)
PLATELET # BLD AUTO: 190 K/UL (ref 150–450)
PMV BLD AUTO: 10.2 FL (ref 10.8–14.1)
POTASSIUM SERPL-SCNC: 3.7 MMOL/L (ref 3.5–5.1)
RBC # BLD AUTO: 3.01 M/UL (ref 4.23–5.67)
SODIUM SERPL-SCNC: 139 MMOL/L (ref 136–145)
WBC # BLD AUTO: 8 K/UL (ref 4.3–11.1)

## 2017-08-24 PROCEDURE — 36415 COLL VENOUS BLD VENIPUNCTURE: CPT | Performed by: ORTHOPAEDIC SURGERY

## 2017-08-24 PROCEDURE — 97535 SELF CARE MNGMENT TRAINING: CPT

## 2017-08-24 PROCEDURE — 74011250637 HC RX REV CODE- 250/637: Performed by: ORTHOPAEDIC SURGERY

## 2017-08-24 PROCEDURE — 74011250637 HC RX REV CODE- 250/637: Performed by: HOSPITALIST

## 2017-08-24 PROCEDURE — 97150 GROUP THERAPEUTIC PROCEDURES: CPT

## 2017-08-24 PROCEDURE — 74011250637 HC RX REV CODE- 250/637: Performed by: NURSE PRACTITIONER

## 2017-08-24 PROCEDURE — 97530 THERAPEUTIC ACTIVITIES: CPT

## 2017-08-24 PROCEDURE — 74011250637 HC RX REV CODE- 250/637: Performed by: EMERGENCY MEDICINE

## 2017-08-24 PROCEDURE — 80048 BASIC METABOLIC PNL TOTAL CA: CPT | Performed by: ORTHOPAEDIC SURGERY

## 2017-08-24 PROCEDURE — 85025 COMPLETE CBC W/AUTO DIFF WBC: CPT | Performed by: ORTHOPAEDIC SURGERY

## 2017-08-24 RX ORDER — FERROUS GLUCONATE 324(37.5)
324 TABLET ORAL
Qty: 30 TAB | Refills: 0 | Status: SHIPPED | OUTPATIENT
Start: 2017-08-24 | End: 2017-10-10 | Stop reason: ALTCHOICE

## 2017-08-24 RX ORDER — FERROUS SULFATE, DRIED 160(50) MG
1 TABLET, EXTENDED RELEASE ORAL
Qty: 90 TAB | Refills: 0 | Status: SHIPPED | OUTPATIENT
Start: 2017-08-24 | End: 2017-10-10 | Stop reason: ALTCHOICE

## 2017-08-24 RX ORDER — OXYCODONE HYDROCHLORIDE 5 MG/1
5 TABLET ORAL
Qty: 12 TAB | Refills: 0 | Status: SHIPPED | OUTPATIENT
Start: 2017-08-24 | End: 2017-10-10 | Stop reason: ALTCHOICE

## 2017-08-24 RX ORDER — ENOXAPARIN SODIUM 100 MG/ML
30 INJECTION SUBCUTANEOUS EVERY 24 HOURS
Qty: 7.2 ML | Refills: 0 | Status: SHIPPED | OUTPATIENT
Start: 2017-08-24 | End: 2017-09-17

## 2017-08-24 RX ORDER — TRAMADOL HYDROCHLORIDE 50 MG/1
50 TABLET ORAL
Qty: 12 TAB | Refills: 0 | Status: SHIPPED | OUTPATIENT
Start: 2017-08-24 | End: 2017-10-10 | Stop reason: ALTCHOICE

## 2017-08-24 RX ADMIN — METOPROLOL SUCCINATE 12.5 MG: 25 TABLET, EXTENDED RELEASE ORAL at 10:15

## 2017-08-24 RX ADMIN — PANTOPRAZOLE SODIUM 40 MG: 40 TABLET, DELAYED RELEASE ORAL at 05:28

## 2017-08-24 RX ADMIN — TRAMADOL HYDROCHLORIDE 50 MG: 50 TABLET, FILM COATED ORAL at 10:14

## 2017-08-24 RX ADMIN — RANOLAZINE 1000 MG: 500 TABLET, FILM COATED, EXTENDED RELEASE ORAL at 10:14

## 2017-08-24 RX ADMIN — CALCIUM CARBONATE 500 MG (1,250 MG)-VITAMIN D3 200 UNIT TABLET 1 TABLET: at 10:14

## 2017-08-24 RX ADMIN — ACETAMINOPHEN 650 MG: 325 TABLET ORAL at 05:28

## 2017-08-24 RX ADMIN — ATORVASTATIN CALCIUM 80 MG: 40 TABLET, FILM COATED ORAL at 10:15

## 2017-08-24 RX ADMIN — Medication 10 ML: at 05:28

## 2017-08-24 RX ADMIN — ASPIRIN 81 MG 81 MG: 81 TABLET ORAL at 10:14

## 2017-08-24 NOTE — PROGRESS NOTES
Problem: Self Care Deficits Care Plan (Adult)  Goal: *Acute Goals and Plan of Care (Insert Text)  1. Patient will maintain WBAT status in RLE for 100% of treatment session and no verbal cues from therapist.   2. Patient will complete functional transfers with supervision while maintaining WBAT status in RLE and with adaptive equipment as needed. 3. Patient will complete lower body bathing and dressing with SBA and adaptive equipment as needed. 4. Patient will complete toileting and toilet transfer with SBA. 5. Patient will tolerate 23 minutes of OT treatment with less than 3 rest breaks to increase activity tolerance for ADLs. 6. Patient will participate in at least 23 minutes of BUE therapeutic exercises to strengthen BUE for functional transfers. Timeframe: 7 visits     Comments:       OCCUPATIONAL THERAPY: Daily Note, Treatment Day: 2nd and AM 8/24/2017  INPATIENT: Hospital Day: 4  [place] / Product Type: Medicare /      NAME/AGE/GENDER: Ravin Fierro is a 69 y.o. male    PRIMARY DIAGNOSIS:  Right Hip Fracture   Hip fx ([address] Hip fx ([facility] [address] Hip fx ([facility] [address]  Procedure(s) (LRB):  Right HIP HEMIARTHROPLASTY (Right)  3 Days Post-Op  ICD-10: Treatment Diagnosis:        · Pain in Right Hip (M25.551)  · Stiffness of Right Hip, Not elsewhere classified (M25.651)  · Generalized Muscle Weakness (M62.81)  · Other lack of cordination (R27.8)  · History of falling (Z91.81)   Precautions/Allergies:        R WBAT Phenergan [promethazine]       ASSESSMENT:   Mr. Darci Trinidad was admitted for the above diagnosis. Pt presents sitting up in chair upon arrival. Pt was introduced to and practiced using adaptive equipment (reacher and sock aid) to assist with LB dressing. Pt was able to demonstrate the ability to don/doff socks using equipment with little assistance. Pt reminded of hip precautions at this time.  Pt was brought to therapy gym to participate in group exercises (in grid below) to increase UE strength and activity tolerance to perform mobility and ADLs. Pt required visual and verbal cueing to complete exercises. Pt tolerated session well. Returned to room by recliner. Pt was discharged to SNF today. This section established at most recent assessment   PROBLEM LIST (Impairments causing functional limitations):  1. Decreased Strength  2. Decreased ADL/Functional Activities  3. Decreased Transfer Abilities  4. Decreased Ambulation Ability/Technique  5. Decreased Balance  6. Increased Pain  7. Decreased Activity Tolerance  8. Decreased Work Simplification/Energy Conservation Techniques  9. Edema/Girth  10. Decreased Knowledge of Precautions    INTERVENTIONS PLANNED: (Benefits and precautions of occupational therapy have been discussed with the patient.)  1. Activities of daily living training  2. Adaptive equipment training  3. Balance training  4. Clothing management  5. Donning&doffing training  6. Group therapy  7. Therapeutic activity  8. Therapeutic exercise      TREATMENT PLAN: Frequency/Duration: Follow patient 6x/week to address above goals. Rehabilitation Potential For Stated Goals: GOOD      RECOMMENDED REHABILITATION/EQUIPMENT: (at time of discharge pending progress): Continue Skilled Therapy. OCCUPATIONAL PROFILE AND HISTORY:   History of Present Injury/Illness (Reason for Referral):  See H&P  Past Medical History/Comorbidities:   Mr. 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 Mr. Crissy Kenyon  has a past surgical history that includes orthopaedic; cholecystectomy; coronary artery bypass graft; and knee replacement (Left). Social History/Living Environment:   Home Environment: Other (comment) (tri-level )  # Steps to Enter: 4  Rails to Enter: Yes  Hand Rails : Right  One/Two Story Residence: Other (Comment) (tri-level)  # of Interior Steps: 7  Interior Rails: Left  Living Alone: No  Support Systems: Spouse/Significant Other/Partner, Child(benjy)  Patient Expects to be Discharged to[de-identified] Rehabilitation facility  Current DME Used/Available at Home: Shower chair, Walker, rolling  Tub or Shower Type: Shower  Prior Level of Function/Work/Activity:  [place] with ADLs  Personal Factors:          Sex:  male        Age:  69 y.o. Number of Personal Factors/Comorbidities that affect the Plan of Care: Expanded review of therapy/medical records (1-2):  MODERATE COMPLEXITY   ASSESSMENT OF OCCUPATIONAL PERFORMANCE[de-identified]   Activities of Daily Living:           Basic ADLs (From Assessment) Complex ADLs (From Assessment)   Basic ADL  Feeding: Independent  Oral Facial Hygiene/Grooming: Independent  Bathing: Minimum assistance  Upper Body Dressing: Independent  Lower Body Dressing: Moderate assistance  Toileting: Moderate assistance Instrumental ADL  Meal Preparation: Moderate assistance  Homemaking: Maximum assistance  Medication Management: Independent  Financial Management: Independent   Grooming/Bathing/Dressing Activities of Daily Living     Cognitive Retraining  Safety/Judgement: Awareness of environment                     Lower Body Dressing Assistance  Socks: Minimum assistance  Leg Crossed Method Used: No  Position Performed: Seated in chair  Cues: Doff; Karma Ramos  Adaptive Equipment Used: Reacher;Sock aid            Most Recent Physical Functioning:   Gross Assessment:                  Posture:  Posture (WDL): Exceptions to WDL  Posture Assessment:  Forward head, Rounded shoulders  Balance:  Sitting: Intact Bed Mobility:     Wheelchair Mobility:     Transfers:                       No data found. Mental Status  Neurologic State: Alert, Appropriate for age  Orientation Level: Oriented X4  Cognition: Follows commands  Perception: Appears intact  Perseveration: No perseveration noted  Safety/Judgement: Awareness of environment                               Physical Skills Involved:  1. Balance  2. Strength  3. Activity Tolerance  4. Gross Motor Control  5. Pain (acute)  6. Edema Cognitive Skills Affected (resulting in the inability to perform in a timely and safe manner): 1. none Psychosocial Skills Affected:  1. Habits/Routines  2. Environmental Adaptation   Number of elements that affect the Plan of Care: 5+:  HIGH COMPLEXITY   CLINICAL DECISION MAKIN[address] [place] [address] AM-PAC 6 Clicks   Daily Activity Inpatient Short Form  How much help from another person does the patient currently need. .. Total A Lot A Little None   1. Putting on and taking off regular lower body clothing?   [ ] 1   [X] 2   [ ] 3   [ ] 4   2. Bathing (including washing, rinsing, drying)? [ ] 1   [ ] 2   [X] 3   [ ] 4   3. Toileting, which includes using toilet, bedpan or urinal?   [ ] 1   [ ] 2   [X] 3   [ ] 4   4. Putting on and taking off regular upper body clothing?   [ ] 1   [ ] 2   [ ] 3   [X] 4   5. Taking care of personal grooming such as brushing teeth? [ ] 1   [ ] 2   [ ] 3   [X] 4   6. Eating meals? [ ] 1   [ ] 2   [ ] 3   [X] 4   © , Trustees of [address] [place] [address], under license to [employer]. All rights reserved    Score:  Initial: 20 Most Recent: X (Date: -- )     Interpretation of Tool:  Represents activities that are increasingly more difficult (i.e. Bed mobility, Transfers, Gait).        Score 24 23 22-20 19-15 14-10 9-7 6       Modifier CH CI CJ CK CL CM CN         · Self Care:               - CURRENT STATUS:    CJ - 20%-39% impaired, limited or restricted  - GOAL STATUS:           CI - 1%-19% impaired, limited or restricted               - D/C STATUS:                       ---------------To be determined---------------  [place] / Product Type: Medicare /       Medical Necessity:     · Patient is expected to demonstrate progress in strength, balance, coordination and functional technique to increase independence with ADLs. Reason for Services/Other Comments:  · Patient continues to require modification of therapeutic interventions to increase complexity of exercises. Use of outcome tool(s) and clinical judgement create a POC that gives a: LOW COMPLEXITY             TREATMENT:   (In addition to Assessment/Re-Assessment sessions the following treatments were rendered)      Pre-treatment Symptoms/Complaints:    Pain: Initial:   Pain Intensity 1: 0  Post Session:  same      Self Care: (11 minutes): Procedure(s) (per grid) utilized to improve and/or restore self-care/home management as related to dressing. Required minimal visual, verbal and tactile cueing to facilitate activities of daily living skills and compensatory activities. Group Therapeutic Exercise: (10 minutes):  Exercises per grid below to improve mobility, strength and activity tolerance. Required minimal visual and verbal cues to promote proper body alignment and promote proper body mechanics. Progressed resistance and repetitions as indicated.     UE Exercises (with green theraband) Date:  8/23/2017 Date:  8/24/2017 Date:     Activity/Exercise Parameters Parameters Parameters   Shoulder Abd/Adduction 25 reps 25 reps    Shoulder Flexion 15 reps 15 reps    Elbow Flexion 20 reps 20 reps    Punches 20 reps 20 reps    Shoulder Shrugs 10 reps                     Braces/Orthotics/Lines/Etc:   · IV  · O2 Device: Room air  Treatment/Session Assessment:    · Response to Treatment:  eval only  · Interdisciplinary Collaboration:  · Certified Occupational Therapy Assistant and Registered Nurse  · After treatment position/precautions:  · Up in chair, Bed/Chair-wheels locked and Call light within reach  · Compliance with Program/Exercises: compliant all of the time. · Recommendations/Intent for next treatment session: \"Next visit will focus on advancements to more challenging activities and reduction in assistance provided\".   Total Treatment Duration:OT Patient Time In/Time Out  Time In: 1041 (6666)  Time Out: 1053 [phone])     Nimphius C Claudeen Duval

## 2017-08-24 NOTE — PROGRESS NOTES
Problem: Mobility Impaired (Adult and Pediatric)  Goal: *Acute Goals and Plan of Care (Insert Text)  STG:  (1.)Mr. Blum will move from supine to sit and sit to supine , scoot up and down and roll side to side with CONTACT GUARD ASSIST within 3 day(s). (2.)Mr. Blum will transfer from bed to chair and chair to bed with STAND BY ASSIST using the least restrictive device within 3 day(s). (3.)Mr. Blum will ambulate with STAND BY ASSIST for 50 feet with the least restrictive device within 3 day(s). Goal met 8/23/2017  (4.)Mr. Blum will maintain hip precautions RLE throughout all functional mobility within 3 days with 0 verbal cues. (5.)Mr. Blum will perform LE exercises with 1 to 2 cues for form within 3 days to improve strength for functional transfers and ambulation. LTG:  (1.)Mr. Blum will move from supine to sit and sit to supine , scoot up and down and roll side to side in bed with STAND BY ASSIST within 7 day(s). (2.)Mr. Blum will transfer from bed to chair and chair to bed with SUPERVISION using the least restrictive device within 7 day(s). (3.)Mr. Blum will ambulate with SUPERVISION for 125 feet with the least restrictive device within 7 day(s). PHYSICAL THERAPY: Daily Note, Treatment Day: 2nd and AM 8/24/2017  INPATIENT: Hospital Day: 4  [place] / Product Type: Medicare /    WBAT RLE, hip precautions RLE, knee immobilizer when in bed or chair  NAME/AGE/GENDER: Toni Francis is a 69 y.o. male    PRIMARY DIAGNOSIS: Right Hip Fracture   Hip fx ([address] Hip fx ([address] Hip fx ([address]  Procedure(s) (LRB):  Right HIP HEMIARTHROPLASTY (Right)  3 Days Post-Op  ICD-10: Treatment Diagnosis:       · Difficulty in walking, Not elsewhere classified (R26.2)   Precaution/Allergies:  Phenergan [promethazine]       ASSESSMENT:      Mr. Alo Marie is a 69 y.o. male s/p R hip fracture who is now WBAT s/p above surgery.  Patient was supine upon contact and agreeable to PT. Patient able to perform bed mobility and transfer to standing with min assist, additional time, and cues for improved technique/hand placement. Once standing patient able to significantly increase distance to 250' with use of rolling walker, SBA, and occasional cues for sequencing with rolling walker. Patient was later rolled to therapy gym to participate in group therapeutic strengthening exercises to improve functional strength for transfers, gait and overall mobility. Patient requires cues and assistance to perform exercises correctly. Overall good progress towards physical therapy goals as noted by increased gait distance. No additional goals have been met thus far. Will continue efforts. This section established at most recent assessment   PROBLEM LIST (Impairments causing functional limitations):  1. Decreased Strength  2. Decreased ADL/Functional Activities  3. Decreased Transfer Abilities  4. Decreased Ambulation Ability/Technique  5. Decreased Balance  6. Increased Pain  7. Decreased Knowledge of Precautions  8. Decreased [place] with Home Exercise Program    INTERVENTIONS PLANNED: (Benefits and precautions of physical therapy have been discussed with the patient.)  1. Balance Exercise  2. Bed Mobility  3. Family Education  4. Gait Training  5. Home Exercise Program (HEP)  6. Therapeutic Activites  7. Therapeutic Exercise/Strengthening  8. Transfer Training  9. Patient Education  10. Group Therapy      TREATMENT PLAN: Frequency/Duration: twice daily for duration of hospital stay  Rehabilitation Potential For Stated Goals: GOOD      RECOMMENDED REHABILITATION/EQUIPMENT: (at time of discharge pending progress): Continue Skilled Therapy. HISTORY:   History of Present Injury/Illness (Reason for Referral):  Per MD Note: \"Mr. Rama Holden is a 68 yo male who presented after falling at home landing on his right hip.   Pt reports was working on his car and opened his radiator cap and fell backwards trying to avoid getting burned. Pt found to have a right femoral neck fx. Pt with hx of HTN, CABG X4, bilateral knee replacements, cholecystectomy. Last echo Feb 2016 with EF 50-55%. Reports hx of difficulty with anesthesia, severe vomiting, difficulty weaning from vent. Pt denies LOC, hitting head, syncope, dizziness, n/v/d, CP, SOB. \"  Past Medical History/Comorbidities:   Mr. 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 Mr. Luana Negron  has a past surgical history that includes orthopaedic; cholecystectomy; coronary artery bypass graft; and knee replacement (Left).   Social History/Living Environment:   Home Environment: Other (comment) (tri-level )  # Steps to Enter: 4  Rails to Enter: Yes  Hand Rails : Right  One/Two Story Residence: Other (Comment) (tri-level)  # of Interior Steps: 7  Interior Rails: Left  Living Alone: No  Support Systems: Spouse/Significant Other/Partner, Child(benjy)  Patient Expects to be Discharged to[de-identified] Rehabilitation facility  Current DME Used/Available at Home: Shower chair, Walker, rolling  Tub or Shower Type: Shower  Prior Level of Function/Work/Activity:  independent      Number of Personal Factors/Comorbidities that affect the Plan of Care: 1-2: MODERATE COMPLEXITY   EXAMINATION:   Most Recent Physical Functioning:   Gross Assessment:                  Posture:     Balance:  Sitting: Intact  Standing: Impaired  Standing - Static: Good  Standing - Dynamic : Fair Bed Mobility:  Supine to Sit: Minimum assistance  Sit to Supine:  (NT)  Wheelchair Mobility:     Transfers:  Sit to Stand: Minimum assistance  Stand to Sit: Contact guard assistance  Gait:  Right Side Weight Bearing: As tolerated  Left Side Weight Bearing: Full  Base of Support: Center of gravity altered  Speed/Mei: Slow  Step Length: Right shortened;Left shortened  Gait Abnormalities: Decreased step clearance;Trunk sway increased; Path deviations  Distance (ft): 250 Feet (ft)  Assistive Device: Walker, rolling  Ambulation - Level of Assistance: Stand-by asssistance  Interventions: Safety awareness training; Tactile cues; Verbal cues       Body Structures Involved:  1. Nerves  2. Bones  3. Joints  4. Muscles  5. Ligaments Body Functions Affected:  1. Sensory/Pain  2. Neuromusculoskeletal  3. Movement Related Activities and Participation Affected:  1. Mobility  2. Self Care  3. Domestic Life  4. Interpersonal Interactions and Relationships  5. Community, Social and [place]   Number of elements that affect the Plan of Care: 4+: HIGH COMPLEXITY   CLINICAL PRESENTATION:   Presentation: Stable and uncomplicated: LOW COMPLEXITY   CLINICAL DECISION MAKIN [facility] Inpatient Short Form  How much difficulty does the patient currently have. .. Unable A Lot A Little None   1. Turning over in bed (including adjusting bedclothes, sheets and blankets)? [ ] 1   [ ] 2   [X] 3   [ ] 4   2. Sitting down on and standing up from a chair with arms ( e.g., wheelchair, bedside commode, etc.)   [ ] 1   [ ] 2   [X] 3   [ ] 4   3. Moving from lying on back to sitting on the side of the bed? [ ] 1   [X] 2   [ ] 3   [ ] 4   How much help from another person does the patient currently need. .. Total A Lot A Little None   4. Moving to and from a bed to a chair (including a wheelchair)? [ ] 1   [X] 2   [ ] 3   [ ] 4   5. Need to walk in hospital room? [ ] 1   [X] 2   [ ] 3   [ ] 4   6. Climbing 3-5 steps with a railing? [ ] 1   [X] 2   [ ] 3   [ ] 4   © 2007, Trustees of [address] [place] [address] [address] [address], under license to [employer]. All rights reserved    Score:  Initial: 14 Most Recent: X (Date: -- )     Interpretation of Tool:  Represents activities that are increasingly more difficult (i.e. Bed mobility, Transfers, Gait). Score 24 23 22-20 19-15 14-10 9-7 6       Modifier CH CI CJ CK CL CM CN         · Mobility - Walking and Moving Around:               - CURRENT STATUS:    CL - 60%-79% impaired, limited or restricted               - GOAL STATUS:           CK - 40%-59% impaired, limited or restricted               - D/C STATUS:                       ---------------To be determined---------------  [place] / Product Type: Medicare /       Medical Necessity:     · Patient demonstrates good rehab potential due to higher previous functional level. Reason for Services/Other Comments:  · Patient continues to require modification of therapeutic interventions to increase complexity of exercises. Use of outcome tool(s) and clinical judgement create a POC that gives a: Clear prediction of patient's progress: LOW COMPLEXITY                 TREATMENT:   (In addition to Assessment/Re-Assessment sessions the following treatments were rendered)   Pre-treatment Symptoms/Complaints:  None  Pain: Initial:   Pain Intensity 1: 0  Post Session:  0/10      Therapeutic Activity: (    15 minutes): Therapeutic activities including bed mobility training, transfer training, ambulation on level ground, scooting, ambulation on level ground, and patient education to improve mobility, strength, balance and coordination. Required minimal Safety awareness training; Tactile cues; Verbal cues to promote static and dynamic balance in standing and promote coordination of bilateral, lower extremity(s).         Gait Training (  Minutes):  Gait training to improve and/or restore physical functioning as related to mobility, strength and balance. Ambulated 250 Feet (ft) with Stand-by asssistance using a Walker, rolling and moderate Safety awareness training; Tactile cues; Verbal cues related to their sequencing, knee positioning, sequencing, walker manipulatoin, weight shifts, UE support on rolling and posture training to promote proper body alignment, promote proper body posture and promote proper body mechanics. Instruction in performance of the above deficits to correct overall gait quality and functional mobility. Group Therapeutic Exercise: (  10 Minutes):  Exercises per grid below to improve mobility, strength, balance and stiffness/soreness. Required minimal visual, verbal, manual and tactile cues to promote proper body alignment, promote proper body posture and promote proper body mechanics. Progressed range, repetitions and complexity of movement as indicated. Date:  8/23/17 Date:  8/24/17 Date:     ACTIVITY/EXERCISE AM PM AM PM AM PM   Ambulation:           Distance  Device  Duration         Seated Heel Raises x15B A  x15B A      Seated Toe Raises x15B A  x15B A      Seated Long Arc Quads x15B A  x15B A      Seated Marching x15B  AA-R, A-L  x15B   AA-R, A-L  Within hip precautions      Seated Hip Abduction x15B  AA-R, A-L  x15B  AA-R, A-L               B = bilateral; AA = active assistive; A = active; P = passive      Braces/Orthotics/Lines/Etc:   · None  Treatment/Session Assessment:    · Response to Treatment:  See above  · Interdisciplinary Collaboration:  · Physical Therapy Assistant and Registered Nurse  · After treatment position/precautions:  · Up in chair, Bed alarm/tab alert on, Bed/Chair-wheels locked, Call light within reach and RN notified  · Compliance with Program/Exercises: Will assess as treatment progresses. · Recommendations/Intent for next treatment session:   \"Next visit will focus on advancements to more challenging activities and reduction in assistance provided\".   Total Treatment Duration:  PT Patient Time In/Time Out  Time In: 1018 (1120)  Time Out: 1033 (1130)     Juan J Velasco, LIV

## 2017-08-24 NOTE — PROGRESS NOTES
Care Management Interventions  Mode of Transport at Discharge: BLS  Transition of Care Consult (CM Consult): Discharge Planning  Discharge Durable Medical Equipment: No  Physical Therapy Consult: Yes  Occupational Therapy Consult: Yes  Speech Therapy Consult: No  Current Support Network: Own Home, Lives with Spouse, Family Lives Nearby  Confirm Follow Up Transport: Family  Plan discussed with Pt/Family/Caregiver: Yes  Freedom of Choice Offered: Yes  Discharge Location  Discharge Placement: Rehab Unit Subacute ([address] [facility])    Pt was medically cleared for discharge today and transferred to [facility]. [address] and Rehab for [facility]. Orders were faxed to the facility prior to discharge and originals were forwarded to facility via [facility] transport staff. SW spoke with pt's spouse, via phone, to notify her of pt's discharge and transport time.

## 2017-08-24 NOTE — DISCHARGE SUMMARY
Hospitalist Discharge Summary   Patient ID:  Josselyn Mirza  [id number]  55 y.o.  1940  Admit date: 8/21/2017 10:46 AM  Discharge date and time: 8/24/2017  Attending: Keri Arce MD  PCP:  Jessica Floyd MD  Treatment Team: Attending Provider: Chi Nava MD; Consulting Provider: Heather Singleton MD; Consulting Provider: Jes Roth MD; Care Manager: Sudarshan Hernandez LMSW  Principal Diagnosis Hip fx [facility])   Principal Problem:    Hip fx ([facility] (8/21/2017)    Active Problems:    Esophageal reflux ()      Coronary atherosclerosis of native coronary artery (11/2/2015)      Hyperlipidemia (11/2/2015)       * Admission Diagnoses: Right Hip Fracture   Hip fx [facility])  * Discharge Diagnoses:    Hospital Problems as of 8/24/2017  Date Reviewed: 10/25/2016          Codes Class Noted - Resolved POA    * (Principal)Hip fx ([facility].) ICD-10-CM: T42.688R  ICD-9-CM: 820.8  8/21/2017 - Present Yes        Coronary atherosclerosis of native coronary artery ICD-10-CM: I25.10  ICD-9-CM: 414.01  11/2/2015 - Present Yes        Hyperlipidemia ICD-10-CM: E78.5  ICD-9-CM: 272.4  11/2/2015 - Present Yes        Esophageal reflux ICD-10-CM: K21.9  ICD-9-CM: 530.81  Unknown - Present Yes                Hospital Course:  Please refer to the admission H&P for details of presentation. In summary, Mr. Josselyn Mirza is a 69 y.o. male with PMHx of CAD s/p CABG x4, HTN, GERD, HLP, BPH, OA s/p b/l TKR who was admitted on 8/21/2017 after falling at home landing on his right hip. Mr. Kezia Lau was working on his car and opened his radiator cap and fell backwards trying to avoid getting burned. ED work-up showed right femoral neck fx. Orthopedics were consulted and he is s/p R hip hemiarthroplasty done 8/21/17. Postoperative his admission was uneventful. Mr. Kezia Lau complains of chronic excessive daytime sleepiness, multiple awakenings at night. No prior Hx of CATARINA/CSA. Nocturnal oximetry with possible CATARINA.  Mr. Kezia Lau was referred to Sleep Medicine at discharge for a sleep study. Mr. Nathanael Parr should continue DVt prophylaxis per Orthopedics management and should follow-up with PCP in 3 days after discharge from SNF, with orthopedics on 9/7/2017 and with Sleep Medicine in 1-2 weeks (will need referral per PCP discretion). Will need f/u with his cardiologist within 1 week. Mr. Nathanael Parr should have 2L O2 supplementation at bedtime until he will have his sleep study. Diagnostic Study/Procedure results summary copied from within [facility] EMR:      Labs: Results:       Chemistry Recent Labs      08/24/17 0515 08/23/17 0631  08/22/17 0515   GLU  89  116*  108*   NA  139  139  142   K  3.7  4.2  4.2   CL  103  107  109*   CO2  26  25  23   BUN  17  16  15   CREA  1.03  1.03  0.98   CA  8.5  8.8  8.1*   AGAP  10  7  10      CBC w/Diff Recent Labs      08/24/17 0515 08/23/17 0631 08/22/17 0515   WBC  8.0  9.0  7.0   RBC  3.01*  3.23*  3.46*   HGB  10.5*  11.2*  12.0*   HCT  30.2*  32.4*  34.7*   PLT  190  192  218   GRANS  66  73  74   LYMPH  14  10*  12*   EOS  4  2  2          Coagulation No results for input(s): PTP, INR, APTT in the last 72 hours. No lab exists for component: INREXT, INREXT                      All Micro Results     Procedure Component Value Units Date/Time    MSSA/MRSA SC BY PCR, NASAL SWAB [[id number]] Collected:  08/21/17 1609    Order Status:  Completed Specimen:  Nares Updated:  08/21/17 1943     Special Requests: NO SPECIAL REQUESTS        Culture result:         SA target not detected. A MRSA NEGATIVE, SA NEGATIVE test result does not preclude MRSA or SA nasal colonization.     MSSA/MRSA SC BY PCR, NASAL SWAB [[id number]] Collected:  08/21/17 1245    Order Status:  Canceled Specimen:  Swab             Imaging /Procedures /Studies:    CXR Results  (Last 48 hours)               08/21/17 1134  XR CHEST SNGL V Final result    Narrative:  History: Preoperative evaluation for right hip fracture       Exam: portable chest       Comparison: None       Findings: Median sternotomy wires are present. No focal alveolar infiltrate or   pleural effusion. Linear calcified density seen along the right basilar pleural   margin. No pneumothorax. Impressions: No acute abnormality. Xr Hip Rt W Or Wo Pelv 2-3 Vws  Result Date: 2017  IMPRESSION: 1. Post surgical changes of the right hip as described above. Xr Hip Rt W Or Wo Pelv 2-3 Vws  Result Date: 2017  IMPRESSION: Basilar right femoral neck fracture. Xr Femur Rt 2 Vs  Result Date: 2017  IMPRESSION: Right femoral neck fracture as described. Discharge Exam:  Patient Vitals for the past 24 hrs:   Temp Pulse Resp BP SpO2   17 0738 97.7 °F (36.5 °C) 84 18 115/61 93 %   17 0331 98 °F (36.7 °C) 86 20 112/59 92 %   17 2346 97.9 °F (36.6 °C) 86 20 107/66 92 %   17 2233 - - - - 94 %   17 2033 98 °F (36.7 °C) 87 - 105/66 91 %   17 1444 97.7 °F (36.5 °C) 78 18 105/57 91 %   17 1343 - 82 18 - 92 %     Temp (24hrs), Av.9 °F (36.6 °C), Min:97.7 °F (36.5 °C), Max:98 °F (36.7 °C)    Oxygen Therapy  O2 Sat (%): 93 % (17 0738)  Pulse via Oximetry: 86 beats per minute (17)  O2 Device: Room air (17)      Intake/Output Summary (Last 24 hours) at 17 1234  Last data filed at 17 2136   Gross per 24 hour   Intake                0 ml   Output             1150 ml   Net            -1150 ml       Physical Exam:  General:         Alert, cooperative, no distress . Afebrile. HEENT:               NCAT. No obvious deformity. Nares normal. No drainage  Lungs:               CTABL. No wheezing/rhonchi/rales. RA  Cardiovascular:   RRR. No m/r/g. No pitting pedal edema b/l. +2 PT/DT pulses b/l. Abdomen:       S/nt/nd. Bowel sounds normal. .   Skin:          No rashes.  Not Jaundiced  Musculoskeletal: R hip with dressing  Neurologic:    AAOx3. CN II- XII grossly WNL. No gross focal deficit. Moves all extremities. Psychiatric:         Good mood. Normal affect. Disposition: SNF  Discharge Condition: stable  Patient Instructions:   Current Discharge Medication List      START taking these medications    Details   calcium-vitamin D (OYSTER SHELL) 500 mg(1,250mg) -200 unit per tablet Take 1 Tab by mouth three (3) times daily (with meals). Qty: 90 Tab, Refills: 0      enoxaparin (LOVENOX) 30 mg/0.3 mL injection 0.3 mL by SubCUTAneous route every twenty-four (24) hours for 24 days. Qty: 7.2 mL, Refills: 0      oxyCODONE IR (ROXICODONE) 5 mg immediate release tablet Take 1 Tab by mouth every six (6) hours as needed. Max Daily Amount: 20 mg.  Qty: 12 Tab, Refills: 0      traMADol (ULTRAM) 50 mg tablet Take 1 Tab by mouth every eight (8) hours as needed. Max Daily Amount: 150 mg.  Qty: 12 Tab, Refills: 0      ferrous gluconate 324 mg (37.5 mg iron) tablet Take 1 Tab by mouth three (3) times daily (with meals). Qty: 30 Tab, Refills: 0         CONTINUE these medications which have NOT CHANGED    Details   Ranolazine (RANEXA) 1,000 mg Tb12 Take 1 Tab by mouth two (2) times a day. Indications: CHRONIC STABLE ANGINA PECTORIS  Qty: 180 Tab, Refills: 3      b complex vitamins tablet Take 1 Tab by mouth daily. metoprolol succinate (TOPROL-XL) 25 mg XL tablet TAKE 1 TABLET BY MOUTH EVERY DAY  Qty: 30 Tab, Refills: 11      pantoprazole (PROTONIX) 40 mg tablet Take 40 mg by mouth daily. folic acid (FOLVITE) 1 mg tablet Take  by mouth daily. atorvastatin (LIPITOR) 80 mg tablet Take 80 mg by mouth daily. aspirin 81 mg chewable tablet Take 81 mg by mouth daily. Activity: PT/OT Eval and Treat  Diet: DIET CARDIAC Regular  Wound Care: As directed      Full Code   Surrogate decision maker: discussed with patient and his wife by phone.  Care team.   Pneumonia and flu vaccine to be administered at discharge per hospital protocol     Follow-up  Follow-up Information     Follow up With Details Comments Contact Info    Michelle Jackson MD On 9/7/2017 @ 9:00am.  PLEASE ARRIVE 30 MINUTES PRIOR TO YOUR SCHEDULED APPOINTMENT TIME.  [address] [place] [address]      Meredith Brian MD Schedule an appointment as soon as possible for a visit in 1 week after discharged from rehab [address]  [address]  John Martinez  [address] [place] [address] Street  They will contact you to notify you of your initial MD appointment. [address] [facility] Steven Martinez, [facility] [address] [place] [address], MD In 1 week  [address]  [address]  [phone]            Time spent to discharge patient 32 minutes  Signed:  Papa Alston MD  8/24/2017

## 2017-08-24 NOTE — PROGRESS NOTES
Problem: Falls - Risk of  Goal: *Absence of Falls  Document Greg Fall Risk and appropriate interventions in the flowsheet.    Outcome: Progressing Towards Goal  Fall Risk Interventions:  Mobility Interventions: Patient to call before getting OOB, Bed/chair exit alarm           Medication Interventions: Patient to call before getting OOB, Bed/chair exit alarm     Elimination Interventions: Bed/chair exit alarm, Patient to call for help with toileting needs     History of Falls Interventions: Bed/chair exit alarm

## 2017-08-24 NOTE — PROGRESS NOTES
TRANSFER - OUT REPORT:    Verbal report given to [employer], Nurse (name) on Vadim Sanchez  being transferred to [facility](unit) for routine progression of care       Report consisted of patients Situation, Background, Assessment and   Recommendations(SBAR). Information from the following report(s) Kardex was reviewed with the receiving nurse. Lines:   Peripheral IV 08/21/17 Left Wrist (Active)   Site Assessment Clean, dry, & intact 8/24/2017  4:53 AM   Phlebitis Assessment 0 8/24/2017  4:53 AM   Infiltration Assessment 0 8/24/2017  4:53 AM   Dressing Status Clean, dry, & intact 8/24/2017  4:53 AM   Dressing Type Transparent 8/24/2017  4:53 AM   Hub Color/Line Status Capped 8/24/2017  4:53 AM   Alcohol Cap Used No 8/21/2017  8:41 PM        Opportunity for questions and clarification was provided.       Patient transported with:   Patient belongings

## 2017-08-25 NOTE — CONSULTS
Geriatric Fracture Consult  Patient: Dee Cabral  YOB: 1940   MRN: [id number]      Consult Date: 8/21/2017     Consulting Physician: Madelin Brown NP FOR HOSPITALIST    Chief Complaint: RIGHT FEMORAL NECK FRACTURE; OSTEOPOROSIS  History of Present Illness: Lane Jaquez is a 69 y.o.  male who is being seen for right hip pain after sustaining a fall while working on his radiator. Onset of symptoms was abrupt with unchanged course since that time. The pain is located in the right hip. Patient describes the pain as continuous and rated as moderate. Pain has been associated with movement. Patient denies other injuries. Review of Systems: A comprehensive review of systems was negative except for that written in the History of Present Illness. ED Presentation Time: < 8 hours  Mechanism of Injury: Fall from standing  Ambulatory Status: Independent  Past Medical History:   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    CAD (coronary artery disease)     Coronary atherosclerosis of native coronary artery 11/2/2015    Dyspnea/Shortness of breath 11/2/2015    Elevated prostate specific antigen (PSA)     Esophageal reflux     Extremity atherosclerosis with intermittent claudication ([address] 11/2/2015    Heart disease, unspecified     Hyperlipidemia 11/2/2015    Hypertrophy of prostate with urinary obstruction and other lower urinary tract symptoms (LUTS)     Pure hypercholesterolemia        Allergies:    Allergies   Allergen Reactions    Phenergan [Promethazine] Unknown (comments)      Past Surgical History:   Past Surgical History:   Procedure Laterality Date    HX CHOLECYSTECTOMY      HX CORONARY ARTERY BYPASS GRAFT      HX KNEE REPLACEMENT Left     total    HX ORTHOPAEDIC      knee surgury      Social History:   Social History     Social History    Marital status:      Spouse name: N/A    Number of children: N/A    Years of education: N/A     Occupational History    Not on file. Social History Main Topics    Smoking status: Former Smoker     Quit date: 1/1/1972    Smokeless tobacco: Not on file    Alcohol use No    Drug use: Not on file    Sexual activity: Not on file     Other Topics Concern    Not on file     Social History Narrative     FAMILY HISTORY - REVIEWED - NO PERTINENT FAMILY HISTORY   Dwelling Status: Alone with Spouse/Significant Other  Current Anticoagulant Medications: DENIES  History of falls: NO  Prior Fractures: DENIES  Osteoporosis Medications: none  Bone Density Tests: UNKNOWN  X-RAYS: Right Displaced Femoral Neck Fracture  Physical Exam:   PATIENT COMPLAINING OF RIGHT HIP PAIN AFTER A FALL AT HOME. FOCUSED MUSCULOSKELETAL EXAM REVEALS DECREASED ROM TO RIGHT LE. THERE IS SHORTENING AND EXTERNAL ROTATION NOTED TO RIGHT LE. PATIENT IS TENDER TO PALPATION OVER RIGHT HIP AND GROIN. PATIENT HAS PAIN WITH LOG ROLLING. N/V INTACT. DENIES OTHER INJURIES.     Assessment / Plan: RIGHT HIP HEMIARTHROPLASTY; CONSULT PT/OT - WBAT RIGHT LE; STR  RISKS AND BENEFITS WERE ADDRESSED WITH PATIENT AND FAMILY  Labs:    Lab Results   Component Value Date/Time    HGB 10.5 08/24/2017 05:15 AM    WBC 8.0 08/24/2017 05:15 AM    INR 1.0 08/21/2017 12:00 PM      Preoperative Clearance: YES by Hospitalist          Signed by: Antonieta Weir NP   Today's Date: August 25, 2017

## 2017-10-02 ENCOUNTER — HOSPITAL ENCOUNTER (EMERGENCY)
Age: 77
Discharge: HOME | End: 2017-10-03
Attending: EMERGENCY MEDICINE
Payer: MEDICARE

## 2017-10-02 DIAGNOSIS — I95.1 ORTHOSTATIC HYPOTENSION: ICD-10-CM

## 2017-10-02 DIAGNOSIS — N39.0 URINARY TRACT INFECTION WITHOUT HEMATURIA, SITE UNSPECIFIED: ICD-10-CM

## 2017-10-02 DIAGNOSIS — R55 NEAR SYNCOPE: Primary | ICD-10-CM

## 2017-10-02 PROCEDURE — 96365 THER/PROPH/DIAG IV INF INIT: CPT | Performed by: EMERGENCY MEDICINE

## 2017-10-02 PROCEDURE — 96361 HYDRATE IV INFUSION ADD-ON: CPT | Performed by: EMERGENCY MEDICINE

## 2017-10-02 PROCEDURE — 99285 EMERGENCY DEPT VISIT HI MDM: CPT | Performed by: EMERGENCY MEDICINE

## 2017-10-02 NOTE — LETTER
10/7/2017 [place] [id number]  [address] Dear  Karina Wilner, You were recently seen in the Emergency Department of [facility] and had blood work or x-rays performed. We would like to discuss these with you. Please call the Emergency Department at your earliest convenience. If you were seen at [facility], please dial [phone], and if you were seen at [facility], please call [phone] to speak with one of our providers. Sincerely, Syndey Naik MD 
Medical Director Emergency Services, [address] [place] [address]  
[phone]/ [phone]

## 2017-10-03 VITALS
SYSTOLIC BLOOD PRESSURE: 108 MMHG | DIASTOLIC BLOOD PRESSURE: 65 MMHG | OXYGEN SATURATION: 97 % | BODY MASS INDEX: 29.55 KG/M2 | TEMPERATURE: 98.3 F | WEIGHT: 195 LBS | HEIGHT: 68 IN | RESPIRATION RATE: 13 BRPM | HEART RATE: 80 BPM

## 2017-10-03 LAB
ALBUMIN SERPL-MCNC: 2.8 G/DL (ref 3.2–4.6)
ALBUMIN/GLOB SERPL: 0.8 {RATIO} (ref 1.2–3.5)
ALP SERPL-CCNC: 134 U/L (ref 50–136)
ALT SERPL-CCNC: 26 U/L (ref 12–65)
ANION GAP SERPL CALC-SCNC: 11 MMOL/L (ref 7–16)
AST SERPL-CCNC: 28 U/L (ref 15–37)
ATRIAL RATE: 170 BPM
BACTERIA URNS QL MICRO: ABNORMAL /HPF
BASOPHILS # BLD: 0 K/UL (ref 0–0.2)
BASOPHILS NFR BLD: 0 % (ref 0–2)
BILIRUB SERPL-MCNC: 0.5 MG/DL (ref 0.2–1.1)
BUN SERPL-MCNC: 21 MG/DL (ref 8–23)
CALCIUM SERPL-MCNC: 8.4 MG/DL (ref 8.3–10.4)
CALCULATED R AXIS, ECG10: 45 DEGREES
CALCULATED T AXIS, ECG11: 58 DEGREES
CASTS URNS QL MICRO: ABNORMAL /LPF
CHLORIDE SERPL-SCNC: 102 MMOL/L (ref 98–107)
CO2 SERPL-SCNC: 22 MMOL/L (ref 21–32)
CREAT SERPL-MCNC: 1.19 MG/DL (ref 0.8–1.5)
DIAGNOSIS, 93000: NORMAL
DIFFERENTIAL METHOD BLD: ABNORMAL
EOSINOPHIL # BLD: 0.2 K/UL (ref 0–0.8)
EOSINOPHIL NFR BLD: 3 % (ref 0.5–7.8)
EPI CELLS #/AREA URNS HPF: 0 /HPF
ERYTHROCYTE [DISTWIDTH] IN BLOOD BY AUTOMATED COUNT: 14.2 % (ref 11.9–14.6)
GLOBULIN SER CALC-MCNC: 3.7 G/DL (ref 2.3–3.5)
GLUCOSE SERPL-MCNC: 121 MG/DL (ref 65–100)
HCT VFR BLD AUTO: 33.3 % (ref 41.1–50.3)
HGB BLD-MCNC: 11.7 G/DL (ref 13.6–17.2)
IMM GRANULOCYTES # BLD: 0 K/UL (ref 0–0.5)
IMM GRANULOCYTES NFR BLD: 0.4 % (ref 0–5)
LYMPHOCYTES # BLD: 1.4 K/UL (ref 0.5–4.6)
LYMPHOCYTES NFR BLD: 18 % (ref 13–44)
MCH RBC QN AUTO: 34 PG (ref 26.1–32.9)
MCHC RBC AUTO-ENTMCNC: 35.1 G/DL (ref 31.4–35)
MCV RBC AUTO: 96.8 FL (ref 79.6–97.8)
MONOCYTES # BLD: 1.1 K/UL (ref 0.1–1.3)
MONOCYTES NFR BLD: 14 % (ref 4–12)
NEUTS SEG # BLD: 4.8 K/UL (ref 1.7–8.2)
NEUTS SEG NFR BLD: 65 % (ref 43–78)
PLATELET # BLD AUTO: 281 K/UL (ref 150–450)
PMV BLD AUTO: 9.1 FL (ref 10.8–14.1)
POTASSIUM SERPL-SCNC: 4.4 MMOL/L (ref 3.5–5.1)
PROT SERPL-MCNC: 6.5 G/DL (ref 6.3–8.2)
Q-T INTERVAL, ECG07: 396 MS
QRS DURATION, ECG06: 102 MS
QTC CALCULATION (BEZET), ECG08: 427 MS
RBC # BLD AUTO: 3.44 M/UL (ref 4.23–5.67)
RBC #/AREA URNS HPF: ABNORMAL /HPF
SODIUM SERPL-SCNC: 135 MMOL/L (ref 136–145)
TROPONIN I BLD-MCNC: 0 NG/ML (ref 0.02–0.05)
VENTRICULAR RATE, ECG03: 70 BPM
WBC # BLD AUTO: 7.5 K/UL (ref 4.3–11.1)
WBC URNS QL MICRO: ABNORMAL /HPF

## 2017-10-03 PROCEDURE — 84484 ASSAY OF TROPONIN QUANT: CPT

## 2017-10-03 PROCEDURE — 74011250636 HC RX REV CODE- 250/636: Performed by: EMERGENCY MEDICINE

## 2017-10-03 PROCEDURE — 81015 MICROSCOPIC EXAM OF URINE: CPT | Performed by: EMERGENCY MEDICINE

## 2017-10-03 PROCEDURE — 96365 THER/PROPH/DIAG IV INF INIT: CPT | Performed by: EMERGENCY MEDICINE

## 2017-10-03 PROCEDURE — 87186 SC STD MICRODIL/AGAR DIL: CPT | Performed by: EMERGENCY MEDICINE

## 2017-10-03 PROCEDURE — 87088 URINE BACTERIA CULTURE: CPT | Performed by: EMERGENCY MEDICINE

## 2017-10-03 PROCEDURE — 96361 HYDRATE IV INFUSION ADD-ON: CPT | Performed by: EMERGENCY MEDICINE

## 2017-10-03 PROCEDURE — 74011000258 HC RX REV CODE- 258: Performed by: EMERGENCY MEDICINE

## 2017-10-03 PROCEDURE — 87086 URINE CULTURE/COLONY COUNT: CPT | Performed by: EMERGENCY MEDICINE

## 2017-10-03 PROCEDURE — 93005 ELECTROCARDIOGRAM TRACING: CPT | Performed by: EMERGENCY MEDICINE

## 2017-10-03 PROCEDURE — 85025 COMPLETE CBC W/AUTO DIFF WBC: CPT | Performed by: EMERGENCY MEDICINE

## 2017-10-03 PROCEDURE — 80053 COMPREHEN METABOLIC PANEL: CPT | Performed by: EMERGENCY MEDICINE

## 2017-10-03 RX ORDER — CEPHALEXIN 500 MG/1
500 CAPSULE ORAL 4 TIMES DAILY
Qty: 28 CAP | Refills: 0 | Status: SHIPPED | OUTPATIENT
Start: 2017-10-03 | End: 2017-10-10 | Stop reason: ALTCHOICE

## 2017-10-03 RX ADMIN — SODIUM CHLORIDE 1000 ML: 900 INJECTION, SOLUTION INTRAVENOUS at 01:42

## 2017-10-03 RX ADMIN — CEFTRIAXONE SODIUM 1 G: 1 INJECTION, POWDER, FOR SOLUTION INTRAMUSCULAR; INTRAVENOUS at 02:47

## 2017-10-03 NOTE — ED TRIAGE NOTES
Pt arrives to this facility via EMS reporting weakness and dizziness that began this evening at approximately 2200 whilst he was laying down. Pt denies dizziness upon standing but reports when he laid down this evening to go to bed, he became \"all woozy\". Pt has a cardiac history including eight stents and a cardiac bypass. Pt recently had hip surgery August 21st. Pt reports feeling \"better\" at this moment in time. Pt alert and oriented x 4. /71.

## 2017-10-03 NOTE — ED NOTES
I have reviewed discharge instructions with the patient. The patient verbalized understanding. Patient left ED via Discharge Method: wheelchair to Home with wife. Opportunity for questions and clarification provided. Patient given 1 scripts.

## 2017-10-03 NOTE — ED PROVIDER NOTES
HPI Comments: Patient presents in her department for evaluation after possible near syncopal episode. Patient states that shortly after urinating he was walking back to his bed and as he laid down in his bed he felt suddenly lightheaded and that his \"head was spinning\" and he felt like he was going to pass out symptoms lasted for a few minutes he then felt like his heart was racing and he began hyperventilating but now his symptoms have resolved and he states he feels back to normal he does have a history of the apparently a syncopal episode in the past does have coronary artery disease and this recent fall with hip fracture. He denied any chest pain or shortness of breath he's had no fever or chills review of systems otherwise negative    Patient is a 69 y.o. male presenting with dizziness. The history is provided by the patient. Dizziness   This is a new problem. The current episode started 1 to 2 hours ago. The problem has been resolved. There was no focality noted. Pertinent negatives include no focal weakness, no loss of sensation, no loss of balance, no slurred speech, no speech difficulty, no memory loss, no movement disorder, no agitation, no visual change, no auditory change, no mental status change, no unresponsiveness and no disorientation. There has been no fever. Pertinent negatives include no shortness of breath, no chest pain, no vomiting, no altered mental status, no confusion, no headaches, no choking, no nausea, no bowel incontinence and no bladder incontinence.  There were no medications administered prior to arrival.        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    CAD (coronary artery disease)     Coronary atherosclerosis of native coronary artery 11/2/2015    Dyspnea/Shortness of breath 11/2/2015    Elevated prostate specific antigen (PSA)     Esophageal reflux     [facility]rem[facility] atherosclerosis with [facility] ([facility] 11/2/2015    Heart disease, unspecified     Hyperlipidemia 11/2/2015    Hypertrophy of prostate with urinary obstruction and other lower urinary tract symptoms (LUTS)     Pure hypercholesterolemia        Past Surgical History:   Procedure Laterality Date    HX CHOLECYSTECTOMY      HX CORONARY ARTERY BYPASS GRAFT      HX KNEE REPLACEMENT Left     total    HX ORTHOPAEDIC      knee surgury         Family History:   Problem Relation Age of Onset    Cancer Mother     Cancer Father     Heart Disease Father     Elevated Lipids Father        Social History     Social History    Marital status:      Spouse name: N/A    Number of children: N/A    Years of education: N/A     Occupational History    Not on file. Social History Main Topics    Smoking status: Former Smoker     Quit date: 1/1/1972    Smokeless tobacco: Not on file    Alcohol use No    Drug use: Not on file    Sexual activity: Not on file     Other Topics Concern    Not on file     Social History Narrative         ALLERGIES: Codeine and Phenergan [promethazine]    Review of Systems   Respiratory: Negative for choking and shortness of breath. Cardiovascular: Negative for chest pain. Gastrointestinal: Negative for bowel incontinence, nausea and vomiting. Genitourinary: Negative for bladder incontinence. Neurological: Positive for dizziness. Negative for focal weakness, speech difficulty, headaches and loss of balance. Psychiatric/Behavioral: Negative for agitation, confusion and memory loss. All other systems reviewed and are negative. Vitals:    10/02/17 2359   BP: 159/66   Pulse: 72   Resp: 18   Temp: 98.2 °F (36.8 °C)   SpO2: 98%   Weight: 88.5 kg (195 lb)   Height: 5' 8\" (1.727 m)            Physical Exam   Constitutional: He is oriented to person, place, and time. He appears well-developed and well-nourished. No distress. HENT:   Head: Normocephalic and atraumatic.    Eyes: Conjunctivae and EOM are normal. Pupils are equal, round, and reactive to light. Neck: Normal range of motion. Neck supple. Cardiovascular: Normal rate, regular rhythm and normal heart sounds. Pulmonary/Chest: Effort normal and breath sounds normal.   Abdominal: Soft. Bowel sounds are normal.   Musculoskeletal: Normal range of motion. He exhibits no edema, tenderness or deformity. Neurological: He is alert and oriented to person, place, and time. Skin: Skin is warm and dry. Psychiatric: He has a normal mood and affect. His behavior is normal.   Nursing note and vitals reviewed.        MDM  Number of Diagnoses or Management Options  Diagnosis management comments: History suspicious for post-micturition near-syncope       Amount and/or Complexity of Data Reviewed  Clinical lab tests: ordered and reviewed  Tests in the radiology section of CPT®: ordered and reviewed  Independent visualization of images, tracings, or specimens: yes (EKG isn't 0007: Sinus rhythm rate of 70 incomplete right bundle branch block and PVC no acute ischemic changes)    Risk of Complications, Morbidity, and/or Mortality  Presenting problems: moderate  Diagnostic procedures: moderate  Management options: moderate    Patient Progress  Patient progress: improved    ED Course       Procedures

## 2017-10-03 NOTE — DISCHARGE INSTRUCTIONS
Orthostatic Hypotension: Care Instructions  Your Care Instructions  Orthostatic hypotension is a quick drop in blood pressure. It happens when you get up from sitting or lying down. You may feel faint, lightheaded, or dizzy. When a person sits up or stands up, the body changes the way it pumps blood. This can slow the flow of blood to the brain for a very short time. And that can make you feel lightheaded. Many medicines can cause this problem, especially in older people. Lack of fluids (dehydration) or illnesses such as diabetes or heart disease also can cause it. Follow-up care is a key part of your treatment and safety. Be sure to make and go to all appointments, and call your doctor if you are having problems. It's also a good idea to know your test results and keep a list of the medicines you take. How can you care for yourself at home? · Tell your doctor about any problems you have with your medicines. · If your doctor prescribes medicine to help prevent a low blood pressure problem, take it exactly as prescribed. Call your doctor if you think you are having a problem with your medicine. · Drink plenty of fluids, enough so that your urine is light yellow or clear like water. Choose water and other caffeine-free clear liquids. If you have kidney, heart, or liver disease and have to limit fluids, talk with your doctor before you increase the amount of fluids you drink. · Limit or avoid alcohol and caffeine. · Get up slowly from bed or after sitting for a long time. If you are in bed, roll to your side and swing your legs over the edge of the bed and onto the floor. Push your body up to a sitting position. Wait for a while before you slowly stand up. If you are dizzy or lightheaded, sit or lie down. When should you call for help? Call 911 anytime you think you may need emergency care. For example, call if:  · You passed out (lost consciousness).   Watch closely for changes in your health, and be sure to contact your doctor if:  · You do not get better as expected. Where can you learn more? Go to http://gemma-emmy.info/. Enter Z802 in the search box to learn more about \"Orthostatic Hypotension: Care Instructions. \"  Current as of: April 3, 2017  Content Version: 11.3  © 2006-2017 [employer]. Care instructions adapted under license by [employer] (which disclaims liability or warranty for this information). If you have questions about a medical condition or this instruction, always ask your healthcare professional. [place] [address] any warranty or liability for your use of this information. Urinary Tract Infections in Men: Care Instructions  Your Care Instructions    A urinary tract infection, or UTI, is a general term for an infection anywhere between the kidneys and the tip of the penis. UTIs can also be a result of a prostate problem. Most cause pain or burning when you urinate. Most UTIs are caused by bacteria and can be cured with antibiotics. It is important to complete your treatment so that the infection does not get worse. Follow-up care is a key part of your treatment and safety. Be sure to make and go to all appointments, and call your doctor if you are having problems. It's also a good idea to know your test results and keep a list of the medicines you take. How can you care for yourself at home? · Take your antibiotics as prescribed. Do not stop taking them just because you feel better. You need to take the full course of antibiotics. · Take your medicines exactly as prescribed. Your doctor may have prescribed a medicine, such as phenazopyridine (Pyridium), to help relieve pain when you urinate. This turns your urine orange. You may stop taking it when your symptoms get better. But be sure to take all of your antibiotics, which treat the infection. · Drink extra water for the next day or two.  This will help make the urine less concentrated and help wash out the bacteria causing the infection. (If you have kidney, heart, or liver disease and have to limit your fluids, talk with your doctor before you increase your fluid intake.)  · Avoid drinks that are carbonated or have caffeine. They can irritate the bladder. · Urinate often. Try to empty your bladder each time. · To relieve pain, take a hot bath or lay a heating pad (set on low) over your lower belly or genital area. Never go to sleep with a heating pad in place. To help prevent UTIs  · Drink plenty of fluids, enough so that your urine is light yellow or clear like water. If you have kidney, heart, or liver disease and have to limit fluids, talk with your doctor before you increase the amount of fluids you drink. · Urinate when you have the urge. Do not hold your urine for a long time. Urinate before you go to sleep. · Keep your penis clean. Catheter care  If you have a drainage tube (catheter) in place, the following steps will help you care for it. · Always wash your hands before and after touching your catheter. · Check the area around the urethra for inflammation or signs of infection. Signs of infection include irritated, swollen, red, or tender skin, or pus around the catheter. · Clean the area around the catheter with soap and water two times a day. Dry with a clean towel afterward. · Do not apply powder or lotion to the skin around the catheter. To empty the urine collection bag  · Wash your hands with soap and water. · Without touching the drain spout, remove the spout from its sleeve at the bottom of the collection bag. Open the valve on the spout. · Let the urine flow out of the bag and into the toilet or a container. Do not let the tubing or drain spout touch anything. · After you empty the bag, clean the end of the drain spout with tissue and water.  Close the valve and put the drain spout back into its sleeve at the bottom of the collection bag.  · Wash your hands with soap and water. When should you call for help? Call your doctor now or seek immediate medical care if:  · Symptoms such as a fever, chills, nausea, or vomiting get worse or happen for the first time. · You have new pain in your back just below your rib cage. This is called flank pain. · There is new blood or pus in your urine. · You are not able to take or keep down your antibiotics. Watch closely for changes in your health, and be sure to contact your doctor if:  · You are not getting better after taking an antibiotic for 2 days. · Your symptoms go away but then come back. Where can you learn more? Go to http://gemma-emmy.info/. Enter R513 in the search box to learn more about \"Urinary Tract Infections in Men: Care Instructions. \"  Current as of: November 28, 2016  Content Version: 11.3  © 0425-0443 [employer]. Care instructions adapted under license by [employer] (which disclaims liability or warranty for this information). If you have questions about a medical condition or this instruction, always ask your healthcare professional. [place] [address] any warranty or liability for your use of this information.

## 2017-10-06 LAB
BACTERIA SPEC CULT: ABNORMAL
SERVICE CMNT-IMP: ABNORMAL

## 2017-10-10 NOTE — PROGRESS NOTES
Spoke with pt's daughter, informed  abx need to be replaced with septra, pt had gone to md office and different abx than soniax but unknown name, she will check with pt's md for need for septra rx

## 2017-11-27 ENCOUNTER — HOSPITAL ENCOUNTER (OUTPATIENT)
Dept: LAB | Age: 77
Discharge: HOME | End: 2017-11-27
Attending: ORTHOPAEDIC SURGERY
Payer: MEDICARE

## 2017-11-27 ENCOUNTER — HOSPITAL ENCOUNTER (OUTPATIENT)
Dept: MAMMOGRAPHY | Age: 77
Discharge: HOME | End: 2017-11-27
Attending: ORTHOPAEDIC SURGERY
Payer: MEDICARE

## 2017-11-27 DIAGNOSIS — M81.0 OSTEOPOROSIS: ICD-10-CM

## 2017-11-27 LAB
CALCIUM SERPL-MCNC: 9.4 MG/DL (ref 8.3–10.4)
PTH-INTACT SERPL-MCNC: 50.9 PG/ML (ref 14–72)

## 2017-11-27 PROCEDURE — 77080 DXA BONE DENSITY AXIAL: CPT

## 2017-11-28 LAB — 25(OH)D3+25(OH)D2 SERPL-MCNC: 20.5 NG/ML (ref 30–100)

## 2018-03-26 ENCOUNTER — HOSPITAL ENCOUNTER (OUTPATIENT)
Dept: LAB | Age: 78
Discharge: HOME | End: 2018-03-26
Attending: ORTHOPAEDIC SURGERY
Payer: MEDICARE

## 2018-03-26 LAB
CALCIUM SERPL-MCNC: 9.4 MG/DL (ref 8.3–10.4)
PTH-INTACT SERPL-MCNC: 29.4 PG/ML (ref 18.5–88)

## 2018-03-26 PROCEDURE — 82306 VITAMIN D 25 HYDROXY: CPT | Performed by: ORTHOPAEDIC SURGERY

## 2018-03-26 PROCEDURE — 83970 ASSAY OF PARATHORMONE: CPT | Performed by: ORTHOPAEDIC SURGERY

## 2018-03-26 PROCEDURE — 36415 COLL VENOUS BLD VENIPUNCTURE: CPT | Performed by: ORTHOPAEDIC SURGERY

## 2018-03-27 LAB — 25(OH)D3+25(OH)D2 SERPL-MCNC: 54 NG/ML (ref 30–100)

## 2018-07-20 PROBLEM — R07.9 CHEST PAIN: Status: ACTIVE | Noted: 2018-07-20

## 2018-07-20 PROBLEM — R06.09 DYSPNEA ON EXERTION: Status: ACTIVE | Noted: 2018-07-20

## 2020-06-19 ENCOUNTER — HOSPITAL ENCOUNTER (INPATIENT)
Age: 80
LOS: 2 days | Discharge: HOME | DRG: 312 | End: 2020-06-21
Attending: EMERGENCY MEDICINE | Admitting: INTERNAL MEDICINE
Payer: MEDICARE

## 2020-06-19 ENCOUNTER — APPOINTMENT (OUTPATIENT)
Dept: GENERAL RADIOLOGY | Age: 80
DRG: 312 | End: 2020-06-19
Attending: EMERGENCY MEDICINE
Payer: MEDICARE

## 2020-06-19 ENCOUNTER — APPOINTMENT (OUTPATIENT)
Dept: CT IMAGING | Age: 80
DRG: 312 | End: 2020-06-19
Attending: EMERGENCY MEDICINE
Payer: MEDICARE

## 2020-06-19 DIAGNOSIS — R65.21 SEPTIC SHOCK (HCC): ICD-10-CM

## 2020-06-19 DIAGNOSIS — J96.01 ACUTE RESPIRATORY FAILURE WITH HYPOXIA (HCC): Primary | ICD-10-CM

## 2020-06-19 DIAGNOSIS — A41.9 SEPTIC SHOCK (HCC): ICD-10-CM

## 2020-06-19 PROBLEM — R41.89 UNRESPONSIVE EPISODE: Status: ACTIVE | Noted: 2020-06-19

## 2020-06-19 LAB
ALBUMIN SERPL-MCNC: 3.2 G/DL (ref 3.2–4.6)
ALBUMIN/GLOB SERPL: 0.8 {RATIO} (ref 1.2–3.5)
ALP SERPL-CCNC: 112 U/L (ref 50–136)
ALT SERPL-CCNC: 31 U/L (ref 12–65)
ANION GAP SERPL CALC-SCNC: 10 MMOL/L (ref 7–16)
APPEARANCE UR: CLEAR
ARTERIAL PATENCY WRIST A: ABNORMAL
AST SERPL-CCNC: 22 U/L (ref 15–37)
BACTERIA URNS QL MICRO: 0 /HPF
BASE DEFICIT BLD-SCNC: 3 MMOL/L
BASOPHILS # BLD: 0 K/UL (ref 0–0.2)
BASOPHILS NFR BLD: 0 % (ref 0–2)
BDY SITE: ABNORMAL
BILIRUB SERPL-MCNC: 0.4 MG/DL (ref 0.2–1.1)
BILIRUB UR QL: NEGATIVE
BUN SERPL-MCNC: 21 MG/DL (ref 8–23)
CALCIUM SERPL-MCNC: 8.9 MG/DL (ref 8.3–10.4)
CASTS URNS QL MICRO: 0 /LPF
CHLORIDE SERPL-SCNC: 108 MMOL/L (ref 98–107)
CO2 BLD-SCNC: 21 MMOL/L
CO2 SERPL-SCNC: 21 MMOL/L (ref 21–32)
COLLECT TIME,HTIME: 1808
COLOR UR: YELLOW
CREAT SERPL-MCNC: 1.48 MG/DL (ref 0.8–1.5)
D DIMER PPP FEU-MCNC: 1.31 UG/ML(FEU)
DIFFERENTIAL METHOD BLD: ABNORMAL
EOSINOPHIL # BLD: 0.1 K/UL (ref 0–0.8)
EOSINOPHIL NFR BLD: 1 % (ref 0.5–7.8)
EPI CELLS #/AREA URNS HPF: 0 /HPF
ERYTHROCYTE [DISTWIDTH] IN BLOOD BY AUTOMATED COUNT: 14.9 % (ref 11.9–14.6)
FLOW RATE ISTAT,IFRATE: 6 L/MIN
GAS FLOW.O2 O2 DELIVERY SYS: ABNORMAL L/MIN
GLOBULIN SER CALC-MCNC: 3.9 G/DL (ref 2.3–3.5)
GLUCOSE SERPL-MCNC: 143 MG/DL (ref 65–100)
GLUCOSE UR STRIP.AUTO-MCNC: NEGATIVE MG/DL
HCO3 BLD-SCNC: 20.2 MMOL/L (ref 22–26)
HCT VFR BLD AUTO: 37.7 % (ref 41.1–50.3)
HGB BLD-MCNC: 12.1 G/DL (ref 13.6–17.2)
HGB UR QL STRIP: ABNORMAL
IMM GRANULOCYTES # BLD AUTO: 0.1 K/UL (ref 0–0.5)
IMM GRANULOCYTES NFR BLD AUTO: 1 % (ref 0–5)
KETONES UR QL STRIP.AUTO: NEGATIVE MG/DL
LACTATE SERPL-SCNC: 6.6 MMOL/L (ref 0.4–2)
LEUKOCYTE ESTERASE UR QL STRIP.AUTO: NEGATIVE
LYMPHOCYTES # BLD: 1.3 K/UL (ref 0.5–4.6)
LYMPHOCYTES NFR BLD: 12 % (ref 13–44)
MAGNESIUM SERPL-MCNC: 2.2 MG/DL (ref 1.8–2.4)
MCH RBC QN AUTO: 30.8 PG (ref 26.1–32.9)
MCHC RBC AUTO-ENTMCNC: 32.1 G/DL (ref 31.4–35)
MCV RBC AUTO: 95.9 FL (ref 79.6–97.8)
MONOCYTES # BLD: 1.1 K/UL (ref 0.1–1.3)
MONOCYTES NFR BLD: 11 % (ref 4–12)
NEUTS SEG # BLD: 7.7 K/UL (ref 1.7–8.2)
NEUTS SEG NFR BLD: 75 % (ref 43–78)
NITRITE UR QL STRIP.AUTO: NEGATIVE
NRBC # BLD: 0 K/UL (ref 0–0.2)
PCO2 BLD: 31 MMHG (ref 35–45)
PH BLD: 7.42 [PH] (ref 7.35–7.45)
PH UR STRIP: 6 [PH] (ref 5–9)
PLATELET # BLD AUTO: 273 K/UL (ref 150–450)
PMV BLD AUTO: 10 FL (ref 9.4–12.3)
PO2 BLD: 117 MMHG (ref 75–100)
POTASSIUM SERPL-SCNC: 4.6 MMOL/L (ref 3.5–5.1)
PROCALCITONIN SERPL-MCNC: <0.05 NG/ML
PROT SERPL-MCNC: 7.1 G/DL (ref 6.3–8.2)
PROT UR STRIP-MCNC: NEGATIVE MG/DL
RBC # BLD AUTO: 3.93 M/UL (ref 4.23–5.6)
RBC #/AREA URNS HPF: ABNORMAL /HPF
SAO2 % BLD: 99 % (ref 95–98)
SERVICE CMNT-IMP: ABNORMAL
SODIUM SERPL-SCNC: 139 MMOL/L (ref 136–145)
SP GR UR REFRACTOMETRY: 1.02 (ref 1–1.02)
SPECIMEN TYPE: ABNORMAL
TROPONIN-HIGH SENSITIVITY: 4.3 PG/ML (ref 0–14)
UROBILINOGEN UR QL STRIP.AUTO: 0.2 EU/DL (ref 0.2–1)
WBC # BLD AUTO: 10.3 K/UL (ref 4.3–11.1)
WBC URNS QL MICRO: 0 /HPF

## 2020-06-19 PROCEDURE — 74011250636 HC RX REV CODE- 250/636: Performed by: EMERGENCY MEDICINE

## 2020-06-19 PROCEDURE — 87205 SMEAR GRAM STAIN: CPT

## 2020-06-19 PROCEDURE — 87040 BLOOD CULTURE FOR BACTERIA: CPT

## 2020-06-19 PROCEDURE — 85379 FIBRIN DEGRADATION QUANT: CPT

## 2020-06-19 PROCEDURE — 82803 BLOOD GASES ANY COMBINATION: CPT

## 2020-06-19 PROCEDURE — 80053 COMPREHEN METABOLIC PANEL: CPT

## 2020-06-19 PROCEDURE — 85025 COMPLETE CBC W/AUTO DIFF WBC: CPT

## 2020-06-19 PROCEDURE — 96365 THER/PROPH/DIAG IV INF INIT: CPT

## 2020-06-19 PROCEDURE — 96367 TX/PROPH/DG ADDL SEQ IV INF: CPT

## 2020-06-19 PROCEDURE — 83605 ASSAY OF LACTIC ACID: CPT

## 2020-06-19 PROCEDURE — 84484 ASSAY OF TROPONIN QUANT: CPT

## 2020-06-19 PROCEDURE — 71260 CT THORAX DX C+: CPT

## 2020-06-19 PROCEDURE — 74011000258 HC RX REV CODE- 258: Performed by: EMERGENCY MEDICINE

## 2020-06-19 PROCEDURE — 65270000029 HC RM PRIVATE

## 2020-06-19 PROCEDURE — 93005 ELECTROCARDIOGRAM TRACING: CPT | Performed by: EMERGENCY MEDICINE

## 2020-06-19 PROCEDURE — 71045 X-RAY EXAM CHEST 1 VIEW: CPT

## 2020-06-19 PROCEDURE — 99285 EMERGENCY DEPT VISIT HI MDM: CPT

## 2020-06-19 PROCEDURE — 81001 URINALYSIS AUTO W/SCOPE: CPT

## 2020-06-19 PROCEDURE — 74011636320 HC RX REV CODE- 636/320: Performed by: EMERGENCY MEDICINE

## 2020-06-19 PROCEDURE — 96366 THER/PROPH/DIAG IV INF ADDON: CPT

## 2020-06-19 PROCEDURE — 83735 ASSAY OF MAGNESIUM: CPT

## 2020-06-19 PROCEDURE — 84145 PROCALCITONIN (PCT): CPT

## 2020-06-19 PROCEDURE — 36600 WITHDRAWAL OF ARTERIAL BLOOD: CPT

## 2020-06-19 RX ORDER — ACETAMINOPHEN 325 MG/1
650 TABLET ORAL
Status: DISCONTINUED | OUTPATIENT
Start: 2020-06-19 | End: 2020-06-21 | Stop reason: HOSPADM

## 2020-06-19 RX ORDER — FENTANYL CITRATE 50 UG/ML
INJECTION, SOLUTION INTRAMUSCULAR; INTRAVENOUS
Status: DISCONTINUED
Start: 2020-06-19 | End: 2020-06-20 | Stop reason: WASHOUT

## 2020-06-19 RX ORDER — ATORVASTATIN CALCIUM 80 MG/1
80 TABLET, FILM COATED ORAL DAILY
Status: DISCONTINUED | OUTPATIENT
Start: 2020-06-20 | End: 2020-06-21 | Stop reason: HOSPADM

## 2020-06-19 RX ORDER — VANCOMYCIN 2 GRAM/500 ML IN 0.9 % SODIUM CHLORIDE INTRAVENOUS
2000 ONCE
Status: COMPLETED | OUTPATIENT
Start: 2020-06-19 | End: 2020-06-19

## 2020-06-19 RX ORDER — HEPARIN SODIUM 5000 [USP'U]/ML
5000 INJECTION, SOLUTION INTRAVENOUS; SUBCUTANEOUS EVERY 8 HOURS
Status: DISCONTINUED | OUTPATIENT
Start: 2020-06-20 | End: 2020-06-21 | Stop reason: HOSPADM

## 2020-06-19 RX ORDER — LIDOCAINE HCL/PF 100 MG/5ML
SYRINGE (ML) INTRAVENOUS
Status: DISCONTINUED
Start: 2020-06-19 | End: 2020-06-20 | Stop reason: WASHOUT

## 2020-06-19 RX ORDER — SODIUM CHLORIDE 0.9 % (FLUSH) 0.9 %
5-40 SYRINGE (ML) INJECTION EVERY 8 HOURS
Status: DISCONTINUED | OUTPATIENT
Start: 2020-06-20 | End: 2020-06-21 | Stop reason: HOSPADM

## 2020-06-19 RX ORDER — ONDANSETRON 2 MG/ML
4 INJECTION INTRAMUSCULAR; INTRAVENOUS
Status: DISCONTINUED | OUTPATIENT
Start: 2020-06-19 | End: 2020-06-21 | Stop reason: HOSPADM

## 2020-06-19 RX ORDER — GUAIFENESIN 100 MG/5ML
81 LIQUID (ML) ORAL DAILY
Status: DISCONTINUED | OUTPATIENT
Start: 2020-06-20 | End: 2020-06-21 | Stop reason: HOSPADM

## 2020-06-19 RX ORDER — PANTOPRAZOLE SODIUM 40 MG/1
40 TABLET, DELAYED RELEASE ORAL DAILY
Status: DISCONTINUED | OUTPATIENT
Start: 2020-06-20 | End: 2020-06-21 | Stop reason: HOSPADM

## 2020-06-19 RX ORDER — SODIUM CHLORIDE 0.9 % (FLUSH) 0.9 %
5-40 SYRINGE (ML) INJECTION AS NEEDED
Status: DISCONTINUED | OUTPATIENT
Start: 2020-06-19 | End: 2020-06-21 | Stop reason: HOSPADM

## 2020-06-19 RX ORDER — SODIUM CHLORIDE 0.9 % (FLUSH) 0.9 %
10 SYRINGE (ML) INJECTION
Status: COMPLETED | OUTPATIENT
Start: 2020-06-19 | End: 2020-06-19

## 2020-06-19 RX ORDER — PROPOFOL 10 MG/ML
INJECTION, EMULSION INTRAVENOUS
Status: DISCONTINUED
Start: 2020-06-19 | End: 2020-06-20 | Stop reason: WASHOUT

## 2020-06-19 RX ORDER — AMOXICILLIN 250 MG
1 CAPSULE ORAL
Status: DISCONTINUED | OUTPATIENT
Start: 2020-06-19 | End: 2020-06-21 | Stop reason: HOSPADM

## 2020-06-19 RX ORDER — NITROGLYCERIN 0.4 MG/1
0.4 TABLET SUBLINGUAL
Status: DISCONTINUED | OUTPATIENT
Start: 2020-06-19 | End: 2020-06-21 | Stop reason: HOSPADM

## 2020-06-19 RX ADMIN — SODIUM CHLORIDE 500 ML: 900 INJECTION, SOLUTION INTRAVENOUS at 19:01

## 2020-06-19 RX ADMIN — VANCOMYCIN HYDROCHLORIDE 2000 MG: 10 INJECTION, POWDER, LYOPHILIZED, FOR SOLUTION INTRAVENOUS at 20:03

## 2020-06-19 RX ADMIN — CEFEPIME HYDROCHLORIDE 2 G: 2 INJECTION, POWDER, FOR SOLUTION INTRAVENOUS at 19:01

## 2020-06-19 RX ADMIN — IOPAMIDOL 100 ML: 755 INJECTION, SOLUTION INTRAVENOUS at 19:44

## 2020-06-19 RX ADMIN — SODIUM CHLORIDE 100 ML: 900 INJECTION, SOLUTION INTRAVENOUS at 19:44

## 2020-06-19 RX ADMIN — Medication 10 ML: at 19:44

## 2020-06-19 NOTE — H&P
Hospitalist Note     Admit Date:  2020  5:23 PM   Name:  Xochitl Mcrae   Age:  79 y.o.  :  1940   MRN:  [id number]   PCP:  Queyn Sapp MD  Treatment Team: Attending Provider: Leandra Brasher MD; Primary Nurse: Wyatt Damon RN    HPI/Subjective:     Xochitl Mcrae is a 79 y.o. male with a history of CAD/MI status post PCI and CABG, hypertension, hyperlipidemia, and GERD presenting with unresponsive episode. Majority of history is provided by the patient's wife. She states that he was at home for about 3-1/2 hours earlier today by himself. His last known normal was 2:30 PM during a phone call where she spoke with the patient herself and noted him to have normal mental status. Prior to that the last time she saw him he was functioning normal, talking, alert and ambulatory. Patient states that at no point today as he had any symptoms of chest pain, shortness of breath, abdominal pain, nausea, vomiting, diarrhea, lightheadedness, dizziness, or focal neurological signs or symptoms. At 430 on returning home, his wife found him slumped over with his head down in the chair. He was unresponsive, sweating profusely, and was noted to have a swollen face and tongue without cyanosis. Patient was drooling and making gurgling sounds. Patient did not respond to tactile stimulus. Wife also state that he had urinated himself and was covered in urine. The wife lowered the patient to the floor and called EMS. By the time EMS arrived and patient was placed on oxygen he began to move his extremities but was not fully awake. En route to the hospital in the ambulance, patient regained consciousness and was alert and oriented and lucid but had no recollection of preceding events. Currently patient has no symptoms or complaints. He has a history of heart disease with PCI x8 and a CABG but has never had a stroke or seizure.   Of note he has had an anaphylactic reaction to Phenergan but has no other known allergic reactions. 10 systems reviewed and negative except as noted in HPI. Past Medical History:   Diagnosis Date    Arteriosclerosis of coronary artery bypass graft 2015    CAD (coronary artery disease)     Coronary atherosclerosis of native coronary artery 2015    Dyspnea/Shortness of breath 2015    Elevated prostate specific antigen (PSA)     Esophageal reflux     Extremity atherosclerosis with intermittent claudication ([address] 2015    Heart disease, unspecified     Hyperlipidemia 2015    Hypertrophy of prostate with urinary obstruction and other lower urinary tract symptoms (LUTS)     Pure hypercholesterolemia       Past Surgical History:   Procedure Laterality Date    HX CHOLECYSTECTOMY      HX CORONARY ARTERY BYPASS GRAFT      HX KNEE REPLACEMENT Left     total    HX ORTHOPAEDIC      knee surgury      Allergies   Allergen Reactions    Phenergan [Promethazine] Anaphylaxis    Codeine Nausea and Vomiting      Social History     Tobacco Use    Smoking status: Former Smoker     Last attempt to quit: 1972     Years since quittin.4    Smokeless tobacco: Never Used   Substance Use Topics    Alcohol use: No      Family History   Problem Relation Age of Onset    Cancer Mother     Cancer Father     Heart Disease Father     Elevated Lipids Father            Immunization History   Administered Date(s) Administered    TB Skin Test (PPD) Intradermal 2017       PTA Medications:  Prior to Admission Medications   Prescriptions Last Dose Informant Patient Reported? Taking? CALCIUM CARBONATE, BULK,   Yes No   Sig: Take  by mouth. aspirin 81 mg chewable tablet   Yes No   Sig: Take 81 mg by mouth daily. atorvastatin (LIPITOR) 80 mg tablet   Yes No   Sig: Take 80 mg by mouth daily. b complex vitamins tablet   Yes No   Sig: Take 1 Tab by mouth daily. coenzyme q10 (ULTRA COQ10) 75 mg cap   Yes No   Sig: Take  by mouth.    nitroglycerin (NITROSTAT) 0.4 mg SL tablet   No No   Si Tab by SubLINGual route every five (5) minutes as needed for Chest Pain. Up to 3 doses. pantoprazole (PROTONIX) 40 mg tablet   Yes No   Sig: Take 40 mg by mouth daily. Facility-Administered Medications: None       Objective:     Patient Vitals for the past 24 hrs:   Temp Pulse Resp BP SpO2   20  82 16 159/78 98 %   20  84 15 166/77 98 %   20 1916  93 17 171/88 98 %   20 1728 98 °F (36.7 °C) (!) 106 16 125/63 98 %     Oxygen Therapy  O2 Sat (%): 98 % (20)  Pulse via Oximetry: 82 beats per minute (20)  O2 Device: Nasal cannula (20)  O2 Flow Rate (L/min): 6 l/min (20)    Estimated body mass index is 31.78 kg/m² as calculated from the following:    Height as of this encounter: 5' 8\" (1.727 m). Weight as of this encounter: 94.8 kg (209 lb). No intake or output data in the 24 hours ending 20    *Note that automatically entered I/Os may not be accurate; dependent on patient compliance with collection and accurate  by assistants. Physical Exam:  General:    No acute distress. Awake. Alert. Eyes:   Normal sclerae, no icterus or injection. PERRL. Extraocular movements intact. HENT:  Normocephalic, atraumatic. Moist mucous membranes. No cervical LAD. CV:   RRR. Normal S1/S2. No m/r/g. Lungs:  CTAB. No wheezing, rhonchi, or rales. No increased work of breathing. Abdomen: Soft, nontender, nondistended. Extremities: Warm and dry. No cyanosis or edema. Neurologic: CN II-XII grossly intact. Sensation grossly intact. Able to move all extremities. Skin:     No rashes or jaundice. Normal coloration  Psych:  Normal mood and affect. I reviewed the labs, imaging, EKGs, telemetry, and other studies done this admission.     Data Review:   Recent Results (from the past 24 hour(s))   CBC WITH AUTOMATED DIFF    Collection Time: 20  5:37 PM   Result Value Ref Range    WBC 10.3 4.3 - 11.1 K/uL    RBC 3.93 (L) 4.23 - 5.6 M/uL    HGB 12.1 (L) 13.6 - 17.2 g/dL    HCT 37.7 (L) 41.1 - 50.3 %    MCV 95.9 79.6 - 97.8 FL    MCH 30.8 26.1 - 32.9 PG    MCHC 32.1 31.4 - 35.0 g/dL    RDW 14.9 (H) 11.9 - 14.6 %    PLATELET 323 282 - 946 K/uL    MPV 10.0 9.4 - 12.3 FL    ABSOLUTE NRBC 0.00 0.0 - 0.2 K/uL    DF AUTOMATED      NEUTROPHILS 75 43 - 78 %    LYMPHOCYTES 12 (L) 13 - 44 %    MONOCYTES 11 4.0 - 12.0 %    EOSINOPHILS 1 0.5 - 7.8 %    BASOPHILS 0 0.0 - 2.0 %    IMMATURE GRANULOCYTES 1 0.0 - 5.0 %    ABS. NEUTROPHILS 7.7 1.7 - 8.2 K/UL    ABS. LYMPHOCYTES 1.3 0.5 - 4.6 K/UL    ABS. MONOCYTES 1.1 0.1 - 1.3 K/UL    ABS. EOSINOPHILS 0.1 0.0 - 0.8 K/UL    ABS. BASOPHILS 0.0 0.0 - 0.2 K/UL    ABS. IMM. GRANS. 0.1 0.0 - 0.5 K/UL   METABOLIC PANEL, COMPREHENSIVE    Collection Time: 06/19/20  5:37 PM   Result Value Ref Range    Sodium 139 136 - 145 mmol/L    Potassium 4.6 3.5 - 5.1 mmol/L    Chloride 108 (H) 98 - 107 mmol/L    CO2 21 21 - 32 mmol/L    Anion gap 10 7 - 16 mmol/L    Glucose 143 (H) 65 - 100 mg/dL    BUN 21 8 - 23 MG/DL    Creatinine 1.48 0.8 - 1.5 MG/DL    GFR est AA 59 (L) >60 ml/min/1.73m2    GFR est non-AA 49 (L) >60 ml/min/1.73m2    Calcium 8.9 8.3 - 10.4 MG/DL    Bilirubin, total 0.4 0.2 - 1.1 MG/DL    ALT (SGPT) 31 12 - 65 U/L    AST (SGOT) 22 15 - 37 U/L    Alk.  phosphatase 112 50 - 136 U/L    Protein, total 7.1 6.3 - 8.2 g/dL    Albumin 3.2 3.2 - 4.6 g/dL    Globulin 3.9 (H) 2.3 - 3.5 g/dL    A-G Ratio 0.8 (L) 1.2 - 3.5     LACTIC ACID    Collection Time: 06/19/20  5:37 PM   Result Value Ref Range    Lactic acid 6.6 (HH) 0.4 - 2.0 MMOL/L   PROCALCITONIN    Collection Time: 06/19/20  5:37 PM   Result Value Ref Range    Procalcitonin <0.05 ng/mL   MAGNESIUM    Collection Time: 06/19/20  5:37 PM   Result Value Ref Range    Magnesium 2.2 1.8 - 2.4 mg/dL   TROPONIN-HIGH SENSITIVITY    Collection Time: 06/19/20  5:37 PM   Result Value Ref Range    Troponin-High Sensitivity 4.3 0 - 14 pg/mL   D DIMER    Collection Time: 06/19/20  5:37 PM   Result Value Ref Range    D DIMER 1.31 (HH) <0.56 ug/ml(FEU)   EKG, 12 LEAD, INITIAL    Collection Time: 06/19/20  5:43 PM   Result Value Ref Range    Ventricular Rate 99 BPM    Atrial Rate 99 BPM    P-R Interval 120 ms    QRS Duration 92 ms    Q-T Interval 334 ms    QTC Calculation (Bezet) 428 ms    Calculated P Axis 24 degrees    Calculated R Axis 14 degrees    Calculated T Axis 34 degrees    Diagnosis       !! AGE AND GENDER SPECIFIC ECG ANALYSIS !!   Normal sinus rhythm  Low voltage QRS  Incomplete right bundle branch block  Cannot rule out Inferior infarct , age undetermined  Abnormal ECG  When compared with ECG of 03-OCT-2017 00:07,  Premature ventricular complexes are no longer Present     POC G3    Collection Time: 06/19/20  6:10 PM   Result Value Ref Range    Device: NASAL CANNULA      pH (POC) 7.421 7.35 - 7.45      pCO2 (POC) 31.0 (L) 35 - 45 MMHG    pO2 (POC) 117 (H) 75 - 100 MMHG    HCO3 (POC) 20.2 (L) 22 - 26 MMOL/L    sO2 (POC) 99 (H) 95 - 98 %    Base deficit (POC) 3 mmol/L    Allens test (POC) NOT APPLICABLE      Site RIGHT BRACHIAL      Specimen type (POC) ARTERIAL      Performed by Kamari     CO2, POC 21 MMOL/L    Flow rate (POC) 6.000 L/min    Critical value read back 00:01     Respiratory comment: PhysicianNotified     COLLECT TIME 1,808     URINALYSIS W/ RFLX MICROSCOPIC    Collection Time: 06/19/20  8:20 PM   Result Value Ref Range    Color YELLOW      Appearance CLEAR      Specific gravity 1.021 1.001 - 1.023      pH (UA) 6.0 5.0 - 9.0      Protein Negative NEG mg/dL    Glucose Negative mg/dL    Ketone Negative NEG mg/dL    Bilirubin Negative NEG      Blood TRACE (A) NEG      Urobilinogen 0.2 0.2 - 1.0 EU/dL    Nitrites Negative NEG      Leukocyte Esterase Negative NEG      WBC 0 0 /hpf    RBC 3-5 0 /hpf    Epithelial cells 0 0 /hpf    Bacteria 0 0 /hpf    Casts 0 0 /lpf       All Micro Results     Procedure Component Value Units Date/Time    CULTURE, BLOOD [[id number]] Collected:  06/19/20 1859    Order Status:  Completed Specimen:  Blood Updated:  06/19/20 1925    CULTURE, BLOOD [[id number]] Collected:  06/19/20 1737    Order Status:  Completed Specimen:  Blood Updated:  06/19/20 1755          No current facility-administered medications for this encounter. Current Outpatient Medications   Medication Sig    nitroglycerin (NITROSTAT) 0.4 mg SL tablet 1 Tab by SubLINGual route every five (5) minutes as needed for Chest Pain. Up to 3 doses.  CALCIUM CARBONATE, BULK, Take  by mouth.  coenzyme q10 (ULTRA COQ10) 75 mg cap Take  by mouth.  b complex vitamins tablet Take 1 Tab by mouth daily.  pantoprazole (PROTONIX) 40 mg tablet Take 40 mg by mouth daily.  atorvastatin (LIPITOR) 80 mg tablet Take 80 mg by mouth daily.  aspirin 81 mg chewable tablet Take 81 mg by mouth daily. Other Studies:  No results found for this visit on 06/19/20. Ct Chest W Cont    Result Date: 6/19/2020  CT CHEST WITH CONTRAST  6/19/2020 7:52 PM HISTORY:  Patient unresponsive after cardiac arrest TECHNIQUE: The patient received 100 mL Isovue-370 nonionic IV contrast and axial images were obtained through the chest. Coronal reformatted images were generated. All CT scans at this facility used dose modulation, interactive reconstruction and/or weight based dosing when appropriate to reduce radiation dose to as low as reasonably achievable. COMPARISON:  Portable chest x-ray same date FINDINGS: There are no filling defects within the main or proximal segmental pulmonary artery suggestive of emboli. The thoracic aorta is well-opacified without dissection. There is no thoracic adenopathy. Changes of a CABG are present. A calcified pleural plaque is present along the diaphragmatic surface on the right side. There is no consolidation, pleural effusions or pulmonary edema. A hiatal hernia is present.  Included portions of the upper abdomen demonstrate normal-appearing adrenal glands. Bone windows demonstrate no aggressive osseous lesions. IMPRESSION: 1. No pulmonary embolism 2. Hiatal hernia. Xr Chest Port    Result Date: 6/19/2020  AP PORTABLE CHEST X-RAY 6/19/2020 5:39 PM HISTORY: shob  shortness of breath COMPARISON: August 21, 2017 FINDINGS:  Median sternotomy wires are present. EKG leads are present. There is no consolidation, pleural effusions or advanced pulmonary edema. A linear density along the right hemidiaphragm appears unchanged. IMPRESSION: No consolidation. Assessment and Plan:     Hospital Problems as of 6/19/2020 Date Reviewed: 5/21/2020          Codes Class Noted - Resolved POA    Unresponsive episode ICD-10-CM: R41.89  ICD-9-CM: 780.09  6/19/2020 - Present Unknown              Xochitl Mcrae is a 79 y.o. male presenting with syncope with unresponsive episode.     Plan:  #Syncope with unresponsive episode  Obtain echocardiogram  Obtain head CT  Consider brain MRI  Continue serial neuro checks  Continue to trend troponin  Monitor on telemetry    #CAD  #Hypertension  #Hyperlipidemia  Continue aspirin  Continue atorvastatin  Continue sublingual nitroglycerin as needed  Monitor on telemetry    #GERD  Continue Protonix    DVT ppx ordered  Code status:  Full  Estimated LOS:  Greater than 2 midnights  Risk:  high    Signed:  Beau Juarez MD

## 2020-06-19 NOTE — ED PROVIDER NOTES
Presents with complaint of shortness of breath. EMS was called out for an unresponsive patient. EMS reports when they got there his O2 sat was in the 76s. They placed him on 6 L of oxygen and his O2 sats improved insert his mental status. He initially was responsive only to pain and breathing 10 times per minute. His GCS was 8. They did not give him any medication and he is much more alert currently. Reports a cough and shortness of breath for the past few days but denies any fever chest pain nausea vomiting diarrhea or other concerns. I spoke with wife, states pt has been well. She states that patient often falls asleep in his chair and has his head leaned over and she thinks he got choked. The history is provided by the patient, the EMS personnel and the spouse. The history is limited by the condition of the patient. Altered mental status    This is a new problem. The current episode started 3 to 5 hours ago. The problem has been rapidly improving. Associated symptoms include somnolence and unresponsiveness. Pertinent negatives include no weakness and no agitation. Mental status baseline is normal.  His past medical history is significant for heart disease.         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    CAD (coronary artery disease)     Coronary atherosclerosis of native coronary artery 11/2/2015    Dyspnea/Shortness of breath 11/2/2015    Elevated prostate specific antigen (PSA)     Esophageal reflux     Extremity atherosclerosis with intermittent claudication ([facility] [address] 11/2/2015    Heart disease, unspecified     Hyperlipidemia 11/2/2015    Hypertrophy of prostate with urinary obstruction and other lower urinary tract symptoms (LUTS)     Pure hypercholesterolemia        Past Surgical History:   Procedure Laterality Date    HX CHOLECYSTECTOMY      HX CORONARY ARTERY BYPASS GRAFT      HX KNEE REPLACEMENT Left     total    HX ORTHOPAEDIC      knee surgury         Family History:   Problem Relation Age of Onset    Cancer Mother     Cancer Father     Heart Disease Father     Elevated Lipids Father        Social History     Socioeconomic History    Marital status:      Spouse name: Not on file    Number of children: Not on file    Years of education: Not on file    Highest education level: Not on file   Occupational History    Not on file   Social Needs    Financial resource strain: Not on file    Food insecurity     Worry: Not on file     Inability: Not on file    Transportation needs     Medical: Not on file     Non-medical: Not on file   Tobacco Use    Smoking status: Former Smoker     Last attempt to quit: 1972     Years since quittin.4    Smokeless tobacco: Never Used   Substance and Sexual Activity    Alcohol use: No    Drug use: Not on file    Sexual activity: Not on file   Lifestyle    Physical activity     Days per week: Not on file     Minutes per session: Not on file    Stress: Not on file   Relationships    Social connections     Talks on phone: Not on file     Gets together: Not on file     Attends Zoroastrian service: Not on file     Active member of club or organization: Not on file     Attends meetings of clubs or organizations: Not on file     Relationship status: Not on file    Intimate partner violence     Fear of current or ex partner: Not on file     Emotionally abused: Not on file     Physically abused: Not on file     Forced sexual activity: Not on file   Other Topics Concern    Not on file   Social History Narrative    Not on file         ALLERGIES: Codeine and Phenergan [promethazine]    Review of Systems   Constitutional: Negative for chills and fever. Respiratory: Positive for cough and shortness of breath. Cardiovascular: Negative for chest pain and leg swelling. Neurological: Negative for weakness. Psychiatric/Behavioral: Negative for agitation.    All other systems reviewed and are negative. Vitals:    06/19/20 1728   BP: 125/63   Pulse: (!) 106   Resp: 16   Temp: 98 °F (36.7 °C)   SpO2: 98%   Weight: 94.8 kg (209 lb)   Height: 5' 8\" (1.727 m)            Physical Exam  Vitals signs and nursing note reviewed. Constitutional:       General: He is in acute distress (Mild dyspnea and tachypnea). Appearance: He is well-developed. He is obese. He is not ill-appearing or diaphoretic. HENT:      Head: Normocephalic and atraumatic. Eyes:      General:         Right eye: No discharge. Left eye: No discharge. Conjunctiva/sclera: Conjunctivae normal.   Neck:      Musculoskeletal: Normal range of motion and neck supple. Cardiovascular:      Rate and Rhythm: Normal rate and regular rhythm. Pulmonary:      Effort: Respiratory distress present. Breath sounds: Normal breath sounds. No wheezing or rales. Abdominal:      General: There is no distension. Palpations: Abdomen is soft. Tenderness: There is no abdominal tenderness. Musculoskeletal: Normal range of motion. Skin:     General: Skin is warm and dry. Capillary Refill: Capillary refill takes less than 2 seconds. Neurological:      Mental Status: He is alert and oriented to person, place, and time. Cranial Nerves: No cranial nerve deficit. Psychiatric:         Mood and Affect: Mood normal.         Behavior: Behavior normal.          MDM  Number of Diagnoses or Management Options  Diagnosis management comments: His PO2 is not as high as would be expected on 6 L if his saturations were normal.  He does appear dyspneic. Pt's lactic 6.6. Given small fluid bolus (has CHF) and abx. Await CT chest to r/o PE. D/w Dr. Kevin Dow to f/u. D/w hospitalist prior to check out for continuity. Will need admission. CRITICAL CARE Documentation:    This patient is critically ill and there is a high probability of of imminent or life threatening deterioration in the patient's condition without immediate management. The nature of the patient's clinical problem is: acute rest failure, septic shock    I have spent 45 minutes in direct patient care, documentation, review of labs/xrays/old records, discussion with Family, Staff, Colleague, Nursing, EMS . The time involved in the performance of separately reportable procedures was not counted toward critical care time.      Petrona Meyer MD; 6/19/2020 @7:10 PM           Amount and/or Complexity of Data Reviewed  Clinical lab tests: ordered and reviewed  Tests in the radiology section of CPT®: ordered and reviewed  Decide to obtain previous medical records or to obtain history from someone other than the patient: yes (EMS)  Obtain history from someone other than the patient: yes (Wife  )  Review and summarize past medical records: yes  Discuss the patient with other providers: yes  Independent visualization of images, tracings, or specimens: yes (nsr no ST elevation, low voltage, rbbb)    Risk of Complications, Morbidity, and/or Mortality  Presenting problems: high  Diagnostic procedures: moderate  Management options: high    Critical Care  Total time providing critical care: 30-74 minutes    Patient Progress  Patient progress: stable         Procedures

## 2020-06-19 NOTE — ED TRIAGE NOTES
Pt arrives via [facility] ems from home. Pt called out for cardiac arrest but family statespt reacted to painful stimuli. Pt was last normal around 1pm today. Pt family states 4:3pm pt was slumped in chair and unresponsive. Per ems initial GCS 8 on scene. 170/90 initial bp hr 113 78-80% RA  Confused en route but on arrival pt a/ox3. bp 140/80 6L NC 98%  EKG ST.   Last set VS for EMS: 140/80 hr 110 98% 6L TEMP 98.9 oral. Dr. Edenilson Glynn at bedside. Pt is alert and talking at this time. Pt states year is 2019. Pt denies pain.

## 2020-06-20 ENCOUNTER — APPOINTMENT (OUTPATIENT)
Dept: CT IMAGING | Age: 80
DRG: 312 | End: 2020-06-20
Attending: INTERNAL MEDICINE
Payer: MEDICARE

## 2020-06-20 LAB
ALBUMIN SERPL-MCNC: 3 G/DL (ref 3.2–4.6)
ALBUMIN/GLOB SERPL: 0.9 {RATIO} (ref 1.2–3.5)
ALP SERPL-CCNC: 97 U/L (ref 50–136)
ALT SERPL-CCNC: 27 U/L (ref 12–65)
ANION GAP SERPL CALC-SCNC: 7 MMOL/L (ref 7–16)
AST SERPL-CCNC: 23 U/L (ref 15–37)
ATRIAL RATE: 99 BPM
BASOPHILS # BLD: 0 K/UL (ref 0–0.2)
BASOPHILS NFR BLD: 0 % (ref 0–2)
BILIRUB SERPL-MCNC: 0.5 MG/DL (ref 0.2–1.1)
BUN SERPL-MCNC: 14 MG/DL (ref 8–23)
CALCIUM SERPL-MCNC: 8.8 MG/DL (ref 8.3–10.4)
CALCULATED P AXIS, ECG09: 24 DEGREES
CALCULATED R AXIS, ECG10: 14 DEGREES
CALCULATED T AXIS, ECG11: 34 DEGREES
CHLORIDE SERPL-SCNC: 109 MMOL/L (ref 98–107)
CK SERPL-CCNC: 281 U/L (ref 21–215)
CO2 SERPL-SCNC: 26 MMOL/L (ref 21–32)
CREAT SERPL-MCNC: 1.05 MG/DL (ref 0.8–1.5)
DIAGNOSIS, 93000: NORMAL
DIFFERENTIAL METHOD BLD: ABNORMAL
EOSINOPHIL # BLD: 0.1 K/UL (ref 0–0.8)
EOSINOPHIL NFR BLD: 1 % (ref 0.5–7.8)
ERYTHROCYTE [DISTWIDTH] IN BLOOD BY AUTOMATED COUNT: 14.9 % (ref 11.9–14.6)
GLOBULIN SER CALC-MCNC: 3.5 G/DL (ref 2.3–3.5)
GLUCOSE SERPL-MCNC: 95 MG/DL (ref 65–100)
HCT VFR BLD AUTO: 35 % (ref 41.1–50.3)
HGB BLD-MCNC: 11.6 G/DL (ref 13.6–17.2)
IMM GRANULOCYTES # BLD AUTO: 0 K/UL (ref 0–0.5)
IMM GRANULOCYTES NFR BLD AUTO: 0 % (ref 0–5)
LYMPHOCYTES # BLD: 1.4 K/UL (ref 0.5–4.6)
LYMPHOCYTES NFR BLD: 14 % (ref 13–44)
MAGNESIUM SERPL-MCNC: 2.2 MG/DL (ref 1.8–2.4)
MCH RBC QN AUTO: 31 PG (ref 26.1–32.9)
MCHC RBC AUTO-ENTMCNC: 33.1 G/DL (ref 31.4–35)
MCV RBC AUTO: 93.6 FL (ref 79.6–97.8)
MONOCYTES # BLD: 1.1 K/UL (ref 0.1–1.3)
MONOCYTES NFR BLD: 11 % (ref 4–12)
NEUTS SEG # BLD: 7 K/UL (ref 1.7–8.2)
NEUTS SEG NFR BLD: 73 % (ref 43–78)
NRBC # BLD: 0 K/UL (ref 0–0.2)
P-R INTERVAL, ECG05: 120 MS
PHOSPHATE SERPL-MCNC: 3.3 MG/DL (ref 2.3–3.7)
PLATELET # BLD AUTO: 237 K/UL (ref 150–450)
PMV BLD AUTO: 9.8 FL (ref 9.4–12.3)
POTASSIUM SERPL-SCNC: 3.9 MMOL/L (ref 3.5–5.1)
PROT SERPL-MCNC: 6.5 G/DL (ref 6.3–8.2)
Q-T INTERVAL, ECG07: 334 MS
QRS DURATION, ECG06: 92 MS
QTC CALCULATION (BEZET), ECG08: 428 MS
RBC # BLD AUTO: 3.74 M/UL (ref 4.23–5.6)
SODIUM SERPL-SCNC: 142 MMOL/L (ref 136–145)
T4 FREE SERPL-MCNC: 1.2 NG/DL (ref 0.9–1.8)
TROPONIN-HIGH SENSITIVITY: 9.5 PG/ML (ref 0–14)
TROPONIN-HIGH SENSITIVITY: 9.7 PG/ML (ref 0–14)
TSH SERPL DL<=0.005 MIU/L-ACNC: 0.61 UIU/ML (ref 0.36–3.74)
VENTRICULAR RATE, ECG03: 99 BPM
WBC # BLD AUTO: 9.6 K/UL (ref 4.3–11.1)

## 2020-06-20 PROCEDURE — 83735 ASSAY OF MAGNESIUM: CPT

## 2020-06-20 PROCEDURE — C8929 TTE W OR WO FOL WCON,DOPPLER: HCPCS

## 2020-06-20 PROCEDURE — 74011250636 HC RX REV CODE- 250/636: Performed by: INTERNAL MEDICINE

## 2020-06-20 PROCEDURE — 82550 ASSAY OF CK (CPK): CPT

## 2020-06-20 PROCEDURE — 74011000250 HC RX REV CODE- 250: Performed by: INTERNAL MEDICINE

## 2020-06-20 PROCEDURE — 74011250637 HC RX REV CODE- 250/637: Performed by: INTERNAL MEDICINE

## 2020-06-20 PROCEDURE — 65270000029 HC RM PRIVATE

## 2020-06-20 PROCEDURE — 84484 ASSAY OF TROPONIN QUANT: CPT

## 2020-06-20 PROCEDURE — 85025 COMPLETE CBC W/AUTO DIFF WBC: CPT

## 2020-06-20 PROCEDURE — 36415 COLL VENOUS BLD VENIPUNCTURE: CPT

## 2020-06-20 PROCEDURE — 84100 ASSAY OF PHOSPHORUS: CPT

## 2020-06-20 PROCEDURE — 84439 ASSAY OF FREE THYROXINE: CPT

## 2020-06-20 PROCEDURE — 70450 CT HEAD/BRAIN W/O DYE: CPT

## 2020-06-20 PROCEDURE — 80053 COMPREHEN METABOLIC PANEL: CPT

## 2020-06-20 PROCEDURE — 84443 ASSAY THYROID STIM HORMONE: CPT

## 2020-06-20 RX ORDER — SODIUM CHLORIDE 9 MG/ML
100 INJECTION, SOLUTION INTRAVENOUS CONTINUOUS
Status: DISCONTINUED | OUTPATIENT
Start: 2020-06-20 | End: 2020-06-21 | Stop reason: HOSPADM

## 2020-06-20 RX ADMIN — HEPARIN SODIUM 5000 UNITS: 5000 INJECTION INTRAVENOUS; SUBCUTANEOUS at 05:11

## 2020-06-20 RX ADMIN — ASPIRIN 81 MG 81 MG: 81 TABLET ORAL at 08:13

## 2020-06-20 RX ADMIN — PANTOPRAZOLE SODIUM 40 MG: 40 TABLET, DELAYED RELEASE ORAL at 08:13

## 2020-06-20 RX ADMIN — ATORVASTATIN CALCIUM 80 MG: 80 TABLET, FILM COATED ORAL at 08:13

## 2020-06-20 RX ADMIN — Medication 10 ML: at 00:00

## 2020-06-20 RX ADMIN — Medication 5 ML: at 05:11

## 2020-06-20 RX ADMIN — PERFLUTREN 1 ML: 6.52 INJECTION, SUSPENSION INTRAVENOUS at 17:58

## 2020-06-20 RX ADMIN — HEPARIN SODIUM 5000 UNITS: 5000 INJECTION INTRAVENOUS; SUBCUTANEOUS at 13:57

## 2020-06-20 RX ADMIN — HEPARIN SODIUM 5000 UNITS: 5000 INJECTION INTRAVENOUS; SUBCUTANEOUS at 21:44

## 2020-06-20 RX ADMIN — Medication 10 ML: at 21:46

## 2020-06-20 RX ADMIN — SODIUM CHLORIDE 100 ML/HR: 9 INJECTION, SOLUTION INTRAVENOUS at 13:53

## 2020-06-20 NOTE — ED NOTES
TRANSFER - OUT REPORT:    Verbal report given to GRADY Torres (name) on Teresa Mow  being transferred to  (unit) for routine progression of care       Report consisted of patients Situation, Background, Assessment and   Recommendations(SBAR). Information from the following report(s) SBAR, Kardex, ED Summary, Intake/Output, MAR and Recent Results was reviewed with the receiving nurse. Lines:   Peripheral IV 06/19/20 Right Antecubital (Active)       Peripheral IV 06/19/20 Left Hand (Active)        Opportunity for questions and clarification was provided.       Patient transported with:   Pik[facility]

## 2020-06-20 NOTE — PROGRESS NOTES
Chart screened by  for potential discharge needs or concerns. None identified at this time. Please notify/consult  if any discharge needs arise. Care Management Interventions  PCP Verified by CM:  Yes  Last Visit to PCP: 05/19/20  Discharge Durable Medical Equipment: No  Physical Therapy Consult: No  Occupational Therapy Consult: No  Current Support Network: Own Home, Lives with Spouse  Discharge Location  Discharge Placement: Unable to determine at this time

## 2020-06-20 NOTE — PROGRESS NOTES
Hospitalist Progress Note    Admission Date: 2020  5:23 PM  Reason for Admission/Hospital Course: Unresponsive episode [R41.89]      24 Hour Events:  Patient seen and examined at bedside this morning. Patient reports feeling well with no complaints. He is unclear what happened but believes he just fell asleep in the chair encounter physical air supplied by having his neck leaned forward. He denies any fevers, chills, shortness of breath, chest pain, abdominal pain, nausea, vomiting. ROS:  10 point review of systems is otherwise negative with the exception of the elements mentioned above. Allergies   Allergen Reactions    Phenergan [Promethazine] Anaphylaxis    Codeine Nausea and Vomiting       OBJECTIVE:  Patient Vitals for the past 8 hrs:   BP Temp Pulse Resp SpO2   20 1117 111/70 97.9 °F (36.6 °C) 67 18 95 %   20 0928 97/66 98 °F (36.7 °C) 85 18 96 %     Temp (24hrs), Av °F (36.7 °C), Min:97.9 °F (36.6 °C), Max:98 °F (36.7 °C)     0701 -  1900  In: -   Out: 325 [Urine:325]    PHYSICAL EXAM:  General:  Well-appearing white male, looks stated age. No acute distress. Pleasant. HEENT: Normocephalic, atraumatic, oral mucosa is moist, PERRLA  CV:   RRR, no murmurs  Lungs:   Clear to auscultation bilaterally  Abdomen:   Soft, nontender, nondistended, normal bowel sounds  MSK:   No skeletal deformity seen  Skin:   No rash or jaundice    Neuro:  CN II through XII grossly intact. No focal neurological deficits. Psych: Alert, cooperative, calm.        Labs:      Recent Labs     20  0611 20  1737   WBC 9.6 10.3   RBC 3.74* 3.93*   HGB 11.6* 12.1*   HCT 35.0* 37.7*   MCV 93.6 95.9   MCH 31.0 30.8   MCHC 33.1 32.1   RDW 14.9* 14.9*    273   GRANS 73 75   LYMPH 14 12*   MONOS 11 11   EOS 1 1   BASOS 0 0   IG 0 1   DF AUTOMATED AUTOMATED   ANEU 7.0 7.7   ABL 1.4 1.3   ABM 1.1 1.1   JOE 0.1 0.1   ABB 0.0 0.0   AIG 0.0 0.1        Recent Labs     20  1843 06/19/20  1737    139   K 3.9 4.6   * 108*   CO2 26 21   AGAP 7 10   GLU 95 143*   BUN 14 21   CREA 1.05 1.48   GFRAA >60 59*   GFRNA >60 49*   CA 8.8 8.9   AP 97 112   TP 6.5 7.1   ALB 3.0* 3.2   GLOB 3.5 3.9*   AGRAT 0.9* 0.8*   MG 2.2 2.2   PHOS 3.3  --        Recent Results (from the past 24 hour(s))   CBC WITH AUTOMATED DIFF    Collection Time: 06/19/20  5:37 PM   Result Value Ref Range    WBC 10.3 4.3 - 11.1 K/uL    RBC 3.93 (L) 4.23 - 5.6 M/uL    HGB 12.1 (L) 13.6 - 17.2 g/dL    HCT 37.7 (L) 41.1 - 50.3 %    MCV 95.9 79.6 - 97.8 FL    MCH 30.8 26.1 - 32.9 PG    MCHC 32.1 31.4 - 35.0 g/dL    RDW 14.9 (H) 11.9 - 14.6 %    PLATELET 457 428 - 310 K/uL    MPV 10.0 9.4 - 12.3 FL    ABSOLUTE NRBC 0.00 0.0 - 0.2 K/uL    DF AUTOMATED      NEUTROPHILS 75 43 - 78 %    LYMPHOCYTES 12 (L) 13 - 44 %    MONOCYTES 11 4.0 - 12.0 %    EOSINOPHILS 1 0.5 - 7.8 %    BASOPHILS 0 0.0 - 2.0 %    IMMATURE GRANULOCYTES 1 0.0 - 5.0 %    ABS. NEUTROPHILS 7.7 1.7 - 8.2 K/UL    ABS. LYMPHOCYTES 1.3 0.5 - 4.6 K/UL    ABS. MONOCYTES 1.1 0.1 - 1.3 K/UL    ABS. EOSINOPHILS 0.1 0.0 - 0.8 K/UL    ABS. BASOPHILS 0.0 0.0 - 0.2 K/UL    ABS. IMM. GRANS. 0.1 0.0 - 0.5 K/UL   METABOLIC PANEL, COMPREHENSIVE    Collection Time: 06/19/20  5:37 PM   Result Value Ref Range    Sodium 139 136 - 145 mmol/L    Potassium 4.6 3.5 - 5.1 mmol/L    Chloride 108 (H) 98 - 107 mmol/L    CO2 21 21 - 32 mmol/L    Anion gap 10 7 - 16 mmol/L    Glucose 143 (H) 65 - 100 mg/dL    BUN 21 8 - 23 MG/DL    Creatinine 1.48 0.8 - 1.5 MG/DL    GFR est AA 59 (L) >60 ml/min/1.73m2    GFR est non-AA 49 (L) >60 ml/min/1.73m2    Calcium 8.9 8.3 - 10.4 MG/DL    Bilirubin, total 0.4 0.2 - 1.1 MG/DL    ALT (SGPT) 31 12 - 65 U/L    AST (SGOT) 22 15 - 37 U/L    Alk.  phosphatase 112 50 - 136 U/L    Protein, total 7.1 6.3 - 8.2 g/dL    Albumin 3.2 3.2 - 4.6 g/dL    Globulin 3.9 (H) 2.3 - 3.5 g/dL    A-G Ratio 0.8 (L) 1.2 - 3.5     LACTIC ACID    Collection Time: 06/19/20  5:37 PM Result Value Ref Range    Lactic acid 6.6 (HH) 0.4 - 2.0 MMOL/L   PROCALCITONIN    Collection Time: 06/19/20  5:37 PM   Result Value Ref Range    Procalcitonin <0.05 ng/mL   MAGNESIUM    Collection Time: 06/19/20  5:37 PM   Result Value Ref Range    Magnesium 2.2 1.8 - 2.4 mg/dL   TROPONIN-HIGH SENSITIVITY    Collection Time: 06/19/20  5:37 PM   Result Value Ref Range    Troponin-High Sensitivity 4.3 0 - 14 pg/mL   CULTURE, BLOOD    Collection Time: 06/19/20  5:37 PM   Result Value Ref Range    Special Requests: NO SPECIAL REQUESTS  RIGHT  Antecubital        Culture result: NO GROWTH AFTER 14 HOURS     D DIMER    Collection Time: 06/19/20  5:37 PM   Result Value Ref Range    D DIMER 1.31 (HH) <0.56 ug/ml(FEU)   EKG, 12 LEAD, INITIAL    Collection Time: 06/19/20  5:43 PM   Result Value Ref Range    Ventricular Rate 99 BPM    Atrial Rate 99 BPM    P-R Interval 120 ms    QRS Duration 92 ms    Q-T Interval 334 ms    QTC Calculation (Bezet) 428 ms    Calculated P Axis 24 degrees    Calculated R Axis 14 degrees    Calculated T Axis 34 degrees    Diagnosis       !! AGE AND GENDER SPECIFIC ECG ANALYSIS !!   Normal sinus rhythm  Low voltage QRS  Incomplete right bundle branch block  Cannot rule out Inferior infarct , age undetermined  Abnormal ECG  When compared with ECG of 03-OCT-2017 00:07,  Premature ventricular complexes are no longer Present  Confirmed by Shayy Gabriel ([id number]) on 6/20/2020 8:07:22 AM     POC G3    Collection Time: 06/19/20  6:10 PM   Result Value Ref Range    Device: NASAL CANNULA      pH (POC) 7.421 7.35 - 7.45      pCO2 (POC) 31.0 (L) 35 - 45 MMHG    pO2 (POC) 117 (H) 75 - 100 MMHG    HCO3 (POC) 20.2 (L) 22 - 26 MMOL/L    sO2 (POC) 99 (H) 95 - 98 %    Base deficit (POC) 3 mmol/L    Allens test (POC) NOT APPLICABLE      Site RIGHT BRACHIAL      Specimen type (POC) ARTERIAL      Performed by Kamari     CO2, POC 21 MMOL/L    Flow rate (POC) 6.000 L/min    Critical value read back 00:01 Respiratory comment: PhysicianNotified     COLLECT TIME 1,808     CULTURE, BLOOD    Collection Time: 06/19/20  6:59 PM   Result Value Ref Range    Special Requests: NO SPECIAL REQUESTS  LEFT  HAND        Culture result: NO GROWTH AFTER 12 HOURS     URINALYSIS W/ RFLX MICROSCOPIC    Collection Time: 06/19/20  8:20 PM   Result Value Ref Range    Color YELLOW      Appearance CLEAR      Specific gravity 1.021 1.001 - 1.023      pH (UA) 6.0 5.0 - 9.0      Protein Negative NEG mg/dL    Glucose Negative mg/dL    Ketone Negative NEG mg/dL    Bilirubin Negative NEG      Blood TRACE (A) NEG      Urobilinogen 0.2 0.2 - 1.0 EU/dL    Nitrites Negative NEG      Leukocyte Esterase Negative NEG      WBC 0 0 /hpf    RBC 3-5 0 /hpf    Epithelial cells 0 0 /hpf    Bacteria 0 0 /hpf    Casts 0 0 /lpf   TROPONIN-HIGH SENSITIVITY    Collection Time: 06/19/20 11:59 PM   Result Value Ref Range    Troponin-High Sensitivity 9.5 0 - 14 pg/mL   TSH 3RD GENERATION    Collection Time: 06/20/20 12:00 AM   Result Value Ref Range    TSH 0.610 0.358 - 3.740 uIU/mL   T4, FREE    Collection Time: 06/20/20 12:00 AM   Result Value Ref Range    T4, Free 1.2 0.9 - 1.8 NG/DL   METABOLIC PANEL, COMPREHENSIVE    Collection Time: 06/20/20  6:11 AM   Result Value Ref Range    Sodium 142 136 - 145 mmol/L    Potassium 3.9 3.5 - 5.1 mmol/L    Chloride 109 (H) 98 - 107 mmol/L    CO2 26 21 - 32 mmol/L    Anion gap 7 7 - 16 mmol/L    Glucose 95 65 - 100 mg/dL    BUN 14 8 - 23 MG/DL    Creatinine 1.05 0.8 - 1.5 MG/DL    GFR est AA >60 >60 ml/min/1.73m2    GFR est non-AA >60 >60 ml/min/1.73m2    Calcium 8.8 8.3 - 10.4 MG/DL    Bilirubin, total 0.5 0.2 - 1.1 MG/DL    ALT (SGPT) 27 12 - 65 U/L    AST (SGOT) 23 15 - 37 U/L    Alk.  phosphatase 97 50 - 136 U/L    Protein, total 6.5 6.3 - 8.2 g/dL    Albumin 3.0 (L) 3.2 - 4.6 g/dL    Globulin 3.5 2.3 - 3.5 g/dL    A-G Ratio 0.9 (L) 1.2 - 3.5     MAGNESIUM    Collection Time: 06/20/20  6:11 AM   Result Value Ref Range Magnesium 2.2 1.8 - 2.4 mg/dL   PHOSPHORUS    Collection Time: 06/20/20  6:11 AM   Result Value Ref Range    Phosphorus 3.3 2.3 - 3.7 MG/DL   CK    Collection Time: 06/20/20  6:11 AM   Result Value Ref Range     (H) 21 - 215 U/L   CBC WITH AUTOMATED DIFF    Collection Time: 06/20/20  6:11 AM   Result Value Ref Range    WBC 9.6 4.3 - 11.1 K/uL    RBC 3.74 (L) 4.23 - 5.6 M/uL    HGB 11.6 (L) 13.6 - 17.2 g/dL    HCT 35.0 (L) 41.1 - 50.3 %    MCV 93.6 79.6 - 97.8 FL    MCH 31.0 26.1 - 32.9 PG    MCHC 33.1 31.4 - 35.0 g/dL    RDW 14.9 (H) 11.9 - 14.6 %    PLATELET 430 188 - 569 K/uL    MPV 9.8 9.4 - 12.3 FL    ABSOLUTE NRBC 0.00 0.0 - 0.2 K/uL    DF AUTOMATED      NEUTROPHILS 73 43 - 78 %    LYMPHOCYTES 14 13 - 44 %    MONOCYTES 11 4.0 - 12.0 %    EOSINOPHILS 1 0.5 - 7.8 %    BASOPHILS 0 0.0 - 2.0 %    IMMATURE GRANULOCYTES 0 0.0 - 5.0 %    ABS. NEUTROPHILS 7.0 1.7 - 8.2 K/UL    ABS. LYMPHOCYTES 1.4 0.5 - 4.6 K/UL    ABS. MONOCYTES 1.1 0.1 - 1.3 K/UL    ABS. EOSINOPHILS 0.1 0.0 - 0.8 K/UL    ABS. BASOPHILS 0.0 0.0 - 0.2 K/UL    ABS. IMM.  GRANS. 0.0 0.0 - 0.5 K/UL   TROPONIN-HIGH SENSITIVITY    Collection Time: 06/20/20  6:11 AM   Result Value Ref Range    Troponin-High Sensitivity 9.7 0 - 14 pg/mL       Current Facility-Administered Medications   Medication Dose Route Frequency    0.9% sodium chloride infusion  100 mL/hr IntraVENous CONTINUOUS    aspirin chewable tablet 81 mg  81 mg Oral DAILY    atorvastatin (LIPITOR) tablet 80 mg  80 mg Oral DAILY    nitroglycerin (NITROSTAT) tablet 0.4 mg  0.4 mg SubLINGual Q5MIN PRN    pantoprazole (PROTONIX) tablet 40 mg  40 mg Oral DAILY    sodium chloride (NS) flush 5-40 mL  5-40 mL IntraVENous Q8H    sodium chloride (NS) flush 5-40 mL  5-40 mL IntraVENous PRN    acetaminophen (TYLENOL) tablet 650 mg  650 mg Oral Q4H PRN    ondansetron (ZOFRAN) injection 4 mg  4 mg IntraVENous Q4H PRN    senna-docusate (PERICOLACE) 8.6-50 mg per tablet 1 Tab  1 Tab Oral BID PRN    heparin (porcine) injection 5,000 Units  5,000 Units SubCUTAneous Q8H          Imaging:    Ct Chest W Cont    Result Date: 6/19/2020  CT CHEST WITH CONTRAST  6/19/2020 7:52 PM HISTORY:  Patient unresponsive after cardiac arrest TECHNIQUE: The patient received 100 mL Isovue-370 nonionic IV contrast and axial images were obtained through the chest. Coronal reformatted images were generated. All CT scans at this facility used dose modulation, interactive reconstruction and/or weight based dosing when appropriate to reduce radiation dose to as low as reasonably achievable. COMPARISON:  Portable chest x-ray same date FINDINGS: There are no filling defects within the main or proximal segmental pulmonary artery suggestive of emboli. The thoracic aorta is well-opacified without dissection. There is no thoracic adenopathy. Changes of a CABG are present. A calcified pleural plaque is present along the diaphragmatic surface on the right side. There is no consolidation, pleural effusions or pulmonary edema. A hiatal hernia is present. Included portions of the upper abdomen demonstrate normal-appearing adrenal glands. Bone windows demonstrate no aggressive osseous lesions. IMPRESSION: 1. No pulmonary embolism 2. Hiatal hernia. Xr Chest Port    Result Date: 6/19/2020  AP PORTABLE CHEST X-RAY 6/19/2020 5:39 PM HISTORY: shob  shortness of breath COMPARISON: August 21, 2017 FINDINGS:  Median sternotomy wires are present. EKG leads are present. There is no consolidation, pleural effusions or advanced pulmonary edema. A linear density along the right hemidiaphragm appears unchanged. IMPRESSION: No consolidation.          ASSESSMENT:    Problem List  Date Reviewed: 5/21/2020          Codes Class Noted    * (Principal) Unresponsive episode ICD-10-CM: R41.89  ICD-9-CM: 780.09  6/19/2020        Chest pain ICD-10-CM: R07.9  ICD-9-CM: 786.50  7/20/2018        Dyspnea on exertion ICD-10-CM: R06.00  ICD-9-CM: 786.09  7/20/2018 Hip fx ([facility] [address] ICD-10-CM: G20.244Q  ICD-9-CM: 820.8  8/21/2017        Arteriosclerosis of coronary artery bypass graft ICD-10-CM: I25.810  ICD-9-CM: 414.04  11/2/2015        Dyspnea/Shortness of breath ICD-10-CM: R06.02  ICD-9-CM: 786.05  11/2/2015        Coronary atherosclerosis of native coronary artery ICD-10-CM: I25.10  ICD-9-CM: 414.01  11/2/2015        Extremity atherosclerosis with intermittent claudication [facility]) ICD-10-CM: Q96.327  ICD-9-CM: 440.21  11/2/2015        Hyperlipidemia ICD-10-CM: E78.5  ICD-9-CM: 272.4  11/2/2015        Elevated prostate specific antigen (PSA) ICD-10-CM: R97.20  ICD-9-CM: 790.93  Unknown        BPH with obstruction/lower urinary tract symptoms ICD-10-CM: N40.1, N13.8  ICD-9-CM: 600.01, 599.69  Unknown        Pure hypercholesterolemia ICD-10-CM: E78.00  ICD-9-CM: 272.0  Unknown        Esophageal reflux ICD-10-CM: K21.9  ICD-9-CM: 530.81  Unknown        Heart disease, unspecified ICD-10-CM: I51.9  ICD-9-CM: 429.9  Unknown                PLAN:    Syncope with unresponsive episode  Work-up for syncope so far negative. Telemetry shows no arrhythmias. CT head is currently pending. Patient believes he fell asleep with his neck in the forward position cutting off his own oxygen supply. Patient has had this happen once before. Currently back to baseline and saturating 100% on room air. Follow-up echocardiogram and head CT. If negative can DC today. CAD  Hypertension  Hyperlipidemia  No evidence of angina or chest pain. Tropes are negative. Continue with aspirin, statin, nitro as needed  Continue with telemetry monitoring for now    GERD  Continue Protonix    Fluids: Not indicated  Electrolytes: Replete as needed  Nutrition: Cardiac diet  CODE STATUS: Full    DVT prophylaxis: Heparin    Anticipate discharge in the next 24 hours if work-up is negative.

## 2020-06-20 NOTE — PROGRESS NOTES
06/19/20 2226 06/19/20 4715   Vital Signs   Level of Consciousness  --  Alert   /85 172/80   MAP (Monitor) 124  --

## 2020-06-20 NOTE — PROGRESS NOTES
Problem: General Medical Care Plan  Goal: *Absence of infection signs and symptoms  Outcome: Progressing Towards Goal     Problem: Falls - Risk of  Goal: *Absence of Falls  Description: Document Chely Blood Fall Risk and appropriate interventions in the flowsheet.   Outcome: Progressing Towards Goal  Note: Fall Risk Interventions:  Mobility Interventions: Bed/chair exit alarm    Mentation Interventions: Bed/chair exit alarm, Door open when patient unattended    Medication Interventions: Patient to call before getting OOB    Elimination Interventions: Call light in reach, Patient to call for help with toileting needs    History of Falls Interventions: Bed/chair exit alarm         Problem: Patient Education: Go to Patient Education Activity  Goal: Patient/Family Education  Outcome: Progressing Towards Goal     Problem: General Medical Care Plan  Goal: *Vital signs within specified parameters  Outcome: Progressing Towards Goal  Goal: *Labs within defined limits  Outcome: Progressing Towards Goal  Goal: *Absence of infection signs and symptoms  Outcome: Progressing Towards Goal  Goal: *Optimal pain control at patient's stated goal  Outcome: Progressing Towards Goal  Goal: *Skin integrity maintained  Outcome: Progressing Towards Goal  Goal: *Fluid volume balance  Outcome: Progressing Towards Goal  Goal: *Optimize nutritional status  Outcome: Progressing Towards Goal  Goal: *Anxiety reduced or absent  Outcome: Progressing Towards Goal  Goal: *Progressive mobility and function (eg: ADL's)  Outcome: Progressing Towards Goal     Problem: Patient Education: Go to Patient Education Activity  Goal: Patient/Family Education  Outcome: Progressing Towards Goal

## 2020-06-20 NOTE — PROGRESS NOTES
Problem: Falls - Risk of  Goal: *Absence of Falls  Description: Document Lorena Sung Fall Risk and appropriate interventions in the flowsheet.   Outcome: Progressing Towards Goal  Note: Fall Risk Interventions:  Mobility Interventions: OT consult for ADLs, Patient to call before getting OOB, PT Consult for mobility concerns    Mentation Interventions: Bed/chair exit alarm, Door open when patient unattended    Medication Interventions: Bed/chair exit alarm, Patient to call before getting OOB, Teach patient to arise slowly    Elimination Interventions: Bed/chair exit alarm, Call light in reach, Patient to call for help with toileting needs, Toileting schedule/hourly rounds    History of Falls Interventions: Bed/chair exit alarm, Consult care management for discharge planning, Door open when patient unattended         Problem: Patient Education: Go to Patient Education Activity  Goal: Patient/Family Education  Outcome: Progressing Towards Goal     Problem: General Medical Care Plan  Goal: *Vital signs within specified parameters  Outcome: Progressing Towards Goal  Goal: *Labs within defined limits  Outcome: Progressing Towards Goal  Goal: *Absence of infection signs and symptoms  Outcome: Progressing Towards Goal  Goal: *Optimal pain control at patient's stated goal  Outcome: Progressing Towards Goal  Goal: *Skin integrity maintained  Outcome: Progressing Towards Goal  Goal: *Fluid volume balance  Outcome: Progressing Towards Goal  Goal: *Optimize nutritional status  Outcome: Progressing Towards Goal  Goal: *Anxiety reduced or absent  Outcome: Progressing Towards Goal  Goal: *Progressive mobility and function (eg: ADL's)  Outcome: Progressing Towards Goal     Problem: Patient Education: Go to Patient Education Activity  Goal: Patient/Family Education  Outcome: Progressing Towards Goal

## 2020-06-20 NOTE — PROGRESS NOTES
TRANSFER - IN REPORT:    Verbal report received from Eugene Villareal RN(name) on Janee Austin  being received from ED(unit) for routine progression of care      Report consisted of patients Situation, Background, Assessment and   Recommendations(SBAR). Information from the following report(s) SBAR was reviewed with the receiving nurse. Opportunity for questions and clarification was provided. Assessment completed upon patients arrival to unit and care assumed.

## 2020-06-20 NOTE — PROGRESS NOTES
06/19/20 5740   Dual Skin Pressure Injury Assessment   Dual Skin Pressure Injury Assessment WDL   Second Care Provider (Based on Facility Policy) FreddyRN   Skin Integumentary   Skin Integumentary (WDL) WDL    Pressure  Injury Documentation No Pressure Injury Noted-Pressure Ulcer Prevention Initiated   Skin Color Appropriate for ethnicity   Skin Condition/Temp Warm;Dry   Skin Integrity Intact   Turgor Non-tenting   Hair Growth Present   Varicosities Present   Wound Prevention and Protection Methods   Orientation of Wound Prevention Posterior   Location of Wound Prevention Sacrum/Coccyx   Dressing Present  Intact, not due to be changed   Wound Offloading (Prevention Methods) Bed, pressure redistribution/air;Bed, pressure reduction mattress;Pillows;Repositioning

## 2020-06-21 VITALS
BODY MASS INDEX: 31.67 KG/M2 | RESPIRATION RATE: 18 BRPM | TEMPERATURE: 98.2 F | OXYGEN SATURATION: 94 % | WEIGHT: 209 LBS | DIASTOLIC BLOOD PRESSURE: 73 MMHG | SYSTOLIC BLOOD PRESSURE: 122 MMHG | HEIGHT: 68 IN | HEART RATE: 76 BPM

## 2020-06-21 LAB
ACC. NO. FROM MICRO ORDER, ACCP: ABNORMAL
ALBUMIN SERPL-MCNC: 0.6 G/DL (ref 3.2–4.6)
ALBUMIN/GLOB SERPL: 0.1 {RATIO} (ref 1.2–3.5)
ALP SERPL-CCNC: 93 U/L (ref 50–136)
ALT SERPL-CCNC: 25 U/L (ref 12–65)
ANION GAP SERPL CALC-SCNC: 5 MMOL/L (ref 7–16)
AST SERPL-CCNC: 19 U/L (ref 15–37)
BASOPHILS # BLD: 0 K/UL (ref 0–0.2)
BASOPHILS NFR BLD: 0 % (ref 0–2)
BILIRUB SERPL-MCNC: 0.5 MG/DL (ref 0.2–1.1)
BUN SERPL-MCNC: 16 MG/DL (ref 8–23)
CALCIUM SERPL-MCNC: 8.3 MG/DL (ref 8.3–10.4)
CHLORIDE SERPL-SCNC: 110 MMOL/L (ref 98–107)
CO2 SERPL-SCNC: 28 MMOL/L (ref 21–32)
CREAT SERPL-MCNC: 1.23 MG/DL (ref 0.8–1.5)
DIFFERENTIAL METHOD BLD: ABNORMAL
EOSINOPHIL # BLD: 0.2 K/UL (ref 0–0.8)
EOSINOPHIL NFR BLD: 2 % (ref 0.5–7.8)
ERYTHROCYTE [DISTWIDTH] IN BLOOD BY AUTOMATED COUNT: 15.1 % (ref 11.9–14.6)
GLOBULIN SER CALC-MCNC: 5.6 G/DL (ref 2.3–3.5)
GLUCOSE SERPL-MCNC: 99 MG/DL (ref 65–100)
HCT VFR BLD AUTO: 33.5 % (ref 41.1–50.3)
HGB BLD-MCNC: 11 G/DL (ref 13.6–17.2)
IMM GRANULOCYTES # BLD AUTO: 0.1 K/UL (ref 0–0.5)
IMM GRANULOCYTES NFR BLD AUTO: 1 % (ref 0–5)
INTERPRETATION: ABNORMAL
LYMPHOCYTES # BLD: 1.7 K/UL (ref 0.5–4.6)
LYMPHOCYTES NFR BLD: 18 % (ref 13–44)
MCH RBC QN AUTO: 31.3 PG (ref 26.1–32.9)
MCHC RBC AUTO-ENTMCNC: 32.8 G/DL (ref 31.4–35)
MCV RBC AUTO: 95.2 FL (ref 79.6–97.8)
MECA (METHICILLIN-RESISTANCE GENES), MRGP: NOT DETECTED
MONOCYTES # BLD: 1.1 K/UL (ref 0.1–1.3)
MONOCYTES NFR BLD: 12 % (ref 4–12)
NEUTS SEG # BLD: 6.1 K/UL (ref 1.7–8.2)
NEUTS SEG NFR BLD: 66 % (ref 43–78)
NRBC # BLD: 0 K/UL (ref 0–0.2)
PLATELET # BLD AUTO: 240 K/UL (ref 150–450)
PMV BLD AUTO: 10.1 FL (ref 9.4–12.3)
POTASSIUM SERPL-SCNC: 4 MMOL/L (ref 3.5–5.1)
PROT SERPL-MCNC: 6.2 G/DL (ref 6.3–8.2)
RBC # BLD AUTO: 3.52 M/UL (ref 4.23–5.6)
SODIUM SERPL-SCNC: 143 MMOL/L (ref 136–145)
STAPHYLOCOCCUS, STAPP: DETECTED
WBC # BLD AUTO: 9.1 K/UL (ref 4.3–11.1)

## 2020-06-21 PROCEDURE — 87150 DNA/RNA AMPLIFIED PROBE: CPT

## 2020-06-21 PROCEDURE — 80053 COMPREHEN METABOLIC PANEL: CPT

## 2020-06-21 PROCEDURE — 74011250636 HC RX REV CODE- 250/636: Performed by: INTERNAL MEDICINE

## 2020-06-21 PROCEDURE — 85025 COMPLETE CBC W/AUTO DIFF WBC: CPT

## 2020-06-21 PROCEDURE — 36415 COLL VENOUS BLD VENIPUNCTURE: CPT

## 2020-06-21 PROCEDURE — 74011250637 HC RX REV CODE- 250/637: Performed by: INTERNAL MEDICINE

## 2020-06-21 RX ADMIN — HEPARIN SODIUM 5000 UNITS: 5000 INJECTION INTRAVENOUS; SUBCUTANEOUS at 05:14

## 2020-06-21 RX ADMIN — Medication 10 ML: at 05:15

## 2020-06-21 RX ADMIN — ASPIRIN 81 MG 81 MG: 81 TABLET ORAL at 09:09

## 2020-06-21 RX ADMIN — SODIUM CHLORIDE 100 ML/HR: 9 INJECTION, SOLUTION INTRAVENOUS at 01:15

## 2020-06-21 RX ADMIN — ATORVASTATIN CALCIUM 80 MG: 80 TABLET, FILM COATED ORAL at 09:09

## 2020-06-21 RX ADMIN — PANTOPRAZOLE SODIUM 40 MG: 40 TABLET, DELAYED RELEASE ORAL at 09:09

## 2020-06-21 NOTE — PHYSICIAN ADVISORY
Letter of Status Determination: Current Status INPATIENT is Appropriate Pt Name:  Madilyn Libman MR#  [id number] [facility]#   [id number] Room and Hospital  715/01  @ [address] [place] [address] Hospitalization date  6/19/2020  5:23 PM  
Current Attending Physician  Helga Lucio MD  
Principal diagnosis  Unresponsive episode Clinicals  79 y.o. y.o  male hospitalized with syncope. W/u unrevealing so far.  
79 y.o. male with a history of CAD/MI status post PCI and CABG, hypertension, hyperlipidemia, and GERD presenting with unresponsive episode. Majority of history is provided by the patient's wife. She states that he was at home for about 3-1/2 hours earlier today by himself. His last known normal was 2:30 PM during a phone call where she spoke with the patient herself and noted him to have normal mental status. Prior to that the last time she saw him he was functioning normal, talking, alert and ambulatory [facility] criteria Does  NOT apply STATUS DETERMINATION  On the basis of clinical data, available documentaion, we believe that the current status of this patient as INPATIENT is Appropriate This patient is at above high risk of deterioration based on documented presenting clinical data, comorbid conditions, high risk of adverse events and current acute care course. Mr. Madilyn Libman now meets Inpatient Admission status criteria in accordance with CMS regulation Section 43 .3. Specifically, due to medical necessity the patient's stay now exceeds Two Midnights. It is our recommendation that this patient's hospitalization status should be upgraded from  INPATIENT to INPATIENT status. The final decision of the patient's hospitalization status depends on the attending physician's judgment Additional comments [place]  [place] / [place] / Product Type: Medicare / Insurance Information [place] [address] [facility] MEDICARE PART A AND B Phone: [phone] Subscriber: Boom Bryant Subscriber#: [id number] Group#:  Precert#: [employer] Phone:   
 Subscriber: Boom Bryant Subscriber#: [id number] Group#:  Precert#:   
  
 
 
The information in this document is a recommendation to be used for utilization review and utilization management purposes only. This recommendation is not an order. The recommendation is made based on the information reviewed at the time of the referral, is pursuant to the [facility] Conditions of Participation (42 CFR Part 482), and is neither a judgment nor an assessment with regard to the appropriateness or quality of clinical care. For all Managed Care patients: The Criteria are intended solely for use as screening guidelines with respect to the medical appropriateness of healthcare services and not for final clinical or payment determinations concerning the type or level of medical care provided, or proposed to be provided, to a patient. They help the reviewers determine whether a patient is appropriate for observation or inpatient admission at the time a decision to admit the patient is being made. All efforts are made to apply the pertinent payor criteria ([employer] or [employer]) as well as the clinical judgements based on the information reviewed at the time of the referral.\" Nothing in this document may be used to limit, alter, or affect clinical services provided to the patient named below. No current facility-administered medications on file prior to encounter. Current Outpatient Medications on File Prior to Encounter Medication Sig Dispense Refill  nitroglycerin (NITROSTAT) 0.4 mg SL tablet 1 Tab by SubLINGual route every five (5) minutes as needed for Chest Pain. Up to 3 doses. 25 Tab 4  
 CALCIUM CARBONATE, BULK, Take  by mouth.  coenzyme q10 (ULTRA COQ10) 75 mg cap Take  by mouth.  b complex vitamins tablet Take 1 Tab by mouth daily.  pantoprazole (PROTONIX) 40 mg tablet Take 40 mg by mouth daily.  atorvastatin (LIPITOR) 80 mg tablet Take 80 mg by mouth daily.  aspirin 81 mg chewable tablet Take 81 mg by mouth daily. 9:26 AM 6/21/2020

## 2020-06-21 NOTE — PROGRESS NOTES
Per Dr Keri Roach pt to be DC home today, no anticipated dc needs. Care Management Interventions  PCP Verified by CM:  Yes  Last Visit to PCP: 05/19/20  Discharge Durable Medical Equipment: No  Physical Therapy Consult: No  Occupational Therapy Consult: No  Current Support Network: Own Home, Lives with Spouse  Discharge Location  Discharge Placement: Home

## 2020-06-21 NOTE — DISCHARGE SUMMARY
Discharge Summary     Patient: Savannah Pablo MRN: [id number]  SSN: [id number]    YOB: 1940  Age: 79 y.o. Sex: male       Admit Date: 6/19/2020    Discharge Date: 6/21/2020      Admission Diagnoses: Unresponsive episode [R41.89]    Discharge Diagnoses:   Problem List as of 6/21/2020 Date Reviewed: 5/21/2020          Codes Class Noted - Resolved    * (Principal) Unresponsive episode ICD-10-CM: R41.89  ICD-9-CM: 780.09  6/19/2020 - Present        Chest pain ICD-10-CM: R07.9  ICD-9-CM: 786.50  7/20/2018 - Present        Dyspnea on exertion ICD-10-CM: R06.00  ICD-9-CM: 786.09  7/20/2018 - Present        Hip fx ([facility] ICD-10-CM: Q02.115K  ICD-9-CM: 820.8  8/21/2017 - Present        Arteriosclerosis of coronary artery bypass graft ICD-10-CM: I25.810  ICD-9-CM: 414.04  11/2/2015 - Present        Dyspnea/Shortness of breath ICD-10-CM: R06.02  ICD-9-CM: 786.05  11/2/2015 - Present        Coronary atherosclerosis of native coronary artery ICD-10-CM: I25.10  ICD-9-CM: 414.01  11/2/2015 - Present        Extremity atherosclerosis with intermittent claudication ([facility] ICD-10-CM: I70.219  ICD-9-CM: 440.21  11/2/2015 - Present        Hyperlipidemia ICD-10-CM: E78.5  ICD-9-CM: 272.4  11/2/2015 - Present        Elevated prostate specific antigen (PSA) ICD-10-CM: R97.20  ICD-9-CM: 790.93  Unknown - Present        BPH with obstruction/lower urinary tract symptoms ICD-10-CM: N40.1, N13.8  ICD-9-CM: 600.01, 599.69  Unknown - Present        Pure hypercholesterolemia ICD-10-CM: E78.00  ICD-9-CM: 272.0  Unknown - Present        Esophageal reflux ICD-10-CM: K21.9  ICD-9-CM: 530.81  Unknown - Present        Heart disease, unspecified ICD-10-CM: I51.9  ICD-9-CM: 429.9  Unknown - Present               Discharge Condition: Good    Physical Exam:  General:    Pleasant white male, appears stated age. No acute distress, resting comfortably. HEENT: Normocephalic, atraumatic.   CV:   RRR, no murmurs on my exam  Lungs:   Clear to auscultation bilaterally  Abdomen:   Soft, nontender, nondistended, normal bowel sounds  Extremities: No skeletal deformity seen  Skin:     Clean, dry, intact. No evidence of jaundice or rash  Neuro:  CN II through XII grossly intact  Psych:  Alert, cooperative. Hospital Course:   Merna Beverly is a 79 y.o. male with a history of CAD/MI status post PCI and CABG, hypertension, hyperlipidemia, and GERD presenting with unresponsive episode. Majority of history is provided by the patient's wife. She states that he was at home for about 3-1/2 hours earlier today by himself. His last known normal was 2:30 PM during a phone call where she spoke with the patient herself and noted him to have normal mental status. Prior to that the last time she saw him he was functioning normal, talking, alert and ambulatory. Patient states that at no point today as he had any symptoms of chest pain, shortness of breath, abdominal pain, nausea, vomiting, diarrhea, lightheadedness, dizziness, or focal neurological signs or symptoms. At 430 on returning home, his wife found him slumped over with his head down in the chair. He was unresponsive, sweating profusely, and was noted to have a swollen face and tongue without cyanosis. Patient was drooling and making gurgling sounds. Patient did not respond to tactile stimulus. Wife also state that he had urinated himself and was covered in urine. The wife lowered the patient to the floor and called EMS. By the time EMS arrived and patient was placed on oxygen he began to move his extremities but was not fully awake. En route to the hospital in the ambulance, patient regained consciousness and was alert and oriented and lucid but had no recollection of preceding events. Currently patient has no symptoms or complaints. He has a history of heart disease with PCI x8 and a CABG but has never had a stroke or seizure.   Of note he has had an anaphylactic reaction to Phenergan but has no other known allergic reactions. Patient was admitted to the medical floor for further evaluation and management. Initially, patient was found to be hypoxic and was placed on oxygen. Oxygen was weaned down and patient is currently tolerating room air without issue. All symptoms resolved on its own and lab work revealed no abnormalities. Patient had CT PE, which was negative. Repeat CT head also showed no evidence of stroke or other intracranial abnormalities. Patient's echo was within normal limits. Telemetry monitoring did not show any arrhythmias that could cause some sort of syncope. 6-minute walk test was performed by myself this morning and patient did not desaturate. He does not require home oxygen. Given patient's resolution of symptoms and being back at baseline status, patient is medically stable for discharge today. Patient would not be discharged any additional medications or therapies. Consults: None    Significant Diagnostic Studies: Ct Head Wo Cont    Result Date: 6/20/2020  HEAD CT WITHOUT CONTRAST  6/20/2020 HISTORY:   LOC  episode of unresponsiveness TECHNIQUE: Noncontrast axial images were obtained through the brain. All CT scans at this facility used dose modulation, interactive reconstruction and/or weight based dosing when appropriate to reduce radiation dose to as low as reasonably achievable. COMPARISON: None FINDINGS: There is no acute intracranial hemorrhage, significant mass effect or CT evidence of acute large artery territorial infarction. Please note that a hyperacute infarct or small vessel infarct may not be apparent on initial CT imaging. Mild cerebral volume loss is present. Areas of low attenuation scattered throughout the white matter suggests chronic small vessel ischemic disease. There is an age indeterminate but likely old lacunar infarct in the right basal ganglia. There is no hydrocephalus , intra-axial mass or abnormal extra-axial fluid collection.  There are no displaced skull fractures. The mastoid air cells and paranasal sinuses are clear where imaged. IMPRESSION: White matter findings compatible with chronic small vessel ischemic disease along with age indeterminate right basal ganglia lacunar infarct. Ct Chest W Cont    Result Date: 6/19/2020  CT CHEST WITH CONTRAST  6/19/2020 7:52 PM HISTORY:  Patient unresponsive after cardiac arrest TECHNIQUE: The patient received 100 mL Isovue-370 nonionic IV contrast and axial images were obtained through the chest. Coronal reformatted images were generated. All CT scans at this facility used dose modulation, interactive reconstruction and/or weight based dosing when appropriate to reduce radiation dose to as low as reasonably achievable. COMPARISON:  Portable chest x-ray same date FINDINGS: There are no filling defects within the main or proximal segmental pulmonary artery suggestive of emboli. The thoracic aorta is well-opacified without dissection. There is no thoracic adenopathy. Changes of a CABG are present. A calcified pleural plaque is present along the diaphragmatic surface on the right side. There is no consolidation, pleural effusions or pulmonary edema. A hiatal hernia is present. Included portions of the upper abdomen demonstrate normal-appearing adrenal glands. Bone windows demonstrate no aggressive osseous lesions. IMPRESSION: 1. No pulmonary embolism 2. Hiatal hernia. Xr Chest Port    Result Date: 6/19/2020  AP PORTABLE CHEST X-RAY 6/19/2020 5:39 PM HISTORY: shob  shortness of breath COMPARISON: August 21, 2017 FINDINGS:  Median sternotomy wires are present. EKG leads are present. There is no consolidation, pleural effusions or advanced pulmonary edema. A linear density along the right hemidiaphragm appears unchanged. IMPRESSION: No consolidation.        Disposition: home    Discharge Medications:   Current Discharge Medication List      CONTINUE these medications which have NOT CHANGED Details   nitroglycerin (NITROSTAT) 0.4 mg SL tablet 1 Tab by SubLINGual route every five (5) minutes as needed for Chest Pain. Up to 3 doses. Qty: 25 Tab, Refills: 4    Associated Diagnoses: Arteriosclerosis of coronary artery bypass graft; Atherosclerosis of native coronary artery of native heart with stable angina pectoris (HCC)      CALCIUM CARBONATE, BULK, Take  by mouth.      coenzyme q10 (ULTRA COQ10) 75 mg cap Take  by mouth.      b complex vitamins tablet Take 1 Tab by mouth daily. pantoprazole (PROTONIX) 40 mg tablet Take 40 mg by mouth daily. atorvastatin (LIPITOR) 80 mg tablet Take 80 mg by mouth daily. aspirin 81 mg chewable tablet Take 81 mg by mouth daily. Activity: Activity as tolerated  Diet: Regular Diet  Wound Care: None needed    Follow-up Appointments   Procedures    FOLLOW UP VISIT Appointment in: One Week Follow-up with your PCP within the next 7 days for posthospitalization follow-up. Follow-up with your PCP within the next 7 days for posthospitalization follow-up. Standing Status:   Standing     Number of Occurrences:   1     Order Specific Question:   Appointment in     Answer:    One Week       Signed By: Wicho Bernal MD     June 21, 2020

## 2020-06-22 LAB
BACTERIA SPEC CULT: ABNORMAL
GRAM STN SPEC: ABNORMAL
SERVICE CMNT-IMP: ABNORMAL

## 2020-06-24 LAB
BACTERIA SPEC CULT: NORMAL
SERVICE CMNT-IMP: NORMAL

## 2020-07-30 ENCOUNTER — APPOINTMENT (OUTPATIENT)
Dept: GENERAL RADIOLOGY | Age: 80
End: 2020-07-30
Payer: MEDICARE

## 2020-07-30 ENCOUNTER — HOSPITAL ENCOUNTER (EMERGENCY)
Age: 80
Discharge: HOME | End: 2020-07-30
Payer: MEDICARE

## 2020-07-30 ENCOUNTER — APPOINTMENT (OUTPATIENT)
Dept: CT IMAGING | Age: 80
End: 2020-07-30
Payer: MEDICARE

## 2020-07-30 VITALS
BODY MASS INDEX: 31.67 KG/M2 | SYSTOLIC BLOOD PRESSURE: 121 MMHG | RESPIRATION RATE: 16 BRPM | HEIGHT: 68 IN | OXYGEN SATURATION: 98 % | TEMPERATURE: 97.8 F | WEIGHT: 209 LBS | HEART RATE: 84 BPM | DIASTOLIC BLOOD PRESSURE: 63 MMHG

## 2020-07-30 DIAGNOSIS — R56.9 SEIZURE (HCC): Primary | ICD-10-CM

## 2020-07-30 LAB
ALBUMIN SERPL-MCNC: 3.4 G/DL (ref 3.2–4.6)
ALBUMIN/GLOB SERPL: 0.9 {RATIO} (ref 1.2–3.5)
ALP SERPL-CCNC: 98 U/L (ref 50–136)
ALT SERPL-CCNC: 29 U/L (ref 12–65)
ANION GAP SERPL CALC-SCNC: 12 MMOL/L (ref 7–16)
ARTERIAL PATENCY WRIST A: YES
AST SERPL-CCNC: 30 U/L (ref 15–37)
ATRIAL RATE: 99 BPM
BASE DEFICIT BLD-SCNC: 5 MMOL/L
BASOPHILS # BLD: 0 K/UL (ref 0–0.2)
BASOPHILS NFR BLD: 0 % (ref 0–2)
BDY SITE: ABNORMAL
BILIRUB SERPL-MCNC: 0.5 MG/DL (ref 0.2–1.1)
BUN SERPL-MCNC: 18 MG/DL (ref 8–23)
CALCIUM SERPL-MCNC: 9.3 MG/DL (ref 8.3–10.4)
CALCULATED P AXIS, ECG09: 52 DEGREES
CALCULATED R AXIS, ECG10: 35 DEGREES
CALCULATED T AXIS, ECG11: 44 DEGREES
CHLORIDE SERPL-SCNC: 108 MMOL/L (ref 98–107)
CO2 BLD-SCNC: 20 MMOL/L
CO2 SERPL-SCNC: 20 MMOL/L (ref 21–32)
COLLECT TIME,HTIME: 440
CREAT SERPL-MCNC: 1.48 MG/DL (ref 0.8–1.5)
DIAGNOSIS, 93000: NORMAL
DIFFERENTIAL METHOD BLD: ABNORMAL
EOSINOPHIL # BLD: 0.2 K/UL (ref 0–0.8)
EOSINOPHIL NFR BLD: 3 % (ref 0.5–7.8)
ERYTHROCYTE [DISTWIDTH] IN BLOOD BY AUTOMATED COUNT: 15 % (ref 11.9–14.6)
FLOW RATE ISTAT,IFRATE: 2 L/MIN
GAS FLOW.O2 O2 DELIVERY SYS: ABNORMAL L/MIN
GLOBULIN SER CALC-MCNC: 3.6 G/DL (ref 2.3–3.5)
GLUCOSE SERPL-MCNC: 133 MG/DL (ref 65–100)
HCO3 BLD-SCNC: 19.5 MMOL/L (ref 22–26)
HCT VFR BLD AUTO: 36 % (ref 41.1–50.3)
HGB BLD-MCNC: 11.5 G/DL (ref 13.6–17.2)
IMM GRANULOCYTES # BLD AUTO: 0 K/UL (ref 0–0.5)
IMM GRANULOCYTES NFR BLD AUTO: 0 % (ref 0–5)
LYMPHOCYTES # BLD: 1.9 K/UL (ref 0.5–4.6)
LYMPHOCYTES NFR BLD: 21 % (ref 13–44)
MCH RBC QN AUTO: 30.2 PG (ref 26.1–32.9)
MCHC RBC AUTO-ENTMCNC: 31.9 G/DL (ref 31.4–35)
MCV RBC AUTO: 94.5 FL (ref 79.6–97.8)
MONOCYTES # BLD: 0.9 K/UL (ref 0.1–1.3)
MONOCYTES NFR BLD: 10 % (ref 4–12)
NEUTS SEG # BLD: 5.9 K/UL (ref 1.7–8.2)
NEUTS SEG NFR BLD: 65 % (ref 43–78)
NRBC # BLD: 0 K/UL (ref 0–0.2)
O2/TOTAL GAS SETTING VFR VENT: 28 %
P-R INTERVAL, ECG05: 198 MS
PCO2 BLD: 33.1 MMHG (ref 35–45)
PH BLD: 7.38 [PH] (ref 7.35–7.45)
PLATELET # BLD AUTO: 271 K/UL (ref 150–450)
PMV BLD AUTO: 10.4 FL (ref 9.4–12.3)
PO2 BLD: 80 MMHG (ref 75–100)
POTASSIUM SERPL-SCNC: 4.2 MMOL/L (ref 3.5–5.1)
PROT SERPL-MCNC: 7 G/DL (ref 6.3–8.2)
Q-T INTERVAL, ECG07: 330 MS
QRS DURATION, ECG06: 96 MS
QTC CALCULATION (BEZET), ECG08: 414 MS
RBC # BLD AUTO: 3.81 M/UL (ref 4.23–5.6)
SAO2 % BLD: 96 % (ref 95–98)
SERVICE CMNT-IMP: ABNORMAL
SERVICE CMNT-IMP: ABNORMAL
SODIUM SERPL-SCNC: 140 MMOL/L (ref 136–145)
SPECIMEN TYPE: ABNORMAL
VENTRICULAR RATE, ECG03: 95 BPM
WBC # BLD AUTO: 9 K/UL (ref 4.3–11.1)

## 2020-07-30 PROCEDURE — 99285 EMERGENCY DEPT VISIT HI MDM: CPT

## 2020-07-30 PROCEDURE — 74011000258 HC RX REV CODE- 258

## 2020-07-30 PROCEDURE — 80053 COMPREHEN METABOLIC PANEL: CPT

## 2020-07-30 PROCEDURE — 82803 BLOOD GASES ANY COMBINATION: CPT

## 2020-07-30 PROCEDURE — 74011250636 HC RX REV CODE- 250/636

## 2020-07-30 PROCEDURE — 36600 WITHDRAWAL OF ARTERIAL BLOOD: CPT

## 2020-07-30 PROCEDURE — 85025 COMPLETE CBC W/AUTO DIFF WBC: CPT

## 2020-07-30 PROCEDURE — 71045 X-RAY EXAM CHEST 1 VIEW: CPT

## 2020-07-30 PROCEDURE — 70450 CT HEAD/BRAIN W/O DYE: CPT

## 2020-07-30 PROCEDURE — 93005 ELECTROCARDIOGRAM TRACING: CPT

## 2020-07-30 PROCEDURE — 96374 THER/PROPH/DIAG INJ IV PUSH: CPT

## 2020-07-30 RX ORDER — LEVETIRACETAM 500 MG/1
500 TABLET ORAL 2 TIMES DAILY
Qty: 30 TAB | Refills: 0 | Status: SHIPPED | OUTPATIENT
Start: 2020-07-30

## 2020-07-30 RX ADMIN — SODIUM CHLORIDE 1000 MG: 900 INJECTION, SOLUTION INTRAVENOUS at 06:46

## 2020-07-30 NOTE — ED PROVIDER NOTES
45-year-old male brought in by EMS for seizure activity. Wife states that he got up to go the bathroom when he came back to the bed he sat down and made a noise in his arm started to \"spasm over his head\". Then he had a tonic-clonic seizure. Patient had this 4 weeks ago and was worked up at [facility] is currently being seen by Dr. Jazlyn Malone and neurologist.  The working theory is that his oxygen level drops at night which can cause him to have a seizure. He has not put him on any anticonvulsants at this time. He has had an EEG and had abnormal findings. There was no fall tonight did not strike his head. Seizure    This is a recurrent problem. The current episode started less than 1 hour ago. There was 1 seizure. The most recent episode lasted 30 to 120 seconds. Pertinent negatives include no confusion and no chest pain. Characteristics include eye blinking, rhythmic jerking and loss of consciousness. The episode was witnessed. There was no return to baseline postseizure. Possible causes do not include medication or dosage change. There has been no fever. He reports no chest pain, no confusion.  There were no medications administered prior to arrival.        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    CAD (coronary artery disease)     Coronary atherosclerosis of native coronary artery 11/2/2015    Dyspnea/Shortness of breath 11/2/2015    Elevated prostate specific antigen (PSA)     Esophageal reflux     Extremity atherosclerosis with intermittent claudication ([facility] [address] 11/2/2015    Heart disease, unspecified     Hyperlipidemia 11/2/2015    Hypertrophy of prostate with urinary obstruction and other lower urinary tract symptoms (LUTS)     Pure hypercholesterolemia        Past Surgical History:   Procedure Laterality Date    HX CHOLECYSTECTOMY      HX CORONARY ARTERY BYPASS GRAFT      HX KNEE REPLACEMENT Left     total    HX ORTHOPAEDIC knee surgury         Family History:   Problem Relation Age of Onset    Cancer Mother     Cancer Father     Heart Disease Father     Elevated Lipids Father        Social History     Socioeconomic History    Marital status:      Spouse name: Not on file    Number of children: Not on file    Years of education: Not on file    Highest education level: Not on file   Occupational History    Not on file   Social Needs    Financial resource strain: Not on file    Food insecurity     Worry: Not on file     Inability: Not on file    Transportation needs     Medical: Not on file     Non-medical: Not on file   Tobacco Use    Smoking status: Former Smoker     Last attempt to quit: 1972     Years since quittin.6    Smokeless tobacco: Never Used   Substance and Sexual Activity    Alcohol use: No    Drug use: Not on file    Sexual activity: Not on file   Lifestyle    Physical activity     Days per week: Not on file     Minutes per session: Not on file    Stress: Not on file   Relationships    Social connections     Talks on phone: Not on file     Gets together: Not on file     Attends Rastafarian service: Not on file     Active member of club or organization: Not on file     Attends meetings of clubs or organizations: Not on file     Relationship status: Not on file    Intimate partner violence     Fear of current or ex partner: Not on file     Emotionally abused: Not on file     Physically abused: Not on file     Forced sexual activity: Not on file   Other Topics Concern    Not on file   Social History Narrative    Not on file         ALLERGIES: Phenergan [promethazine] and Codeine    Review of Systems   Constitutional: Negative. Negative for activity change. HENT: Negative. Eyes: Negative. Respiratory: Negative. Cardiovascular: Negative. Negative for chest pain. Gastrointestinal: Negative. Genitourinary: Negative. Musculoskeletal: Negative. Skin: Negative. Neurological: Positive for loss of consciousness. Psychiatric/Behavioral: Negative. Negative for confusion. All other systems reviewed and are negative. Vitals:    07/30/20 0421   BP: 132/68   Pulse: 97   Resp: 14   Temp: 97.8 °F (36.6 °C)   SpO2: 100%   Weight: 94.8 kg (209 lb)   Height: 5' 8\" (1.727 m)            Physical Exam  Vitals signs and nursing note reviewed. Constitutional:       General: He is not in acute distress. Appearance: He is well-developed. He is not diaphoretic. HENT:      Head: Normocephalic and atraumatic. Right Ear: External ear normal.      Left Ear: External ear normal.      Nose: Nose normal.      Mouth/Throat:      Pharynx: No oropharyngeal exudate. Eyes:      General: No scleral icterus. Right eye: No discharge. Left eye: No discharge. Conjunctiva/sclera: Conjunctivae normal.      Pupils: Pupils are equal, round, and reactive to light. Neck:      Musculoskeletal: Normal range of motion and neck supple. Vascular: No JVD. Trachea: No tracheal deviation. Cardiovascular:      Rate and Rhythm: Normal rate and regular rhythm. Pulmonary:      Effort: Pulmonary effort is normal. No respiratory distress. Breath sounds: Normal breath sounds. No stridor. No wheezing. Chest:      Chest wall: No tenderness. Abdominal:      General: Bowel sounds are normal. There is no distension. Palpations: Abdomen is soft. There is no mass. Tenderness: There is no abdominal tenderness. Musculoskeletal: Normal range of motion. General: No tenderness. Skin:     General: Skin is warm and dry. Coloration: Skin is not pale. Findings: No erythema or rash. Neurological:      Mental Status: He is alert and oriented to person, place, and time. Cranial Nerves: No cranial nerve deficit. Psychiatric:         Behavior: Behavior normal.         Thought Content:  Thought content normal.          MDM  Number of Diagnoses or Management Options  Diagnosis management comments: Patient had 1 seizure with a similar to a seizure 4 weeks ago. Patient returned to his baseline following commands. Patient is back to his baseline. Patient does not have any orthostatic changes. Patient's SaO2 remained above 95% on room air. Patient's had a work-up for the seizures 4 weeks ago and is being followed by neurology at [facility]. He was loaded with Keppra in the ED and will be given a Keppra prescription until he can get a hold of his neurologist for further instructions. He is to follow the usual seizure precautions and restricting from driving or operating heavy equipment.        Amount and/or Complexity of Data Reviewed  Clinical lab tests: ordered and reviewed  Tests in the radiology section of CPT®: ordered and reviewed  Tests in the medicine section of CPT®: ordered and reviewed  Decide to obtain previous medical records or to obtain history from someone other than the patient: yes    Risk of Complications, Morbidity, and/or Mortality  Presenting problems: high  Diagnostic procedures: high  Management options: high    Patient Progress  Patient progress: stable         Procedures

## 2020-07-30 NOTE — ED NOTES

## 2020-07-30 NOTE — DISCHARGE INSTRUCTIONS
Patient Education        Seizure: Care Instructions  Your Care Instructions     Seizures are caused by abnormal patterns of electrical signals in the brain. They are different for each person. Seizures can affect movement, speech, vision, or awareness. Some people have only slight shaking of a hand and do not pass out. Other people may pass out and have violent shaking of the whole body. Some people appear to stare into space. They are awake, but they can't respond normally. Later, they may not remember what happened. You may need tests to identify the type and cause of the seizures. A seizure may occur only once, or you may have them more than one time. Taking medicines as directed and following up with your doctor may help keep you from having more seizures. The doctor has checked you carefully, but problems can develop later. If you notice any problems or new symptoms, get medical treatment right away. Follow-up care is a key part of your treatment and safety. Be sure to make and go to all appointments, and call your doctor if you are having problems. It's also a good idea to know your test results and keep a list of the medicines you take. How can you care for yourself at home? · Be safe with medicines. Take your medicines exactly as prescribed. Call your doctor if you think you are having a problem with your medicine. · Do not do any activity that could be dangerous to you or others until your doctor says it is safe to do so. For example, do not drive a car, operate machinery, swim, or climb ladders. · Be sure that anyone treating you for any health problem knows that you have had a seizure and what medicines you are taking for it. · Identify and avoid things that may make you more likely to have a seizure. These may include lack of sleep, alcohol or drug use, stress, or not eating. · Make sure you go to your follow-up appointment. When should you call for help?    [id number] anytime you think you may need emergency care. For example, call if:  · You have another seizure. · You have new symptoms, such as trouble walking, speaking, or thinking clearly. Call your doctor now or seek immediate medical care if:  · You are not acting normally. Watch closely for changes in your health, and be sure to contact your doctor if you have any problems. Where can you learn more? Go to http://gemma-emmy.info/  Enter M769 in the search box to learn more about \"Seizure: Care Instructions. \"  Current as of: November 20, 2019               Content Version: 12.5  © 7913-7298 [employer]. Care instructions adapted under license by [employer] (which disclaims liability or warranty for this information). If you have questions about a medical condition or this instruction, always ask your healthcare professional. [address] any warranty or liability for your use of this information.

## 2020-07-30 NOTE — ED TRIAGE NOTES
Patient arrives via EMS from home for possible seizure. EMS states patient was seen here recently for a similar issue. EMS states that patient initially was unresponsive but has slowly come around. Patient arrives alert to voice.  Patient able to answer questions appropriately

## 2021-02-16 ENCOUNTER — HOSPITAL ENCOUNTER (OUTPATIENT)
Dept: GENERAL RADIOLOGY | Age: 81
Discharge: HOME | End: 2021-02-16
Payer: MEDICARE

## 2021-02-16 DIAGNOSIS — R06.09 DOE (DYSPNEA ON EXERTION): ICD-10-CM

## 2021-02-16 PROCEDURE — 71046 X-RAY EXAM CHEST 2 VIEWS: CPT

## 2021-04-15 ENCOUNTER — HOSPITAL ENCOUNTER (OUTPATIENT)
Dept: SLEEP MEDICINE | Age: 81
Discharge: HOME | End: 2021-04-15
Payer: MEDICARE

## 2021-04-15 PROCEDURE — 95811 POLYSOM 6/>YRS CPAP 4/> PARM: CPT

## 2021-05-18 ENCOUNTER — HOSPITAL ENCOUNTER (OUTPATIENT)
Dept: SLEEP MEDICINE | Age: 81
Discharge: HOME | End: 2021-05-18
Payer: MEDICARE

## 2021-05-18 PROCEDURE — 95811 POLYSOM 6/>YRS CPAP 4/> PARM: CPT

## 2021-07-09 PROBLEM — G47.33 OSA (OBSTRUCTIVE SLEEP APNEA): Status: ACTIVE | Noted: 2021-07-09

## 2021-07-09 PROBLEM — G47.31 COMPLEX SLEEP APNEA SYNDROME: Status: ACTIVE | Noted: 2021-07-09

## 2021-11-08 PROBLEM — R42 VERTIGO: Status: ACTIVE | Noted: 2019-06-19

## 2021-11-08 PROBLEM — E11.51 TYPE 2 DIABETES MELLITUS WITH DIABETIC PERIPHERAL ANGIOPATHY WITHOUT GANGRENE, WITHOUT LONG-TERM CURRENT USE OF INSULIN (HCC): Status: ACTIVE | Noted: 2019-05-17

## 2021-11-08 PROBLEM — K21.9 GERD (GASTROESOPHAGEAL REFLUX DISEASE): Status: ACTIVE | Noted: 2021-11-08

## 2021-11-08 PROBLEM — N30.00 ACUTE CYSTITIS: Status: ACTIVE | Noted: 2017-10-09

## 2021-11-08 PROBLEM — K22.2 ESOPHAGEAL STENOSIS: Status: ACTIVE | Noted: 2021-11-08

## 2021-11-08 PROBLEM — R11.2 NAUSEA AND VOMITING: Status: ACTIVE | Noted: 2019-06-19

## 2021-11-08 PROBLEM — D12.6 BENIGN NEOPLASM OF COLON: Status: ACTIVE | Noted: 2021-11-08

## 2021-11-08 PROBLEM — R42 ORTHOSTATIC LIGHTHEADEDNESS: Status: ACTIVE | Noted: 2019-06-19

## 2021-11-08 PROBLEM — E78.2 MIXED HYPERLIPIDEMIA: Status: ACTIVE | Noted: 2021-11-08

## 2021-11-08 PROBLEM — R40.20 LOSS OF CONSCIOUSNESS (HCC): Status: ACTIVE | Noted: 2020-06-30

## 2021-11-08 PROBLEM — R53.83 FATIGUE: Status: ACTIVE | Noted: 2017-10-09

## 2021-11-08 PROBLEM — G31.84 MILD COGNITIVE IMPAIRMENT: Status: ACTIVE | Noted: 2021-09-15

## 2021-11-08 PROBLEM — R11.15 NON-INTRACTABLE CYCLICAL VOMITING WITH NAUSEA: Status: ACTIVE | Noted: 2019-06-20

## 2021-11-08 PROBLEM — M19.90 ARTHRITIS: Status: ACTIVE | Noted: 2021-11-08

## 2021-11-30 ENCOUNTER — HOSPITAL ENCOUNTER (EMERGENCY)
Age: 81
Discharge: HOME | End: 2021-11-30
Attending: EMERGENCY MEDICINE
Payer: MEDICARE

## 2021-11-30 ENCOUNTER — APPOINTMENT (OUTPATIENT)
Dept: GENERAL RADIOLOGY | Age: 81
End: 2021-11-30
Attending: EMERGENCY MEDICINE
Payer: MEDICARE

## 2021-11-30 VITALS
HEART RATE: 80 BPM | RESPIRATION RATE: 16 BRPM | SYSTOLIC BLOOD PRESSURE: 120 MMHG | DIASTOLIC BLOOD PRESSURE: 78 MMHG | OXYGEN SATURATION: 99 % | TEMPERATURE: 98.6 F

## 2021-11-30 DIAGNOSIS — S42.351A CLOSED DISPLACED COMMINUTED FRACTURE OF SHAFT OF RIGHT HUMERUS, INITIAL ENCOUNTER: Primary | ICD-10-CM

## 2021-11-30 PROCEDURE — 74011250637 HC RX REV CODE- 250/637: Performed by: NURSE PRACTITIONER

## 2021-11-30 PROCEDURE — 75810000053 HC SPLINT APPLICATION

## 2021-11-30 PROCEDURE — 99284 EMERGENCY DEPT VISIT MOD MDM: CPT

## 2021-11-30 PROCEDURE — 73060 X-RAY EXAM OF HUMERUS: CPT

## 2021-11-30 PROCEDURE — 73030 X-RAY EXAM OF SHOULDER: CPT

## 2021-11-30 RX ORDER — OXYCODONE AND ACETAMINOPHEN 5; 325 MG/1; MG/1
1 TABLET ORAL
Qty: 12 TABLET | Refills: 0 | Status: SHIPPED | OUTPATIENT
Start: 2021-11-30 | End: 2021-12-03

## 2021-11-30 RX ORDER — OXYCODONE AND ACETAMINOPHEN 5; 325 MG/1; MG/1
1 TABLET ORAL
Status: COMPLETED | OUTPATIENT
Start: 2021-11-30 | End: 2021-11-30

## 2021-11-30 RX ADMIN — OXYCODONE HYDROCHLORIDE AND ACETAMINOPHEN 1 TABLET: 5; 325 TABLET ORAL at 17:24

## 2021-11-30 NOTE — ED PROVIDER NOTES
81-year-old male presents emergency department today after a fall. Patient states that he was taking the trash out when he tripped over a stump in the yard. He reports trying to brace himself on his right arm. He reports pain in his right upper arm and shoulder. He denies head injury or loss of consciousness. He denies any neck or back pain. Pain is worse with movement of his arm. The history is provided by the patient. Arm Pain  This is a new problem. The current episode started 1 to 2 hours ago. The problem has not changed since onset. Pertinent negatives include no chest pain, no abdominal pain, no headaches and no shortness of breath.  The symptoms are relieved by rest.        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    CAD (coronary artery disease)     Coronary atherosclerosis of native coronary artery 11/2/2015    Dyspnea/Shortness of breath 11/2/2015    Elevated prostate specific antigen (PSA)     Esophageal reflux     Extremity atherosclerosis with intermittent claudication ([facility] [address] 11/2/2015    Heart disease, unspecified     Hyperlipidemia 11/2/2015    Hypertrophy of prostate with urinary obstruction and other lower urinary tract symptoms (LUTS)     Pure hypercholesterolemia        Past Surgical History:   Procedure Laterality Date    HX CHOLECYSTECTOMY      HX CORONARY ARTERY BYPASS GRAFT      HX KNEE REPLACEMENT Left     total    HX ORTHOPAEDIC      knee surgury         Family History:   Problem Relation Age of Onset    Cancer Mother     Cancer Father     Heart Disease Father     Elevated Lipids Father        Social History     Socioeconomic History    Marital status:      Spouse name: Not on file    Number of children: Not on file    Years of education: Not on file    Highest education level: Not on file   Occupational History    Not on file   Tobacco Use    Smoking status: Former Smoker     Packs/day: 2.00 Years: 15.00     Pack years: 30.00     Quit date: 1972     Years since quittin.9    Smokeless tobacco: Never Used   Substance and Sexual Activity    Alcohol use: No    Drug use: Not on file    Sexual activity: Not on file   Other Topics Concern    Not on file   Social History Narrative    Not on file     Social Determinants of Health     Financial Resource Strain:     Difficulty of Paying Living Expenses: Not on file   Food Insecurity:     Worried About Running Out of Food in the Last Year: Not on file    Uriel of Food in the Last Year: Not on file   Transportation Needs:     Lack of Transportation (Medical): Not on file    Lack of Transportation (Non-Medical): Not on file   Physical Activity:     Days of Exercise per Week: Not on file    Minutes of Exercise per Session: Not on file   Stress:     Feeling of Stress : Not on file   Social Connections:     Frequency of Communication with Friends and Family: Not on file    Frequency of Social Gatherings with Friends and Family: Not on file    Attends Synagogue Services: Not on file    Active Member of [address] [place] [address] or Organizations: Not on file    Attends Club or Organization Meetings: Not on file    Marital Status: Not on file   Intimate Partner Violence:     Fear of Current or Ex-Partner: Not on file    Emotionally Abused: Not on file    Physically Abused: Not on file    Sexually Abused: Not on file   Housing Stability:     Unable to Pay for Housing in the Last Year: Not on file    Number of [place] in the Last Year: Not on file    Unstable Housing in the Last Year: Not on file         ALLERGIES: Phenergan [promethazine] and Codeine    Review of Systems   Constitutional: Negative for fever. Respiratory: Negative for shortness of breath. Cardiovascular: Negative for chest pain. Gastrointestinal: Negative for abdominal pain. Musculoskeletal: Negative for back pain and neck pain.         Right arm and shoulder pain Neurological: Negative for dizziness, syncope and headaches. All other systems reviewed and are negative. Vitals:    11/30/21 1622 11/30/21 1937   BP: 114/64 120/78   Pulse: 82 80   Resp: 16 16   Temp: 98.6 °F (37 °C)    SpO2: 98% 99%            Physical Exam  Vitals and nursing note reviewed. Constitutional:       General: He is not in acute distress. Appearance: Normal appearance. He is not ill-appearing, toxic-appearing or diaphoretic. HENT:      Head: Normocephalic and atraumatic. Right Ear: External ear normal.      Left Ear: External ear normal.      Mouth/Throat:      Mouth: Mucous membranes are moist.      Pharynx: Oropharynx is clear. Eyes:      General: No scleral icterus. Extraocular Movements: Extraocular movements intact. Conjunctiva/sclera: Conjunctivae normal.   Cardiovascular:      Rate and Rhythm: Normal rate. Pulses: Normal pulses. Radial pulses are 2+ on the right side and 2+ on the left side. Heart sounds: Normal heart sounds. Pulmonary:      Effort: Pulmonary effort is normal. No respiratory distress. Breath sounds: Normal breath sounds. Abdominal:      General: Abdomen is flat. There is no distension. Musculoskeletal:      Right shoulder: Tenderness and bony tenderness present. No swelling. Decreased range of motion. Normal strength. Left shoulder: Normal.      Right upper arm: Tenderness and bony tenderness present. Right elbow: Normal. No tenderness. Left elbow: Normal. No tenderness. Right forearm: Normal.      Left forearm: Normal.      Right wrist: Normal. No tenderness, bony tenderness or snuff box tenderness. Left wrist: Normal.      Right hand: Normal. No tenderness. Normal range of motion. Normal sensation. Normal capillary refill. Normal pulse. Left hand: Normal.      Cervical back: Normal, normal range of motion and neck supple. No rigidity.       Thoracic back: Normal.      Lumbar back: Normal.      Right hip: Normal.      Left hip: Normal.      Right knee: Normal.      Left knee: Normal.      Right lower leg: No edema. Left lower leg: No edema. Comments: Tenderness with palpation to right upper arm into right shoulder. Range of motion is decreased secondary to pain. Difficult to determine if there is deformity as patient has EMS splint on RUE. Patient has good, equal radial pulses bilaterally. No tenderness with palpation of bilateral hips or knees. Patient exhibits full range of motion of bilateral hips and knees. No midline tenderness with palpation of cervical, thoracic, or lumbar spine. Skin:     General: Skin is warm and dry. Capillary Refill: Capillary refill takes less than 2 seconds. Findings: No wound. Neurological:      General: No focal deficit present. Mental Status: He is alert and oriented to person, place, and time. Psychiatric:         Mood and Affect: Mood normal.         Behavior: Behavior normal.         Thought Content: Thought content normal.         Judgment: Judgment normal.          MDM  Number of Diagnoses or Management Options  Closed displaced comminuted fracture of shaft of right humerus, initial encounter: new and requires workup  Diagnosis management comments: 28-year-old male presents emergency department today after a mechanical fall. Patient reports right arm pain. Comminuted fracture of right humerus noted on x-ray. No neurovascular abnormalities noted on exam.  Patient has good radial pulses. Sensation is intact to his right arm and hand. No open areas of skin noted on exam.  Spoke with the NP with orthopedics who stated to place patient in a splint and sling. Patient placed in posterior long-arm splint and sling. Neurovascular status was reexamined after placement of splint. No abnormalities noted. Patient is able to wiggle his fingers. He has good capillary refill in all digits. Skin is warm and pink.   Patient educated on splint care. Patient is to follow-up with Dr. Regla Spangler tomorrow in the office. He is aware that they need to call the office in the morning to find out what time they need to report to the office. We will provide him with a prescription for pain medication. I have discussed the results of all labs, procedures, radiographs, and/or treatments with the patient and available family members. Maris Goodwin is agreed upon by the patient and the patient is ready for discharge.  Questions about treatment in the ED and differential diagnosis of presenting condition were answered. Pop Barrett was given verbal discharge instructions including, but not limited to, importance of returning to the emergency department for any concern of worsening or continued symptoms.  Instructions were given to follow up with a primary care provider or specialist within 1-2 days. Vanice Carlota effects of medications, if prescribed, were discussed and patient was advised to refrain from significant physical activity until followed up by primary care physician and to not drive or operate heavy machinery after taking any sedating substances.      Gabriel Barnes NP; 11/30/2021 @8:13 PM Voice dictation software was used during the making of  this note. This software is not perfect and grammatical and other typographical errors  may be present. This note has not been proofread for errors. Amount and/or Complexity of Data Reviewed  Tests in the radiology section of CPT®: reviewed    Risk of Complications, Morbidity, and/or Mortality  Presenting problems: moderate  Diagnostic procedures: moderate  Management options: moderate    Patient Progress  Patient progress: improved    ED Course as of 11/30/21 2013 Tue Nov 30, 2021   1811 XR HUMERUS RT  FINDINGS:   Right shoulder: There is an acute, comminuted spiral fracture of the mid humeral  diaphysis with posterior and lateral displacement of the distal fragments.  There  is a nondisplaced component that extends into the humeral head and neck. The  humeral head remains seated in the glenoid fossa with moderate glenohumeral and  AC joint degenerative changes. Osteopenia.     Right humerus: Fracture as described. Osteopenia. Soft tissues are grossly  normal.        IMPRESSION  Acute comminuted displaced spiral fracture of the mid humeral diaphysis with  nondisplaced components extending into the humeral head and neck. [BC]   1819 Perfect Serve message to orthopedics. [BC]      ED Course User Index  [BC] Princess Jose NP       Splint, Long Arm    Date/Time: 11/30/2021 8:10 PM  Performed by: Princess Jose NP  Authorized by: Princess Jose NP     Consent:     Consent obtained:  Verbal    Consent given by:  Patient    Risks discussed:  Discoloration, numbness, pain and swelling  Pre-procedure details:     Sensation:  Normal    Skin color:  Appropriate for ethnicity  Procedure details:     Laterality:  Right    Location:  Arm    Splint type:  Long arm    Supplies:  Elastic bandage, Ortho-Glass and cotton padding  Post-procedure details:     Pain:  Improved    Sensation:  Normal    Skin color:  Appropriate for ethnicity    Patient tolerance of procedure:   Tolerated well, no immediate complications

## 2021-11-30 NOTE — ED TRIAGE NOTES
Pt arrives per EMS after a trip and fall. Pain to right arm. Possible deformity per EMS. Arm is splinted by EMS. PMS intact.

## 2021-12-01 NOTE — DISCHARGE INSTRUCTIONS
Take medication as prescribed. Use this medication with caution as it is a narcotic. Apply ice pack for 15 to 20 minutes every 1-2 hours. Do not get the splint wet. Call Dr. Chloé Castro office in the morning to find out what time you need to go to their office. Return to the emergency department for any new, worsening, or concerning symptoms.

## 2021-12-01 NOTE — ED NOTES
I have reviewed discharge instructions with the patient. The patient verbalized understanding. Patient left ED via Discharge Method: ambulatory to Home with wife. Opportunity for questions and clarification provided. Patient given 1 scripts. To continue your aftercare when you leave the hospital, you may receive an automated call from our care team to check in on how you are doing. This is a free service and part of our promise to provide the best care and service to meet your aftercare needs.  If you have questions, or wish to unsubscribe from this service please call [phone]. Thank you for Choosing our [facility] Emergency Department.

## 2021-12-02 ENCOUNTER — ANESTHESIA EVENT (OUTPATIENT)
Dept: SURGERY | Age: 81
End: 2021-12-02
Payer: MEDICARE

## 2021-12-02 NOTE — H&P (VIEW-ONLY)
History and physical    Patient: Judy Lawrence MRN: [id number]  SSN: [id number]    YOB: 1940  Age: [de-identified] y.o. Sex: male      Subjective:      Judy Lawrence is a [de-identified] y.o. male who fell and injured his right shoulder. He does have a rather extensive past medical history. He has a history of coronary artery disease has had multiple stents and a coronary artery bypass graft surgery in the past.  I try to ask him about whether or not he is having any chest pain it does not sound he fully having any chest pain or symptoms now. He reports he just saw his cardiologist in the last few weeks and was doing well and seemed to get a good report at that time. It appears that his last visit was in July. He has also seen the pulmonologist recently as he does have a history of some sleep apnea and it appears he has some history of asthma as well. He denies any other problems besides his right shoulder. No problems of the left upper extremity or bilateral lower extremities. He has done well in the past with anesthesia with his prior surgeries. .    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    CAD (coronary artery disease)     Coronary atherosclerosis of native coronary artery 11/2/2015    Dyspnea/Shortness of breath 11/2/2015    Elevated prostate specific antigen (PSA)     Esophageal reflux     Extremity atherosclerosis with intermittent claudication ([facility] [address] 11/2/2015    Heart disease, unspecified     Hyperlipidemia 11/2/2015    Hypertrophy of prostate with urinary obstruction and other lower urinary tract symptoms (LUTS)     Pure hypercholesterolemia      Past Surgical History:   Procedure Laterality Date    HX CHOLECYSTECTOMY      HX CORONARY ARTERY BYPASS GRAFT      HX KNEE REPLACEMENT Left     total    HX ORTHOPAEDIC      knee surgury      FAMHX -No history of inflammatory arthritis   Social History     Tobacco Use    Smoking status: Former Smoker     Packs/day: 2.00     Years: 15.00     Pack years: 30.00     Quit date: 1972     Years since quittin.9    Smokeless tobacco: Never Used   Substance Use Topics    Alcohol use: No      Current Outpatient Medications   Medication Sig Dispense Refill    oxyCODONE-acetaminophen (Percocet) 5-325 mg per tablet Take 1 Tablet by mouth every six (6) hours as needed for Pain for up to 3 days. Max Daily Amount: 4 Tablets. 12 Tablet 0    benzonatate (TESSALON) 100 mg capsule Take 100 mg by mouth every six (6) hours as needed.  predniSONE (DELTASONE) 20 mg tablet Take 2 tabs daily x 3 days, take 1.5 tabs daily x 3 days, take 1 tab daily x 3 days, take 1/2 tab daily x 3 days (Patient not taking: Reported on 2021) 15 Tablet 0    Cetirizine (ZyrTEC) 10 mg cap Take  by mouth.  fluticasone furoate-vilanteroL (Breo Ellipta) 200-25 mcg/dose inhaler Take 1 Puff by inhalation daily. 1 Inhaler 11    levETIRAcetam (Keppra) 500 mg tablet Take 1 Tab by mouth two (2) times a day. 30 Tab 0    CALCIUM CARBONATE, BULK, Take  by mouth.  coenzyme q10 (ULTRA COQ10) 75 mg cap Take 400 mg by mouth daily. Pt states taking the 400 mg      b complex vitamins tablet Take 1 Tab by mouth daily.  pantoprazole (PROTONIX) 40 mg tablet Take 40 mg by mouth daily.  atorvastatin (LIPITOR) 80 mg tablet Take 80 mg by mouth daily.  aspirin 81 mg chewable tablet Take 81 mg by mouth daily. Allergies   Allergen Reactions    Phenergan [Promethazine] Anaphylaxis    Codeine Nausea and Vomiting       Review of Systems:  A comprehensive review of systems was negative except for that written in the History of Present Illness. Objective: There were no vitals filed for this visit. Physical Exam:  Physical Exam:  General:  Alert, cooperative, no distress, appears stated age. Orientation he is alert and oriented to person place time and situation   Eyes:  Conjunctivae/corneas clear. PERRL, EOMs intact. Fundi benign   Ears:  Normal TMs and external ear canals both ears. Nose: Nares normal. Septum midline. Mucosa normal. No drainage or sinus tenderness. Mouth/Throat: Lips, mucosa, and tongue normal. Teeth and gums normal.   Neck: Supple, symmetrical, trachea midline, no adenopathy, thyroid: no enlargment/tenderness/nodules, no carotid bruit and no JVD. Back:   Symmetric, no curvature. ROM normal. No CVA tenderness. Lungs:   Clear to auscultation bilaterally. Heart:  Regular rate and rhythm, S1, S2 normal, no murmur, click, rub or gallop. Abdomen:   Soft, non-tender. Bowel sounds normal. No masses,  No organomegaly. No lymphadenopathy in all 4 extremities  Alignmenthe does have obvious deformity of his right arm  Range of motionpain with any range of motion of right shoulder or right elbow  Vasculardistal pulses palpable in right upper extremity  Sensory/motordeep tendon reflexes normal right upper extremity. Motor and sensory function intact. Stabilityvery difficult to assess stability because of his known fracture in his right arm  Tenderness to palpation throughout the right arm area  Skinno open wounds over his right upper extremity  Gait-near normal gait    Assessment:     Hospital Problems  Date Reviewed: 11/2/2021    None        Closed displaced midshaft comminuted right humeral shaft fracture    Xrays and or studies:    X-rays show a displaced comminuted right humeral midshaft fracture  Plan:     I have spoken with the patient at length regarding treatment options. I try to make sure he understands that the optimal treatment for his fracture I think would definitely be surgical intervention however he does have a very extensive past medical history. He is anxious to proceed with surgery but I tried to spend quite a bit of time making sure he understands how significant his risk is.   I talked him about the fact that hopefully we can do this with a regional anesthetic however I try to make sure he understands that with his history of some pulmonary problems that this is not without risk as sometimes the regional anesthetic and I suspect the nerves that involve his diaphragm and this could be a issue with him as well as general anesthesia could be an issue. Obviously the anesthesia team will be the one that knows these risk and how best to proceed with his operative intervention. I do think that this likely would be best if we could treat this with regional anesthetic and avoid any sort of general anesthetic. I am concerned with the degree of displacement he has that he is a very high risk for nonunion if we would treat this nonoperatively. The other thing is the fact that he take some medications that would also significantly impact his ability to heal his fracture and this really is pertinent whether we treat this nonoperatively or operatively. So after having this discussion with him and his wife at length they think they would like to go ahead and proceed with open treatment of right humeral shaft fracture with intramedullary nail fixation.   We will plan on scheduling this as an outpatient this week and hopefully use regional anesthesia if the anesthesiologist thinks is appropriate    Signed By: Madi Blanca MD     December 2, 2021

## 2021-12-02 NOTE — H&P
History and physical    Patient: Josefina Marshall MRN: [id number]  SSN: [id number]    YOB: 1940  Age: [de-identified] y.o. Sex: male      Subjective:      Josefina Marshall is a [de-identified] y.o. male who fell and injured his right shoulder. He does have a rather extensive past medical history. He has a history of coronary artery disease has had multiple stents and a coronary artery bypass graft surgery in the past.  I try to ask him about whether or not he is having any chest pain it does not sound he fully having any chest pain or symptoms now. He reports he just saw his cardiologist in the last few weeks and was doing well and seemed to get a good report at that time. It appears that his last visit was in July. He has also seen the pulmonologist recently as he does have a history of some sleep apnea and it appears he has some history of asthma as well. He denies any other problems besides his right shoulder. No problems of the left upper extremity or bilateral lower extremities. He has done well in the past with anesthesia with his prior surgeries. .    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    CAD (coronary artery disease)     Coronary atherosclerosis of native coronary artery 11/2/2015    Dyspnea/Shortness of breath 11/2/2015    Elevated prostate specific antigen (PSA)     Esophageal reflux     Extremity atherosclerosis with intermittent claudication ([address] 11/2/2015    Heart disease, unspecified     Hyperlipidemia 11/2/2015    Hypertrophy of prostate with urinary obstruction and other lower urinary tract symptoms (LUTS)     Pure hypercholesterolemia      Past Surgical History:   Procedure Laterality Date    HX CHOLECYSTECTOMY      HX CORONARY ARTERY BYPASS GRAFT      HX KNEE REPLACEMENT Left     total    HX ORTHOPAEDIC      knee surgury      FAMHX -No history of inflammatory arthritis   Social History     Tobacco Use    Smoking status: Former Smoker     Packs/day: 2.00     Years: 15.00     Pack years: 30.00     Quit date: 1972     Years since quittin.9    Smokeless tobacco: Never Used   Substance Use Topics    Alcohol use: No      Current Outpatient Medications   Medication Sig Dispense Refill    oxyCODONE-acetaminophen (Percocet) 5-325 mg per tablet Take 1 Tablet by mouth every six (6) hours as needed for Pain for up to 3 days. Max Daily Amount: 4 Tablets. 12 Tablet 0    benzonatate (TESSALON) 100 mg capsule Take 100 mg by mouth every six (6) hours as needed.  predniSONE (DELTASONE) 20 mg tablet Take 2 tabs daily x 3 days, take 1.5 tabs daily x 3 days, take 1 tab daily x 3 days, take 1/2 tab daily x 3 days (Patient not taking: Reported on 2021) 15 Tablet 0    Cetirizine (ZyrTEC) 10 mg cap Take  by mouth.  fluticasone furoate-vilanteroL (Breo Ellipta) 200-25 mcg/dose inhaler Take 1 Puff by inhalation daily. 1 Inhaler 11    levETIRAcetam (Keppra) 500 mg tablet Take 1 Tab by mouth two (2) times a day. 30 Tab 0    CALCIUM CARBONATE, BULK, Take  by mouth.  coenzyme q10 (ULTRA COQ10) 75 mg cap Take 400 mg by mouth daily. Pt states taking the 400 mg      b complex vitamins tablet Take 1 Tab by mouth daily.  pantoprazole (PROTONIX) 40 mg tablet Take 40 mg by mouth daily.  atorvastatin (LIPITOR) 80 mg tablet Take 80 mg by mouth daily.  aspirin 81 mg chewable tablet Take 81 mg by mouth daily. Allergies   Allergen Reactions    Phenergan [Promethazine] Anaphylaxis    Codeine Nausea and Vomiting       Review of Systems:  A comprehensive review of systems was negative except for that written in the History of Present Illness. Objective: There were no vitals filed for this visit. Physical Exam:  Physical Exam:  General:  Alert, cooperative, no distress, appears stated age. Orientation he is alert and oriented to person place time and situation   Eyes:  Conjunctivae/corneas clear. PERRL, EOMs intact. Fundi benign   Ears:  Normal TMs and external ear canals both ears. Nose: Nares normal. Septum midline. Mucosa normal. No drainage or sinus tenderness. Mouth/Throat: Lips, mucosa, and tongue normal. Teeth and gums normal.   Neck: Supple, symmetrical, trachea midline, no adenopathy, thyroid: no enlargment/tenderness/nodules, no carotid bruit and no JVD. Back:   Symmetric, no curvature. ROM normal. No CVA tenderness. Lungs:   Clear to auscultation bilaterally. Heart:  Regular rate and rhythm, S1, S2 normal, no murmur, click, rub or gallop. Abdomen:   Soft, non-tender. Bowel sounds normal. No masses,  No organomegaly. No lymphadenopathy in all 4 extremities  Alignment-he does have obvious deformity of his right arm  Range of motion-pain with any range of motion of right shoulder or right elbow  Vascular-distal pulses palpable in right upper extremity  Sensory/motor-deep tendon reflexes normal right upper extremity. Motor and sensory function intact. Stability-very difficult to assess stability because of his known fracture in his right arm  Tenderness to palpation throughout the right arm area  Skin-no open wounds over his right upper extremity  Gait-near normal gait    Assessment:     Hospital Problems  Date Reviewed: 11/2/2021    None        Closed displaced midshaft comminuted right humeral shaft fracture    Xrays and or studies:    X-rays show a displaced comminuted right humeral midshaft fracture  Plan:     I have spoken with the patient at length regarding treatment options. I try to make sure he understands that the optimal treatment for his fracture I think would definitely be surgical intervention however he does have a very extensive past medical history. He is anxious to proceed with surgery but I tried to spend quite a bit of time making sure he understands how significant his risk is.   I talked him about the fact that hopefully we can do this with a regional anesthetic however I try to make sure he understands that with his history of some pulmonary problems that this is not without risk as sometimes the regional anesthetic and I suspect the nerves that involve his diaphragm and this could be a issue with him as well as general anesthesia could be an issue. Obviously the anesthesia team will be the one that knows these risk and how best to proceed with his operative intervention. I do think that this likely would be best if we could treat this with regional anesthetic and avoid any sort of general anesthetic. I am concerned with the degree of displacement he has that he is a very high risk for nonunion if we would treat this nonoperatively. The other thing is the fact that he take some medications that would also significantly impact his ability to heal his fracture and this really is pertinent whether we treat this nonoperatively or operatively. So after having this discussion with him and his wife at length they think they would like to go ahead and proceed with open treatment of right humeral shaft fracture with intramedullary nail fixation.   We will plan on scheduling this as an outpatient this week and hopefully use regional anesthesia if the anesthesiologist thinks is appropriate    Signed By: Madi Blanca MD     December 2, 2021

## 2021-12-03 ENCOUNTER — HOSPITAL ENCOUNTER (OUTPATIENT)
Dept: GENERAL RADIOLOGY | Age: 81
Setting detail: OUTPATIENT SURGERY
Discharge: HOME | End: 2021-12-03
Attending: ORTHOPAEDIC SURGERY
Payer: MEDICARE

## 2021-12-03 ENCOUNTER — APPOINTMENT (OUTPATIENT)
Dept: GENERAL RADIOLOGY | Age: 81
End: 2021-12-03
Attending: ORTHOPAEDIC SURGERY
Payer: MEDICARE

## 2021-12-03 ENCOUNTER — HOSPITAL ENCOUNTER (OUTPATIENT)
Age: 81
Setting detail: OUTPATIENT SURGERY
Discharge: HOME | End: 2021-12-03
Attending: ORTHOPAEDIC SURGERY | Admitting: ORTHOPAEDIC SURGERY
Payer: MEDICARE

## 2021-12-03 ENCOUNTER — ANESTHESIA (OUTPATIENT)
Dept: SURGERY | Age: 81
End: 2021-12-03
Payer: MEDICARE

## 2021-12-03 VITALS
DIASTOLIC BLOOD PRESSURE: 65 MMHG | TEMPERATURE: 97.5 F | OXYGEN SATURATION: 92 % | RESPIRATION RATE: 14 BRPM | HEIGHT: 70 IN | WEIGHT: 204 LBS | BODY MASS INDEX: 29.2 KG/M2 | SYSTOLIC BLOOD PRESSURE: 116 MMHG | HEART RATE: 71 BPM

## 2021-12-03 DIAGNOSIS — S42.341A CLOSED DISPLACED SPIRAL FRACTURE OF SHAFT OF RIGHT HUMERUS, INITIAL ENCOUNTER: Primary | ICD-10-CM

## 2021-12-03 LAB
ABO + RH BLD: NORMAL
BLOOD GROUP ANTIBODIES SERPL: NORMAL
COVID-19 RAPID TEST, COVR: NOT DETECTED
GLUCOSE BLD STRIP.AUTO-MCNC: 93 MG/DL (ref 65–100)
POTASSIUM BLD-SCNC: 4.2 MMOL/L (ref 3.5–5.1)
SERVICE CMNT-IMP: NORMAL
SOURCE, COVRS: NORMAL
SPECIMEN EXP DATE BLD: NORMAL

## 2021-12-03 PROCEDURE — 77030039425 HC BLD LARYNG TRULITE DISP TELE -A: Performed by: ANESTHESIOLOGY

## 2021-12-03 PROCEDURE — 76942 ECHO GUIDE FOR BIOPSY: CPT | Performed by: ORTHOPAEDIC SURGERY

## 2021-12-03 PROCEDURE — 77030003602 HC NDL NRV BLK BBMI -B: Performed by: ANESTHESIOLOGY

## 2021-12-03 PROCEDURE — 77030019908 HC STETH ESOPH SIMS -A: Performed by: ANESTHESIOLOGY

## 2021-12-03 PROCEDURE — 76060000033 HC ANESTHESIA 1 TO 1.5 HR: Performed by: ORTHOPAEDIC SURGERY

## 2021-12-03 PROCEDURE — 2709999900 HC NON-CHARGEABLE SUPPLY: Performed by: ORTHOPAEDIC SURGERY

## 2021-12-03 PROCEDURE — 74011250637 HC RX REV CODE- 250/637: Performed by: ORTHOPAEDIC SURGERY

## 2021-12-03 PROCEDURE — 77030016475 HC BIT DRL QC4 SYNT -C: Performed by: ORTHOPAEDIC SURGERY

## 2021-12-03 PROCEDURE — 24516 TX HUMRL SHAFT FX IMED IMPLT: CPT | Performed by: ORTHOPAEDIC SURGERY

## 2021-12-03 PROCEDURE — 77030037088 HC TUBE ENDOTRACH ORAL NSL COVD-A: Performed by: ANESTHESIOLOGY

## 2021-12-03 PROCEDURE — 73060 X-RAY EXAM OF HUMERUS: CPT

## 2021-12-03 PROCEDURE — 77030002933 HC SUT MCRYL J&J -A: Performed by: ORTHOPAEDIC SURGERY

## 2021-12-03 PROCEDURE — 74011250636 HC RX REV CODE- 250/636: Performed by: ORTHOPAEDIC SURGERY

## 2021-12-03 PROCEDURE — 74011250636 HC RX REV CODE- 250/636: Performed by: NURSE ANESTHETIST, CERTIFIED REGISTERED

## 2021-12-03 PROCEDURE — C1713 ANCHOR/SCREW BN/BN,TIS/BN: HCPCS | Performed by: ORTHOPAEDIC SURGERY

## 2021-12-03 PROCEDURE — 74011250636 HC RX REV CODE- 250/636: Performed by: ANESTHESIOLOGY

## 2021-12-03 PROCEDURE — 74011000250 HC RX REV CODE- 250: Performed by: NURSE ANESTHETIST, CERTIFIED REGISTERED

## 2021-12-03 PROCEDURE — 86901 BLOOD TYPING SEROLOGIC RH(D): CPT

## 2021-12-03 PROCEDURE — 74011250637 HC RX REV CODE- 250/637: Performed by: ANESTHESIOLOGY

## 2021-12-03 PROCEDURE — 82962 GLUCOSE BLOOD TEST: CPT

## 2021-12-03 PROCEDURE — 76010000161 HC OR TIME 1 TO 1.5 HR INTENSV-TIER 1: Performed by: ORTHOPAEDIC SURGERY

## 2021-12-03 PROCEDURE — 76010010054 HC POST OP PAIN BLOCK: Performed by: ORTHOPAEDIC SURGERY

## 2021-12-03 PROCEDURE — 76210000017 HC OR PH I REC 1.5 TO 2 HR: Performed by: ORTHOPAEDIC SURGERY

## 2021-12-03 PROCEDURE — 77030014599 HC RMR ROD GD DISP SYNT -C: Performed by: ORTHOPAEDIC SURGERY

## 2021-12-03 PROCEDURE — L3650 SO 8 ABD RESTRAINT PRE OTS: HCPCS | Performed by: ORTHOPAEDIC SURGERY

## 2021-12-03 PROCEDURE — 87635 SARS-COV-2 COVID-19 AMP PRB: CPT

## 2021-12-03 PROCEDURE — 77030008462 HC STPLR SKN PROX J&J -A: Performed by: ORTHOPAEDIC SURGERY

## 2021-12-03 PROCEDURE — 77030033090: Performed by: ORTHOPAEDIC SURGERY

## 2021-12-03 PROCEDURE — 74011000250 HC RX REV CODE- 250: Performed by: ANESTHESIOLOGY

## 2021-12-03 PROCEDURE — 84132 ASSAY OF SERUM POTASSIUM: CPT

## 2021-12-03 PROCEDURE — 76210000021 HC REC RM PH II 0.5 TO 1 HR: Performed by: ORTHOPAEDIC SURGERY

## 2021-12-03 PROCEDURE — 74011000250 HC RX REV CODE- 250: Performed by: ORTHOPAEDIC SURGERY

## 2021-12-03 PROCEDURE — 77030017016 HC DSG ANTIMIC BARR2 S&N -B: Performed by: ORTHOPAEDIC SURGERY

## 2021-12-03 DEVICE — K WIRE FIX L285MM DIA2.5MM S STL W/ TRCR PNT: Type: IMPLANTABLE DEVICE | Site: HUMERUS | Status: FUNCTIONAL

## 2021-12-03 DEVICE — SCREW BNE L26MM DIA4MM TIB BLU TI ST CANN LOK FULL THRD T25: Type: IMPLANTABLE DEVICE | Site: HUMERUS | Status: FUNCTIONAL

## 2021-12-03 DEVICE — IMPLANTABLE DEVICE: Type: IMPLANTABLE DEVICE | Site: HUMERUS | Status: FUNCTIONAL

## 2021-12-03 DEVICE — SCREW BNE L44MM DIA4MM TIB BLU TI ST CANN LOK FULL THRD T25: Type: IMPLANTABLE DEVICE | Site: HUMERUS | Status: FUNCTIONAL

## 2021-12-03 RX ORDER — SODIUM CHLORIDE, SODIUM LACTATE, POTASSIUM CHLORIDE, CALCIUM CHLORIDE 600; 310; 30; 20 MG/100ML; MG/100ML; MG/100ML; MG/100ML
75 INJECTION, SOLUTION INTRAVENOUS CONTINUOUS
Status: DISCONTINUED | OUTPATIENT
Start: 2021-12-03 | End: 2021-12-03 | Stop reason: HOSPADM

## 2021-12-03 RX ORDER — GUAIFENESIN 100 MG/5ML
81 LIQUID (ML) ORAL
Status: COMPLETED | OUTPATIENT
Start: 2021-12-03 | End: 2021-12-03

## 2021-12-03 RX ORDER — MIDAZOLAM HYDROCHLORIDE 1 MG/ML
2 INJECTION, SOLUTION INTRAMUSCULAR; INTRAVENOUS ONCE
Status: COMPLETED | OUTPATIENT
Start: 2021-12-03 | End: 2021-12-03

## 2021-12-03 RX ORDER — PROPOFOL 10 MG/ML
INJECTION, EMULSION INTRAVENOUS AS NEEDED
Status: DISCONTINUED | OUTPATIENT
Start: 2021-12-03 | End: 2021-12-03 | Stop reason: HOSPADM

## 2021-12-03 RX ORDER — CEFAZOLIN SODIUM/WATER 2 G/20 ML
2 SYRINGE (ML) INTRAVENOUS ONCE
Status: COMPLETED | OUTPATIENT
Start: 2021-12-03 | End: 2021-12-03

## 2021-12-03 RX ORDER — OXYCODONE AND ACETAMINOPHEN 5; 325 MG/1; MG/1
2 TABLET ORAL
Qty: 50 TABLET | Refills: 0 | Status: SHIPPED | OUTPATIENT
Start: 2021-12-03 | End: 2021-12-08

## 2021-12-03 RX ORDER — LIDOCAINE HYDROCHLORIDE AND EPINEPHRINE 10; 10 MG/ML; UG/ML
INJECTION, SOLUTION INFILTRATION; PERINEURAL AS NEEDED
Status: DISCONTINUED | OUTPATIENT
Start: 2021-12-03 | End: 2021-12-03 | Stop reason: HOSPADM

## 2021-12-03 RX ORDER — FENTANYL CITRATE 50 UG/ML
INJECTION, SOLUTION INTRAMUSCULAR; INTRAVENOUS AS NEEDED
Status: DISCONTINUED | OUTPATIENT
Start: 2021-12-03 | End: 2021-12-03 | Stop reason: HOSPADM

## 2021-12-03 RX ORDER — HYDROMORPHONE HYDROCHLORIDE 2 MG/ML
0.5 INJECTION, SOLUTION INTRAMUSCULAR; INTRAVENOUS; SUBCUTANEOUS
Status: DISCONTINUED | OUTPATIENT
Start: 2021-12-03 | End: 2021-12-03 | Stop reason: HOSPADM

## 2021-12-03 RX ORDER — DIPHENHYDRAMINE HYDROCHLORIDE 50 MG/ML
12.5 INJECTION, SOLUTION INTRAMUSCULAR; INTRAVENOUS
Status: DISCONTINUED | OUTPATIENT
Start: 2021-12-03 | End: 2021-12-03 | Stop reason: HOSPADM

## 2021-12-03 RX ORDER — DEXAMETHASONE SODIUM PHOSPHATE 4 MG/ML
INJECTION, SOLUTION INTRA-ARTICULAR; INTRALESIONAL; INTRAMUSCULAR; INTRAVENOUS; SOFT TISSUE
Status: COMPLETED | OUTPATIENT
Start: 2021-12-03 | End: 2021-12-03

## 2021-12-03 RX ORDER — OXYCODONE HYDROCHLORIDE 5 MG/1
5 TABLET ORAL
Status: DISCONTINUED | OUTPATIENT
Start: 2021-12-03 | End: 2021-12-03 | Stop reason: HOSPADM

## 2021-12-03 RX ORDER — LIDOCAINE HYDROCHLORIDE 20 MG/ML
INJECTION, SOLUTION EPIDURAL; INFILTRATION; INTRACAUDAL; PERINEURAL AS NEEDED
Status: DISCONTINUED | OUTPATIENT
Start: 2021-12-03 | End: 2021-12-03 | Stop reason: HOSPADM

## 2021-12-03 RX ORDER — ROCURONIUM BROMIDE 10 MG/ML
INJECTION, SOLUTION INTRAVENOUS AS NEEDED
Status: DISCONTINUED | OUTPATIENT
Start: 2021-12-03 | End: 2021-12-03 | Stop reason: HOSPADM

## 2021-12-03 RX ORDER — FLUMAZENIL 0.1 MG/ML
0.2 INJECTION INTRAVENOUS AS NEEDED
Status: DISCONTINUED | OUTPATIENT
Start: 2021-12-03 | End: 2021-12-03 | Stop reason: HOSPADM

## 2021-12-03 RX ORDER — GLYCOPYRROLATE 0.2 MG/ML
INJECTION INTRAMUSCULAR; INTRAVENOUS AS NEEDED
Status: DISCONTINUED | OUTPATIENT
Start: 2021-12-03 | End: 2021-12-03 | Stop reason: HOSPADM

## 2021-12-03 RX ORDER — FENTANYL CITRATE 50 UG/ML
100 INJECTION, SOLUTION INTRAMUSCULAR; INTRAVENOUS ONCE
Status: COMPLETED | OUTPATIENT
Start: 2021-12-03 | End: 2021-12-03

## 2021-12-03 RX ORDER — MIDAZOLAM HYDROCHLORIDE 1 MG/ML
2 INJECTION, SOLUTION INTRAMUSCULAR; INTRAVENOUS
Status: DISCONTINUED | OUTPATIENT
Start: 2021-12-03 | End: 2021-12-03 | Stop reason: HOSPADM

## 2021-12-03 RX ORDER — ONDANSETRON 2 MG/ML
INJECTION INTRAMUSCULAR; INTRAVENOUS AS NEEDED
Status: DISCONTINUED | OUTPATIENT
Start: 2021-12-03 | End: 2021-12-03 | Stop reason: HOSPADM

## 2021-12-03 RX ORDER — LIDOCAINE HYDROCHLORIDE 10 MG/ML
0.1 INJECTION INFILTRATION; PERINEURAL AS NEEDED
Status: DISCONTINUED | OUTPATIENT
Start: 2021-12-03 | End: 2021-12-03 | Stop reason: HOSPADM

## 2021-12-03 RX ORDER — DEXAMETHASONE SODIUM PHOSPHATE 4 MG/ML
INJECTION, SOLUTION INTRA-ARTICULAR; INTRALESIONAL; INTRAMUSCULAR; INTRAVENOUS; SOFT TISSUE AS NEEDED
Status: DISCONTINUED | OUTPATIENT
Start: 2021-12-03 | End: 2021-12-03 | Stop reason: HOSPADM

## 2021-12-03 RX ORDER — NEOSTIGMINE METHYLSULFATE 1 MG/ML
INJECTION, SOLUTION INTRAVENOUS AS NEEDED
Status: DISCONTINUED | OUTPATIENT
Start: 2021-12-03 | End: 2021-12-03 | Stop reason: HOSPADM

## 2021-12-03 RX ORDER — NALOXONE HYDROCHLORIDE 0.4 MG/ML
0.1 INJECTION, SOLUTION INTRAMUSCULAR; INTRAVENOUS; SUBCUTANEOUS
Status: DISCONTINUED | OUTPATIENT
Start: 2021-12-03 | End: 2021-12-03 | Stop reason: HOSPADM

## 2021-12-03 RX ORDER — OXYCODONE HYDROCHLORIDE 5 MG/1
10 TABLET ORAL
Status: DISCONTINUED | OUTPATIENT
Start: 2021-12-03 | End: 2021-12-03 | Stop reason: HOSPADM

## 2021-12-03 RX ADMIN — LIDOCAINE HYDROCHLORIDE 50 MG: 20 INJECTION, SOLUTION EPIDURAL; INFILTRATION; INTRACAUDAL; PERINEURAL at 07:27

## 2021-12-03 RX ADMIN — PHENYLEPHRINE HYDROCHLORIDE 120 MCG: 10 INJECTION INTRAVENOUS at 08:09

## 2021-12-03 RX ADMIN — PROPOFOL 160 MG: 10 INJECTION, EMULSION INTRAVENOUS at 07:27

## 2021-12-03 RX ADMIN — PHENYLEPHRINE HYDROCHLORIDE 120 MCG: 10 INJECTION INTRAVENOUS at 07:58

## 2021-12-03 RX ADMIN — PHENYLEPHRINE HYDROCHLORIDE 120 MCG: 10 INJECTION INTRAVENOUS at 07:33

## 2021-12-03 RX ADMIN — GLYCOPYRROLATE 0.8 MG: 0.2 INJECTION, SOLUTION INTRAMUSCULAR; INTRAVENOUS at 08:16

## 2021-12-03 RX ADMIN — PHENYLEPHRINE HYDROCHLORIDE 120 MCG: 10 INJECTION INTRAVENOUS at 07:32

## 2021-12-03 RX ADMIN — FENTANYL CITRATE 50 MCG: 50 INJECTION INTRAMUSCULAR; INTRAVENOUS at 07:27

## 2021-12-03 RX ADMIN — Medication 3 AMPULE: at 06:33

## 2021-12-03 RX ADMIN — PHENYLEPHRINE HYDROCHLORIDE 120 MCG: 10 INJECTION INTRAVENOUS at 08:03

## 2021-12-03 RX ADMIN — ASPIRIN 81 MG: 81 TABLET, CHEWABLE ORAL at 06:50

## 2021-12-03 RX ADMIN — DEXAMETHASONE SODIUM PHOSPHATE 4 MG: 4 INJECTION, SOLUTION INTRA-ARTICULAR; INTRALESIONAL; INTRAMUSCULAR; INTRAVENOUS; SOFT TISSUE at 07:35

## 2021-12-03 RX ADMIN — PHENYLEPHRINE HYDROCHLORIDE 120 MCG: 10 INJECTION INTRAVENOUS at 08:14

## 2021-12-03 RX ADMIN — ROCURONIUM BROMIDE 50 MG: 10 INJECTION, SOLUTION INTRAVENOUS at 07:28

## 2021-12-03 RX ADMIN — ROPIVACAINE HYDROCHLORIDE 30 ML: 5 INJECTION, SOLUTION EPIDURAL; INFILTRATION; PERINEURAL at 07:03

## 2021-12-03 RX ADMIN — CEFAZOLIN 2 G: 1 INJECTION, POWDER, FOR SOLUTION INTRAVENOUS at 07:41

## 2021-12-03 RX ADMIN — PHENYLEPHRINE HYDROCHLORIDE 120 MCG: 10 INJECTION INTRAVENOUS at 07:38

## 2021-12-03 RX ADMIN — SODIUM CHLORIDE, SODIUM LACTATE, POTASSIUM CHLORIDE, AND CALCIUM CHLORIDE 75 ML/HR: 600; 310; 30; 20 INJECTION, SOLUTION INTRAVENOUS at 06:33

## 2021-12-03 RX ADMIN — Medication 5 MG: at 08:16

## 2021-12-03 RX ADMIN — FENTANYL CITRATE 25 MCG: 50 INJECTION, SOLUTION INTRAMUSCULAR; INTRAVENOUS at 07:00

## 2021-12-03 RX ADMIN — DEXAMETHASONE SODIUM PHOSPHATE 4 MG: 4 INJECTION, SOLUTION INTRAMUSCULAR; INTRAVENOUS at 07:03

## 2021-12-03 RX ADMIN — PHENYLEPHRINE HYDROCHLORIDE 120 MCG: 10 INJECTION INTRAVENOUS at 07:51

## 2021-12-03 RX ADMIN — MIDAZOLAM 0.5 MG: 1 INJECTION INTRAMUSCULAR; INTRAVENOUS at 07:00

## 2021-12-03 RX ADMIN — ONDANSETRON 4 MG: 2 INJECTION INTRAMUSCULAR; INTRAVENOUS at 08:13

## 2021-12-03 NOTE — ANESTHESIA POSTPROCEDURE EVALUATION
Procedure(s):  RIGHT HUMERUS INTRA MEDULLARY NAIL INSERTION/ CHOICE. general    Anesthesia Post Evaluation      Multimodal analgesia: multimodal analgesia used between 6 hours prior to anesthesia start to PACU discharge  Patient location during evaluation: PACU  Patient participation: complete - patient participated  Level of consciousness: awake  Pain management: adequate  Airway patency: patent  Anesthetic complications: no  Cardiovascular status: acceptable and hemodynamically stable  Respiratory status: acceptable  Hydration status: acceptable  Comments: Acceptable for discharge from PACU. Post anesthesia nausea and vomiting:  none  Final Post Anesthesia Temperature Assessment:  Normothermia (36.0-37.5 degrees C)      INITIAL Post-op Vital signs:   Vitals Value Taken Time   /65 12/03/21 1007   Temp 36.4 °C (97.5 °F) 12/03/21 0918   Pulse 75 12/03/21 1014   Resp 14 12/03/21 1007   SpO2 90 % 12/03/21 1014   Vitals shown include unvalidated device data.

## 2021-12-03 NOTE — DISCHARGE INSTRUCTIONS
ACTIVITY AFTER DISCHARGE Patient should: Resume activity as tolerated. ADULT DIET Regular     DRESSING, REMOVE IN 72 HOURS   Remove dressing in 72 hours. May replace with dry gauze dressing   May Shower at that time    MEDICATION INTERACTION:  During your procedure you potentially received a medication or medications which may reduce the effectiveness of oral contraceptives. Please consider other forms of contraception for 1 month following your procedure if you are currently using oral contraceptives as your primary form of birth control. In addition to this, we recommend continuing your oral contraceptive as prescribed, unless otherwise instructed by your physician, during this time    After general anesthesia or intravenous sedation, for 24 hours or while taking prescription Narcotics:  · Limit your activities  · A responsible adult needs to be with you for the next 24 hours  · Do not drive and operate hazardous machinery  · Do not make important personal or business decisions  · Do not drink alcoholic beverages  · If you have not urinated within 8 hours after discharge, and you are experiencing discomfort from urinary retention, please go to the nearest ED. · If you have sleep apnea and have a CPAP machine, please use it for all naps and sleeping. · Please use caution when taking narcotics and any of your home medications that may cause drowsiness. *  Please give a list of your current medications to your Primary Care Provider. *  Please update this list whenever your medications are discontinued, doses are      changed, or new medications (including over-the-counter products) are added. *  Please carry medication information at all times in case of emergency situations. These are general instructions for a healthy lifestyle:  No smoking/ No tobacco products/ Avoid exposure to second hand smoke  Surgeon General's Warning:  Quitting smoking now greatly reduces serious risk to your health.   Obesity, smoking, and sedentary lifestyle greatly increases your risk for illness  A healthy diet, regular physical exercise & weight monitoring are important for maintaining a healthy lifestyle    You may be retaining fluid if you have a history of heart failure or if you experience any of the following symptoms:  Weight gain of 3 pounds or more overnight or 5 pounds in a week, increased swelling in our hands or feet or shortness of breath while lying flat in bed. Please call your doctor as soon as you notice any of these symptoms; do not wait until your next office visit.

## 2021-12-03 NOTE — ANESTHESIA PREPROCEDURE EVALUATION
Relevant Problems   RESPIRATORY SYSTEM   (+) Complex sleep apnea syndrome   (+) Dyspnea on exertion   (+) Dyspnea/Shortness of breath      CARDIOVASCULAR   (+) Arteriosclerosis of coronary artery bypass graft   (+) CAD (coronary artery disease), native coronary artery   (+) Coronary atherosclerosis of native coronary artery      GASTROINTESTINAL   (+) Esophageal reflux   (+) GERD (gastroesophageal reflux disease)      ENDOCRINE   (+) Arthritis   (+) Type 2 diabetes mellitus with diabetic peripheral angiopathy without gangrene, without long-term current use of insulin (HCC)       Anesthetic History     PONV          Review of Systems / Medical History  Patient summary reviewed and pertinent labs reviewed    Pulmonary    COPD (daily MDI): moderate    Sleep apnea: CPAP           Neuro/Psych     seizures: well controlled         Cardiovascular              CAD, cardiac stents (remote - remains on bASA), CABG (remote) and hyperlipidemia    Exercise tolerance: >4 METS  Comments: Echo 2020 - normal EF, no sig valve dz  Perfusion 2018 - normal perfusion   GI/Hepatic/Renal     GERD: well controlled           Endo/Other    Diabetes (Borderline - no meds)    Arthritis     Other Findings              Physical Exam    Airway  Mallampati: II  TM Distance: > 6 cm  Neck ROM: normal range of motion   Mouth opening: Normal     Cardiovascular    Rhythm: regular  Rate: normal         Dental    Dentition: Full upper dentures     Pulmonary  Breath sounds clear to auscultation               Abdominal         Other Findings            Anesthetic Plan    ASA: 3  Anesthesia type: general      Post-op pain plan if not by surgeon: peripheral nerve block single      Anesthetic plan and risks discussed with: Patient and Spouse

## 2021-12-03 NOTE — OP NOTES
Operative Report    Patient: Annabel Charles MRN: [id number]  SSN: [id number]    YOB: 1940  Age: [de-identified] y.o. Sex: male       Date of Surgery: 12/3/2021     History:  Annabel Charles is a [de-identified] y.o. male who fell and injured his right humerus. He suffered a comminuted fracture of his right humerus he had about a midshaft long oblique fracture line as well as a fracture line that extended up to the area of his proximal humerus that was essentially nondisplaced this was a rather comminuted fracture of his right humerus with significant displacement at the primary fracture line which was midshaft area. I talked him about different treatment options. Obviously I was very concerned with his age with his history of pulmonary issues as well as with his cardiac history about proceeding with any operative treatment so I try to make sure he understood the thought this was a significant risk associate with his anesthesia but I did think it was important to try to get this treated operatively I was concerned with the degree of displacement he had as well as with the fact that he takes some medications that would limit and slow bone healing that he would be at very high risk for nonunion and malunion and we have treated this nonoperatively. After talking him about this he seemed to feel comfortable consenting. I talked to the patient and/or their representative and explained the exact nature the procedure. I also went through a detailed list of the material risks associated with  the procedure which included risk of bleeding, infection, injury to nearby structures, worsening the situation, as well as the risks associate with anesthesia and finally death. Also talked with him regarding the benefits and alternatives to the procedure.     Preoperative Diagnosis: Closed displaced comminuted fracture of shaft of right humerus, initial encounter [S42.290A]     Postoperative Diagnosis: Closed displaced comminuted fracture of shaft of right humerus, initial encounter [S42.734J]     Surgeon(s) and Role:     * Hailey Lim MD - Primary    Anesthesia: MAC     Procedure: Procedure(s):  Open treatment of right humeral shaft fracture with intramedullary nail fixation    Procedure in Detail: After the successful duction of a regional block for postoperative analgesia as well as general anesthetic the right upper extremity was prepped and draped in usual sterile fashion. I then made a small incision off the anterolateral aspect of the acromion and through this placed a guidewire in the center of the humeral head on both the AP and lateral projection. Once this was in appropriate position I used a cannulated awl to open the proximal humerus and then placed a guidewire in the proximal fragment I placed a bend in the guidewire but this proved to be rather difficult so I then used the reduction tool to place the guidewire across the fracture site I was able to do this successfully without making an incision at the fracture site once the guidewire was in appropriate position we then measured for a 270 mm nail and then placed a Synthes 8.5 x 270 mm multi like humeral nail into the right humerus. Once this was in appropriate position I placed a single screw in the proximal portion of the humerus in the head making sure this was in appropriate position on both the AP and lateral projection. I then placed a second screw proximally in the proximal shaft portion of the humerus and then using freehand technique placed a single anterior to posterior interlock bolt distally. Once this was done I checked the overall reduction as well as a place my hardware on both AP and lateral projection was very pleased with this.   I then removed the insertion handle closed all my incision with staples dressings were applied the patient was awakened and taken recovery in stable condition      Estimated Blood Loss: 90 cc    Tourniquet Time: * No tourniquets in log *      Implants:   Implant Name Type Inv. Item Serial No.  Lot No. LRB No. Used Action   NAIL IM MICHELLE [id number] MM HUM TI 6% ALUM 7% [employer] - [id number]  NAIL IM MICHELLE [id number] MM HUM TI 6% ALUM 7% NIOBIUM MULTILOC  [employer] [facility]_ [id number] Right 1 Implanted               Specimens: * No specimens in log *        Drains: None                Complications: None    Counts: Sponge and needle counts were correct times two.     Signed By:  Alba Schumacher MD     December 3, 2021

## 2021-12-03 NOTE — INTERVAL H&P NOTE
Update History & Physical  The Patient's History and Physical of 12/2/2021 was reviewed with the patient and I examined the patient. There was no change. The surgical site was confirmed by the patient and me. Plan:  The risk, benefits, expected outcome, and alternative to the recommended procedure have been discussed with the patient. Patient understands and wants to proceed with open treatment of right humeral shaft fracture with intramedullary nail fixation.     Electronically signed by Ang Garcia MD on 12/3/2021 at 6:55 AM

## 2021-12-03 NOTE — PROGRESS NOTES
I have reviewed the patient's history of controlled substance prescriptions in the [place] prescription drug monitoring program.

## 2021-12-03 NOTE — ANESTHESIA PROCEDURE NOTES
Peripheral Block    Start time: 12/3/2021 7:01 AM  End time: 12/3/2021 7:03 AM  Performed by: Ijeoma Park MD  Authorized by: Ijeoma Park MD       Pre-procedure: Indications: at surgeon's request and post-op pain management    Preanesthetic Checklist: patient identified, risks and benefits discussed, site marked, timeout performed, anesthesia consent given and patient being monitored    Timeout Time: 07:00 EST          Block Type:   Block Type: Interscalene  Laterality:  Right  Monitoring:  Standard ASA monitoring, continuous pulse ox, frequent vital sign checks, heart rate, oxygen and responsive to questions  Injection Technique:  Single shot  Procedures: ultrasound guided and nerve stimulator    Patient Position: supine (30 degree upright)  Prep: chlorhexidine    Location:  Interscalene  Needle Type:  Stimuplex  Needle Gauge:  21 G  Needle Localization:  Nerve stimulator and ultrasound guidance  Motor Response comment:  Motor twitch extinguished between 0.2-0.5 mA  Motor Response: minimal motor response >0.4 mA    Medication Injected:  Ropivacaine 0.375% with epi 1:200,000 in NS injection, 30 mL (Mixture components: ropivacaine (PF) 5 mg/mL (0.5 %) Soln, . 75 mL; EPINEPHrine HCl (PF) 1 mg/mL (1 mL) Soln, . 005 mL; 0.9% sodium chloride Soln, .245 mL)  dexamethasone (DECADRON) 4 mg/mL injection, 4 mg  Med Admin Time: 12/3/2021 7:03 AM    Assessment:  Number of attempts:  1  Injection Assessment:  Incremental injection every 5 mL, negative aspiration for CSF, no paresthesia, ultrasound image on chart, local visualized surrounding nerve on ultrasound, negative aspiration for blood and no intravascular symptoms  Patient tolerance:  Patient tolerated the procedure well with no immediate complications

## 2021-12-05 ENCOUNTER — APPOINTMENT (OUTPATIENT)
Dept: ULTRASOUND IMAGING | Age: 81
End: 2021-12-05
Attending: STUDENT IN AN ORGANIZED HEALTH CARE EDUCATION/TRAINING PROGRAM
Payer: MEDICARE

## 2021-12-05 ENCOUNTER — HOSPITAL ENCOUNTER (EMERGENCY)
Age: 81
Discharge: HOME | End: 2021-12-06
Attending: STUDENT IN AN ORGANIZED HEALTH CARE EDUCATION/TRAINING PROGRAM
Payer: MEDICARE

## 2021-12-05 ENCOUNTER — APPOINTMENT (OUTPATIENT)
Dept: GENERAL RADIOLOGY | Age: 81
End: 2021-12-05
Attending: STUDENT IN AN ORGANIZED HEALTH CARE EDUCATION/TRAINING PROGRAM
Payer: MEDICARE

## 2021-12-05 DIAGNOSIS — S40.021A SUPERFICIAL BRUISING OF ARM, RIGHT, INITIAL ENCOUNTER: Primary | ICD-10-CM

## 2021-12-05 PROCEDURE — 73060 X-RAY EXAM OF HUMERUS: CPT

## 2021-12-05 PROCEDURE — 93971 EXTREMITY STUDY: CPT

## 2021-12-05 PROCEDURE — 99282 EMERGENCY DEPT VISIT SF MDM: CPT

## 2021-12-06 VITALS
BODY MASS INDEX: 32.02 KG/M2 | RESPIRATION RATE: 18 BRPM | HEIGHT: 67 IN | HEART RATE: 77 BPM | WEIGHT: 204 LBS | OXYGEN SATURATION: 96 % | SYSTOLIC BLOOD PRESSURE: 128 MMHG | DIASTOLIC BLOOD PRESSURE: 70 MMHG | TEMPERATURE: 97.5 F

## 2021-12-06 NOTE — ED NOTES
I have reviewed discharge instructions with the patient. The patient verbalized understanding. Patient left ED via Discharge Method: ambulatory to Home with spouse  Opportunity for questions and clarification provided. Patient given 0 scripts. To continue your aftercare when you leave the hospital, you may receive an automated call from our care team to check in on how you are doing. This is a free service and part of our promise to provide the best care and service to meet your aftercare needs.  If you have questions, or wish to unsubscribe from this service please call [phone]. Thank you for Choosing our [facility] Emergency Department.

## 2021-12-06 NOTE — DISCHARGE INSTRUCTIONS
Continue to follow the instructions that were provided to you by orthopedics. Return to the emergency department for any new, worsening, or concerning symptoms.

## 2021-12-06 NOTE — ED PROVIDER NOTES
[de-identified] male who presents emergency department today with complaint of bruising to his right arm. Patient was seen last week for a right humerus fracture. He had surgery on Friday. He states he had no issues after the surgery. He did have some difficulty moving his shoulder this morning but states that that has since resolved. He states that he did not notice any bruising initially but then throughout the day today, he began to notice bruising all down his right arm. He denies any blood thinners but states he is on daily 81 mg aspirin. The history is provided by the patient. Post OP Complication  This is a new problem. The current episode started 6 to 12 hours ago. The problem has not changed since onset. Pertinent negatives include no chest pain, no abdominal pain and no shortness of breath. He has tried nothing for the symptoms.         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 Binh Vegas Arthritis     back, recieves injections    Asthma     inhaler     BPH (benign prostatic hyperplasia)     CAD (coronary artery disease) 2001    MI - CABG x 4    Central sleep apnea     [facility] follows    Chronic kidney disease     stage 3    Chronic obstructive pulmonary disease (HCC)     Breo inhaler,  [facility] follows    Coronary atherosclerosis of native coronary artery 11/2/2015    Diabetes ([address]     not on medications; 11/22/21 A1c 6.7    Dyspnea/Shortness of breath 11/2/2015    Elevated prostate specific antigen (PSA)     Elevated PSA     Esophageal reflux     Extremity atherosclerosis with intermittent claudication ([address] 11/2/2015    Extremity atherosclerosis with intermittent claudication (HCC)     GERD (gastroesophageal reflux disease)     medication    Heart disease, unspecified     Hip fracture ([address] 2017    Humeral shaft fracture     right    Hyperlipidemia 2015    Hypertrophy of prostate with urinary obstruction and other lower urinary tract symptoms (LUTS)     Lumbar spondylolysis     injections every 3-4 months    Pure hypercholesterolemia     Seizures ([facility] [address] 2018    no seizures recently, last seizure 2019    Sleep apnea     CPAP nightly    Unresponsive episode        Past Surgical History:   Procedure Laterality Date    HX CHOLECYSTECTOMY      HX CORONARY ARTERY BYPASS GRAFT  2001    HX HIP REPLACEMENT Right     HX KNEE REPLACEMENT Left     total    HX ORTHOPAEDIC      knee surgury         Family History:   Problem Relation Age of Onset    Cancer Mother     Cancer Father     Heart Disease Father     Elevated Lipids Father        Social History     Socioeconomic History    Marital status:      Spouse name: Not on file    Number of children: Not on file    Years of education: Not on file    Highest education level: Not on file   Occupational History    Not on file   Tobacco Use    Smoking status: Former Smoker     Packs/day: 2.00     Years: 15.00     Pack years: 30.00     Quit date: 1972     Years since quittin.9    Smokeless tobacco: Never Used   Vaping Use    Vaping Use: Never used   Substance and Sexual Activity    Alcohol use: No    Drug use: Never    Sexual activity: Not on file   Other Topics Concern    Not on file   Social History Narrative    Not on file     Social Determinants of Health     Financial Resource Strain:     Difficulty of Paying Living Expenses: Not on file   Food Insecurity:     Worried About [address] in the Last Year: Not on file    Uriel of Food in the Last Year: Not on file   Transportation Needs:     Lack of Transportation (Medical): Not on file    Lack of Transportation (Non-Medical):  Not on file   Physical Activity:     Days of Exercise per Week: Not on file    Minutes of Exercise per Session: Not on file   Stress:     Feeling of Stress : Not on file   Social Connections:     Frequency of Communication with Friends and Family: Not on file    Frequency of Social Gatherings with Friends and Family: Not on file    Attends Mormonism Services: Not on file    Active Member of Clubs or Organizations: Not on file    Attends Club or Organization Meetings: Not on file    Marital Status: Not on file   Intimate Partner Violence:     Fear of Current or Ex-Partner: Not on file    Emotionally Abused: Not on file    Physically Abused: Not on file    Sexually Abused: Not on file   Housing Stability:     Unable to Pay for Housing in the Last Year: Not on file    Number of [place] in the Last Year: Not on file    Unstable Housing in the Last Year: Not on file         ALLERGIES: Phenergan [promethazine] and Codeine    Review of Systems   Constitutional: Negative for fever. Respiratory: Negative for shortness of breath. Cardiovascular: Negative for chest pain. Gastrointestinal: Negative for abdominal pain. Musculoskeletal:        Right arm bruising   All other systems reviewed and are negative. Vitals:    12/05/21 2113 12/05/21 2250   BP: (!) 141/72 124/71   Resp: 16 16   Temp: 98.5 °F (36.9 °C) 98.7 °F (37.1 °C)   SpO2: 100% 97%   Weight: 92.5 kg (204 lb)    Height: 5' 7\" (1.702 m)             Physical Exam  Vitals and nursing note reviewed. Constitutional:       General: He is not in acute distress. Appearance: Normal appearance. He is not ill-appearing, toxic-appearing or diaphoretic. HENT:      Head: Normocephalic and atraumatic. Right Ear: External ear normal.      Left Ear: External ear normal.      Mouth/Throat:      Mouth: Mucous membranes are moist.      Pharynx: Oropharynx is clear. Eyes:      General: No scleral icterus. Extraocular Movements: Extraocular movements intact. Conjunctiva/sclera: Conjunctivae normal.   Cardiovascular:      Rate and Rhythm: Normal rate.       Pulses: Radial pulses are 2+ on the right side and 2+ on the left side. Heart sounds: Normal heart sounds. Pulmonary:      Effort: Pulmonary effort is normal. No respiratory distress. Breath sounds: Normal breath sounds. Abdominal:      General: Abdomen is flat. There is no distension. Musculoskeletal:         General: Normal range of motion. Right shoulder: No swelling, deformity or tenderness. Normal range of motion. Normal pulse. Right upper arm: Swelling and tenderness present. Cervical back: Normal range of motion and neck supple. No rigidity. Right lower leg: No edema. Left lower leg: No edema. Comments: Bruising noted from proximal right upper arm and down to right wrist.  No bruising on right hand. Patient has surgical sites noted on right upper arm with bandages in place. Patient does not appear to have any limited range of motion right arm or shoulder. He has some mild tenderness with palpation to his right upper arm. He has some mild swelling in his upper arm and forearm. Patient has good radial pulses that are equal bilaterally. Capillary refill in digits of right hand appears less than 3 seconds. Skin:     General: Skin is warm and dry. Capillary Refill: Capillary refill takes less than 2 seconds. Findings: Bruising and ecchymosis present. Comments: Bruising from proximal right upper arm extending down to right wrist.   Neurological:      General: No focal deficit present. Mental Status: He is alert and oriented to person, place, and time. Psychiatric:         Mood and Affect: Mood normal.         Behavior: Behavior normal.         Thought Content:  Thought content normal.         Judgment: Judgment normal.          MDM  Number of Diagnoses or Management Options  Superficial bruising of arm, right, initial encounter: new and requires workup  Diagnosis management comments: 28-year-old male presents emergency department today for complaint of right arm bruising. He did have a humerus fracture last week with surgical repair on Friday. He denies any issues immediately following surgery but states that progressively throughout the day today he has had increased bruising. He denies any pain at time of exam.  He is alert and oriented. He appears in no distress. Case was discussed with Dr. Jillian Carter. Ultrasound and x-rays are negative today. Supportive treatment discussed and encouraged. Encouraged follow-up with orthopedics. I have discussed the results of all labs, procedures, radiographs, and/or treatments with the patient and available family members. uYlia Álvarez is agreed upon by the patient and the patient is ready for discharge.  Questions about treatment in the ED and differential diagnosis of presenting condition were answered. Wellington Line was given verbal discharge instructions including, but not limited to, importance of returning to the emergency department for any concern of worsening or continued symptoms.  Instructions were given to follow up with a primary care provider or specialist within 1-2 days. Yoakum Hefty effects of medications, if prescribed, were discussed and patient was advised to refrain from significant physical activity until followed up by primary care physician and to not drive or operate heavy machinery after taking any sedating substances.      Zeyad Khanna NP; 12/6/2021 @1:01 AM Voice dictation software was used during the making of  this note. This software is not perfect and grammatical and other typographical errors  may be present. This note has not been proofread for errors.          Amount and/or Complexity of Data Reviewed  Tests in the radiology section of CPT®: reviewed  Discuss the patient with other providers: yes (Dr. Jillian Carter)    Risk of Complications, Morbidity, and/or Mortality  Presenting problems: moderate  Diagnostic procedures: moderate  Management options: moderate    Patient Progress  Patient progress: improved    ED Course as of 12/06/21 0101   Sun Dec 05, 2021   2231 XR HUMERUS RT  INDICATION: Recent right shoulder surgery now with bruising.     AP and lateral views of the right humerus were obtained.     IMPRESSION  Intramedullary carlos enrique noted in the right humerus reducing a comminuted  mid diaphyseal fracture. Right humeral head is located. Bone fragments are in  near-anatomic alignment. [BC]   Mon Dec 06, 2021   0045 DUPLEX UPPER EXT VENOUS RIGHT  FINDINGS:     The internal jugular, innominate and subclavian veins are patent and demonstrate  color Doppler flow. There is normal spectral waveform of the subclavian vein.      The axillary and the brachial veins are patent and demonstrate color Doppler  flow. There is compressibility. The basilic and cephalic veins are patent. Radial and ulnar veins are patent. No evidence of deep vein thrombosis.       IMPRESSION     1. No evidence of deep vein thrombosis.  [BC]      ED Course User Index  [BC] Giovanni Addison NP       Procedures

## 2021-12-06 NOTE — ED NOTES
Pt with spouse. he states had carlos enrique placed to rt shoulder Friday. Was getting ready for bed and noted bloody drainage to dressing . Noter rt arm with bulky shoulder dressing and lower upper forearm theophylline upper forearm dressing is with dried red drainage. Skin to arm is red blotchy pulses strong. Cap refill less than 2 seconds.

## 2022-03-18 PROBLEM — R07.9 CHEST PAIN: Status: ACTIVE | Noted: 2018-07-20

## 2022-03-18 PROBLEM — E78.2 MIXED HYPERLIPIDEMIA: Status: ACTIVE | Noted: 2021-11-08

## 2022-03-18 PROBLEM — R42 VERTIGO: Status: ACTIVE | Noted: 2019-06-19

## 2022-03-18 PROBLEM — R40.4 UNRESPONSIVE EPISODE: Status: ACTIVE | Noted: 2020-06-19

## 2022-03-18 PROBLEM — R11.15 NON-INTRACTABLE CYCLICAL VOMITING WITH NAUSEA: Status: ACTIVE | Noted: 2019-06-20

## 2022-03-18 PROBLEM — R41.89 UNRESPONSIVE EPISODE: Status: ACTIVE | Noted: 2020-06-19

## 2022-03-18 PROBLEM — R11.2 NAUSEA AND VOMITING: Status: ACTIVE | Noted: 2019-06-19

## 2022-03-18 PROBLEM — R53.83 FATIGUE: Status: ACTIVE | Noted: 2017-10-09

## 2022-03-19 PROBLEM — D12.6 BENIGN NEOPLASM OF COLON: Status: ACTIVE | Noted: 2021-11-08

## 2022-03-19 PROBLEM — R42 ORTHOSTATIC LIGHTHEADEDNESS: Status: ACTIVE | Noted: 2019-06-19

## 2022-03-19 PROBLEM — G31.84 MILD COGNITIVE IMPAIRMENT: Status: ACTIVE | Noted: 2021-09-15

## 2022-03-19 PROBLEM — G47.39 COMPLEX SLEEP APNEA SYNDROME: Status: ACTIVE | Noted: 2021-07-09

## 2022-03-19 PROBLEM — K22.2 ESOPHAGEAL STENOSIS: Status: ACTIVE | Noted: 2021-11-08

## 2022-03-19 PROBLEM — G47.31 COMPLEX SLEEP APNEA SYNDROME: Status: ACTIVE | Noted: 2021-07-09

## 2022-03-19 PROBLEM — E11.51 TYPE 2 DIABETES MELLITUS WITH DIABETIC PERIPHERAL ANGIOPATHY WITHOUT GANGRENE, WITHOUT LONG-TERM CURRENT USE OF INSULIN (HCC): Status: ACTIVE | Noted: 2019-05-17

## 2022-03-19 PROBLEM — K21.9 GERD (GASTROESOPHAGEAL REFLUX DISEASE): Status: ACTIVE | Noted: 2021-11-08

## 2022-03-19 PROBLEM — R40.20 LOSS OF CONSCIOUSNESS (HCC): Status: ACTIVE | Noted: 2020-06-30

## 2022-03-19 PROBLEM — N30.00 ACUTE CYSTITIS: Status: ACTIVE | Noted: 2017-10-09

## 2022-03-19 PROBLEM — R06.09 DYSPNEA ON EXERTION: Status: ACTIVE | Noted: 2018-07-20

## 2022-03-20 PROBLEM — M19.90 ARTHRITIS: Status: ACTIVE | Noted: 2021-11-08

## 2022-03-20 PROBLEM — S72.009A HIP FX (HCC): Status: ACTIVE | Noted: 2017-08-21

## 2022-06-28 ENCOUNTER — TELEPHONE (OUTPATIENT)
Dept: SLEEP MEDICINE | Age: 82
End: 2022-06-28

## 2022-06-28 NOTE — TELEPHONE ENCOUNTER
Contact pt and lvm ----- Message from KRISTINA Alford sent at 6/27/2022  7:20 AM EDT -----  Please let pt know that his echo shows that his heart function is good enough to continue using ASV. Thank you!

## 2022-06-29 ENCOUNTER — TELEPHONE (OUTPATIENT)
Dept: CARDIOLOGY CLINIC | Age: 82
End: 2022-06-29

## 2022-06-29 ENCOUNTER — TELEPHONE (OUTPATIENT)
Dept: PULMONOLOGY | Age: 82
End: 2022-06-29

## 2022-06-29 NOTE — TELEPHONE ENCOUNTER
Patient's wife called stating she received a call from this office yesterday. I did not see a note. Please call patient and advise. Please call wife's cell phone [phone].

## 2022-07-14 ENCOUNTER — OFFICE VISIT (OUTPATIENT)
Dept: CARDIOLOGY CLINIC | Age: 82
End: 2022-07-14
Payer: MEDICARE

## 2022-07-14 VITALS
BODY MASS INDEX: 29.89 KG/M2 | SYSTOLIC BLOOD PRESSURE: 110 MMHG | HEART RATE: 76 BPM | HEIGHT: 70 IN | DIASTOLIC BLOOD PRESSURE: 66 MMHG | WEIGHT: 208.8 LBS

## 2022-07-14 DIAGNOSIS — I25.10 CORONARY ARTERY DISEASE INVOLVING NATIVE CORONARY ARTERY OF NATIVE HEART WITHOUT ANGINA PECTORIS: Primary | ICD-10-CM

## 2022-07-14 PROCEDURE — G8417 CALC BMI ABV UP PARAM F/U: HCPCS | Performed by: INTERNAL MEDICINE

## 2022-07-14 PROCEDURE — 1123F ACP DISCUSS/DSCN MKR DOCD: CPT | Performed by: INTERNAL MEDICINE

## 2022-07-14 PROCEDURE — 1036F TOBACCO NON-USER: CPT | Performed by: INTERNAL MEDICINE

## 2022-07-14 PROCEDURE — 93000 ELECTROCARDIOGRAM COMPLETE: CPT | Performed by: INTERNAL MEDICINE

## 2022-07-14 PROCEDURE — 99213 OFFICE O/P EST LOW 20 MIN: CPT | Performed by: INTERNAL MEDICINE

## 2022-07-14 PROCEDURE — G8427 DOCREV CUR MEDS BY ELIG CLIN: HCPCS | Performed by: INTERNAL MEDICINE

## 2022-07-14 RX ORDER — ALENDRONATE SODIUM 70 MG/1
70 TABLET ORAL WEEKLY
COMMUNITY
Start: 2022-06-20

## 2022-07-14 ASSESSMENT — ENCOUNTER SYMPTOMS
SHORTNESS OF BREATH: 0
HEMATOCHEZIA: 0
ORTHOPNEA: 0
WHEEZING: 0
HOARSE VOICE: 0
BOWEL INCONTINENCE: 0
CHEST TIGHTNESS: 0
DIARRHEA: 0
HEMATEMESIS: 0
SPUTUM PRODUCTION: 0
ABDOMINAL PAIN: 0
BLURRED VISION: 0
COLOR CHANGE: 0

## 2022-07-14 NOTE — PROGRESS NOTES
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 Satnam Montague Arthritis     back, recieves injections    Asthma     inhaler     BPH (benign prostatic hyperplasia)     CAD (coronary artery disease) 2001    MI - CABG x 4    Central sleep apnea     [facility] follows    Chronic kidney disease     stage 3    Chronic obstructive pulmonary disease ([address]     Breo inhaler,  [facility] follows    Coronary atherosclerosis of native coronary artery 11/2/2015    Diabetes ([address]     not on medications; 11/22/21 A1c 6.7    Elevated prostate specific antigen (PSA)     Elevated PSA     Esophageal reflux     Extremity atherosclerosis with intermittent claudication (HCC)     Extremity atherosclerosis with intermittent claudication ([address] 11/2/2015    GERD (gastroesophageal reflux disease)     medication    Heart disease, unspecified     Hip fracture ([address] 2017    Humeral shaft fracture 2021    right    Hyperlipidemia 11/2/2015    Hypertrophy of prostate with urinary obstruction and other lower urinary tract symptoms (LUTS)     Lumbar spondylolysis     injections every 3-4 months    Pure hypercholesterolemia     Seizures ([address] 2018    no seizures recently, last seizure 2019    Shortness of breath 11/2/2015    Sleep apnea     CPAP nightly    Unresponsive episode 2020     Past Surgical History:   Procedure Laterality Date    CHOLECYSTECTOMY      CORONARY ARTERY BYPASS GRAFT  2001    ORTHOPEDIC SURGERY      knee surgury    TOTAL HIP ARTHROPLASTY Right     TOTAL KNEE ARTHROPLASTY Left     total     Family History   Problem Relation Age of Onset    Cancer Mother     Elevated Lipids Father     Heart Disease Father     Cancer Father       Social History     Tobacco Use    Smoking status: Former Smoker     Packs/day: 2.00     Quit date: 1972     Years since quittin.5    Smokeless tobacco: Never Used   Substance Use Topics    Alcohol use: No       ROS:    Review of Systems   Constitutional: Negative for chills, decreased appetite, diaphoresis, fever, malaise/fatigue and weight gain. HENT: Negative for congestion, hearing loss, hoarse voice and nosebleeds. Eyes: Negative for blurred vision. Cardiovascular: Negative for chest pain, claudication, cyanosis, dyspnea on exertion, irregular heartbeat, leg swelling, near-syncope, orthopnea, palpitations, paroxysmal nocturnal dyspnea and syncope. Respiratory: Negative for chest tightness, shortness of breath, sputum production and wheezing. Endocrine: Negative for polydipsia, polyphagia and polyuria. Skin: Negative for color change. Musculoskeletal: Negative for muscle weakness. Chronic right humeral pain due to fracture   Gastrointestinal: Negative for abdominal pain, bowel incontinence, diarrhea, hematemesis and hematochezia. Genitourinary: Negative for dysuria, frequency and hematuria. Neurological: Negative for dizziness, focal weakness, light-headedness, loss of balance, numbness, sensory change and weakness. Psychiatric/Behavioral: Negative for altered mental status and memory loss. PHYSICAL EXAM:   /66   Pulse 76   Ht 5' 10\" (1.778 m)   Wt 208 lb 12.8 oz (94.7 kg)   BMI 29.96 kg/m²      Physical Exam  Constitutional:       Appearance: Normal appearance. HENT:      Head: Normocephalic and atraumatic. Nose: Nose normal.   Eyes:      Extraocular Movements: Extraocular movements intact.       Pupils: Pupils are equal, round, and reactive to light. Neck:      Vascular: No carotid bruit. Cardiovascular:      Rate and Rhythm: Regular rhythm. Pulses: Normal pulses. Heart sounds: No murmur heard. Pulmonary:      Effort: Pulmonary effort is normal.      Breath sounds: Normal breath sounds. Abdominal:      General: Abdomen is flat. Bowel sounds are normal.      Palpations: Abdomen is soft. Musculoskeletal:         General: Normal range of motion. Cervical back: Normal range of motion and neck supple. Skin:     General: Skin is warm and dry. Neurological:      General: No focal deficit present. Mental Status: He is alert and oriented to person, place, and time. Psychiatric:         Mood and Affect: Mood normal.         Medical problems and test results were reviewed with the patient today.      Recent Results (from the past 672 hour(s))   Transthoracic echocardiogram (TTE) complete with contrast, bubble, strain, and 3D PRN    Collection Time: 06/21/22 10:57 AM   Result Value Ref Range    LV EDV A2C 142 mL    LV EDV A4C 181 mL    LV ESV A2C 66 mL    LV ESV A4C 91 mL    IVSd 0.8 0.6 - 1.0 cm    LVIDd 5.1 4.2 - 5.9 cm    LVIDs 3.3 cm    LVOT Peak Velocity 0.8 m/s    LVOT Peak Gradient 2 mmHg    LVPWd 0.9 0.6 - 1.0 cm    LV E' Lateral Velocity 8 cm/s    LV E' Septal Velocity 7 cm/s    LV Ejection Fraction A2C 53 %    LV Ejection Fraction A4C 50 %    EF BP 51 (A) 55 - 100 %    LA Minor Axis 5.7 cm    LA Major Axis 5.4 cm    LA Area 2C 19.7 cm2    LA Area 4C 18.5 cm2    LA Volume BP 55 18 - 58 mL    LA Diameter 3.9 cm    AV Peak Velocity 1.6 m/s    AV Peak Gradient 11 mmHg    Aortic Root 3.5 cm    MV E Wave Deceleration Time 264.0 ms    MV A Velocity 1.22 m/s    MV E Velocity 0.94 m/s    TR Max Velocity 2.68 m/s    TR Peak Gradient 29 mmHg    Body Surface Area 2.16 m2    Fractional Shortening 2D 35 28 - 44 %    LV ESV Index A4C 43 mL/m2    LV EDV Index A4C 85 mL/m2    LV ESV Index A2C 31 mL/m2    LV EDV Index A2C 67 mL/m2    LVIDd Index 2.41 cm/m2    LVIDs Index 1.56 cm/m2    LV RWT Ratio 0.35     LV Mass 2D 151.8 88 - 224 g    LV Mass 2D Index 71.6 49 - 115 g/m2    MV E/A 0.77     E/E' Ratio (Averaged) 12.59     E/E' Lateral 11.75     E/E' Septal 13.43     LA Volume Index BP 26 16 - 34 ml/m2    LA Size Index 1.84 cm/m2    LA/AO Root Ratio 1.11     Ao Root Index 1.65 cm/m2    AV Velocity Ratio 0.50     RV Basal Dimension 3.4 cm    RV Mid Dimension 2.8 cm    TAPSE 1.7 1.7 cm    Est. RA Pressure 8 mmHg    RVSP 37 mmHg    Ascending Aorta 4.3 cm    Ascending Aorta Index 2.03 cm/m2     No results found for: CHOL, CHOLPOCT, CHOLX, CHLST, CHOLV, HDL, HDLPOC, HDLC, LDL, LDLC, VLDLC, VLDL, TGLX, TRIGL  Results for orders placed or performed in visit on 07/14/22   EKG 12 Lead    Impression    Sinus  Rhythm   -Incomplete right bundle branch block. ABNORMAL       ASSESSMENT and PLAN    Reina Juarez was seen today for coronary artery disease and hyperlipidemia. Diagnoses and all orders for this visit:    CAD (coronary artery disease), native coronary artery:Stable . Continue ASA and Lipitor.  -     EKG 12 Lead          Disposition:    Return in about 6 months (around 1/14/2023).                 Nichole Adair MD  7/14/2022  10:14 AM

## 2022-08-10 ENCOUNTER — TELEPHONE (OUTPATIENT)
Dept: ORTHOPEDIC SURGERY | Age: 82
End: 2022-08-10

## 2022-08-10 DIAGNOSIS — M54.17 LUMBOSACRAL RADICULOPATHY: Primary | ICD-10-CM

## 2022-08-10 DIAGNOSIS — M47.816 LUMBAR SPONDYLOSIS: ICD-10-CM

## 2022-08-10 NOTE — TELEPHONE ENCOUNTER
Called and scheduled patient for repeat injections with DR. Marito Lunsford on August 24th at 3:00 ES

## 2022-08-17 NOTE — PROGRESS NOTES
[address] [place] [address] Court, [address] [facility]  [phone]    Patient Name:  Lj Colbert  YOB: 1940      Office Visit 8/18/2022    CHIEF COMPLAINT:    Chief Complaint   Patient presents with    CPAP/BiPAP    Sleep Apnea         HISTORY OF PRESENT ILLNESS:      Patient is a 80 yo male seen today for follow up of Complex Sleep Apnea Syndrome and ASV therapy. He is prescribed ASV therapy with a humidifier set at 10-15cm  With PS 0-8 with a full face mask. Most recent download reveals  AHI on PAP therapy is 18.1 and the hourly usage is 7 hours 11 minutes nightly. The overall use is 85/90 days. Since initially starting ASV therapy, the patient's AHI was 24 and his pressure was adjusted to the current settings of 10-15 with PS 0-8. At his next appointment after pressure adjustments his AHI was down to 16. He was then seen again in February of this year and his AHI was up to 26 however he was having problems with leaks around the fullface mask. Today he reports that he has been feeling well and sleeping throughout the night and is not having as many leaks because he started using a comfort cover. Patient's daughter is in room during exam.  She reports that the patient has been using a full facemask again with a comfort cover after last visit. This seems to be doing well as he is sleeping throughout the night and getting up 1-2 times. She reports that his machine is very old and that it was recalled and they have been waiting on the replacement machine. Patient states the machine is making more noise than it used to. He does complain of some dry mouth in the morning when he wakes up. Daughter reports that they live in a house that did not have air conditioning but they recently got air conditioning. Humidifier is set on auto. Patient denies any recent gain or loss in weight. States he tries to stay right around 200 pounds.   Blood pressure is controlled today. Saint Edward score is 11/24. Patient denies excessive daytime fatigue. States that he may occasionally take a nap during the day. Patient had echo in June that showed an EF of 51%.         Sleep Medicine 8/18/2022 5/18/2022 1/20/2022 10/8/2021 7/9/2021   Sitting and reading 3 2 3 3 3   Watching TV 3 2 3 3 3   Sitting, inactive in a public place (e.g. a theatre or a meeting) 0 0 1 2 0   As a passenger in a car for an hour without a break 0 0 0 0 0   Lying down to rest in the afternoon when circumstances permit 3 2 3 3 3   Sitting and talking to someone 0 0 0 0 0   Sitting quietly after a lunch without alcohol 2 3 3 3 3   In a car, while stopped for a few minutes in traffic 0 0 0 0 0   Total score 11 9 13 14 12       Past Medical History:   Diagnosis Date    Adverse effect of anesthesia     very slow to wake up    Anemia     Arteriosclerosis of coronary artery bypass graft 11/2/2015    stents x 8, Dr. Mayank Bird     back, recieves injections    Asthma     inhaler     BPH (benign prostatic hyperplasia)     CAD (coronary artery disease) 2001    MI - CABG x 4    Central sleep apnea     [facility] follows    Chronic kidney disease     stage 3    Chronic obstructive pulmonary disease ([address]     Breo inhaler,  [facility] follows    Coronary atherosclerosis of native coronary artery 11/2/2015    Diabetes ([address]     not on medications; 11/22/21 A1c 6.7    Elevated prostate specific antigen (PSA)     Elevated PSA     Esophageal reflux     Extremity atherosclerosis with intermittent claudication (HCC)     Extremity atherosclerosis with intermittent claudication ([address] 11/2/2015    GERD (gastroesophageal reflux disease)     medication    Heart disease, unspecified     Hip fracture ([address] 2017    Humeral shaft fracture 2021    right    Hyperlipidemia 11/2/2015    Hypertrophy of prostate with urinary obstruction and other lower urinary tract symptoms (LUTS)     Lumbar spondylolysis     injections every 3-4 months    Pure hypercholesterolemia     Seizures ([facility] 2018    no seizures recently, last seizure 2019    Shortness of breath 11/2/2015    Sleep apnea     CPAP nightly    Unresponsive episode 2020         Patient Active Problem List   Diagnosis    Unresponsive episode    Extremity atherosclerosis with intermittent claudication (HCC)    Vertigo    Mixed hyperlipidemia    Fatigue    Nausea and vomiting    Hyperlipidemia    Non-intractable cyclical vomiting with nausea    Primary localized osteoarthrosis, lower leg    Chest pain    Elevated prostate specific antigen (PSA)    Pure hypercholesterolemia    Esophageal reflux    BPH with obstruction/lower urinary tract symptoms    Loss of consciousness (HCC)    Tear of medial meniscus of knee joint    Mild cognitive impairment    Benign neoplasm of colon    Type 2 diabetes mellitus with diabetic peripheral angiopathy without gangrene, without long-term current use of insulin (HCC)    Dyspnea on exertion    CAD (coronary artery disease), native coronary artery    Acute cystitis    Esophageal stenosis    Coronary atherosclerosis of native coronary artery    Arteriosclerosis of coronary artery bypass graft    Shortness of breath    Orthostatic lightheadedness    GERD (gastroesophageal reflux disease)    Complex sleep apnea syndrome    Hip fx (HCC)    Arthritis    Heart disease, unspecified          Past Surgical History:   Procedure Laterality Date    CHOLECYSTECTOMY      CORONARY ARTERY BYPASS GRAFT  2001    ORTHOPEDIC SURGERY      knee surgury    TOTAL HIP ARTHROPLASTY Right     TOTAL KNEE ARTHROPLASTY Left     total           Social History     Socioeconomic History    Marital status:      Spouse name: Not on file    Number of children: Not on file    Years of education: Not on file    Highest education level: Not on file   Occupational History    Not on file   Tobacco Use    Smoking status: Former     Packs/day: 2.00     Types: Cigarettes     Quit date: 1/1/1972 Years since quittin.6    Smokeless tobacco: Never   Substance and Sexual Activity    Alcohol use: No    Drug use: Never    Sexual activity: Not on file   Other Topics Concern    Not on file   Social History Narrative    Not on file     Social Determinants of Health     Financial Resource Strain: Not on file   Food Insecurity: Not on file   Transportation Needs: Not on file   Physical Activity: Not on file   Stress: Not on file   Social Connections: Not on file   Intimate Partner Violence: Not on file   Housing Stability: Not on file         Family History   Problem Relation Age of Onset    Cancer Mother     Elevated Lipids Father     Heart Disease Father     Cancer Father          Allergies   Allergen Reactions    Promethazine Anaphylaxis    Codeine Nausea And Vomiting         Current Outpatient Medications   Medication Sig    alendronate (FOSAMAX) 70 MG tablet Take 70 mg by mouth once a week    Calcium Carbonate-Vit D-Min (CALCIUM 1200 PO) Take by mouth daily     albuterol sulfate  (90 Base) MCG/ACT inhaler Inhale 2 puffs into the lungs every 4 hours as needed    aspirin 81 MG chewable tablet Take 81 mg by mouth daily    atorvastatin (LIPITOR) 80 MG tablet Take 80 mg by mouth daily    Cetirizine HCl (ZYRTEC ALLERGY) 10 MG CAPS Take by mouth daily    Coenzyme Q10 75 MG CAPS Take 400 mg by mouth daily    Fluticasone furoate-vilanterol (BREO ELLIPTA) 200-25 MCG/INH AEPB inhaler TAKE 1 PUFF BY MOUTH EVERY DAY    ibuprofen (ADVIL;MOTRIN) 200 MG tablet Take 400 mg by mouth as needed    levETIRAcetam (KEPPRA) 500 MG tablet Take 500 mg by mouth 2 times daily    pantoprazole (PROTONIX) 40 MG tablet Take 40 mg by mouth daily     No current facility-administered medications for this visit. REVIEW OF SYSTEMS:   CONSTITUTIONAL:   There is no history of fever, chills, night sweats, weight loss, weight gain, persistent fatigue, or lethargy/hypersomnolence.    CARDIAC:   No chest pain, pressure, discomfort, palpitations, orthopnea, murmurs, or edema. GI:   No dysphagia, heartburn reflux, nausea/vomiting, diarrhea, abdominal pain, or bleeding. NEURO:   There is no history of AMS, persistent headache, decreased level of consciousness, seizures, or motor or sensory deficits. PHYSICAL EXAM:    Vitals:    08/18/22 1302   BP: (!) 106/58   Pulse: 58   Resp: 16   Temp: 98.4 °F (36.9 °C)   SpO2: 94%   Weight: 214 lb 1.6 oz (97.1 kg)   Height: 5' 10\" (1.778 m)        [unfilled]        GENERAL APPEARANCE:   The patient is normal weight and in no respiratory distress. HEENT:   PERRL. Conjunctivae unremarkable. Nasal mucosa is without epistaxis, exudate, or polyps. Nares patent bilateral.  Gums and dentition are unremarkable. There is oropharyngeal narrowing. Schwartz score 3. NECK/LYMPHATIC:   Symmetrical with no elevation of jugular venous pulsation. Trachea midline. No thyroid enlargement. No cervical adenopathy. LUNGS:   Normal respiratory effort with symmetrical lung expansion. Breath sounds clear. HEART:   There is a regular rate and rhythm. No murmur, rub, or gallop. There is no edema in the lower extremities. ABDOMEN:   Soft and non-tender. No hepatosplenomegaly. Bowel sounds are normal.     NEURO:   The patient is alert and oriented to person, place, and time. Memory appears intact and mood is normal.  No gross sensorimotor deficits are present. ASSESSMENT:  (Medical Decision Making)      Diagnosis Orders   1. Complex sleep apnea syndrome  DME - [address] - Patient is using, compliant and benefiting from Pap therapy. Continue current settings as AHI is down to 18.0 events per hour. Mask leaks are reduced per patient and his AHI is better than previous visit about 6 months ago. Will order new ASV machine and supplies today due to patient's machine making loud noise and being on recall.               PLAN:    Continue ASV with nightly compliance  Start Biotene mouth wash to help with dry mouth  Will order new supplies and machine today  Get echo in 1 year before next follow up appt. Follow up in 1 year or sooner if needed. Orders Placed This Encounter   Procedures    DME - [address] [place]  Phone: [phone] [address] [address]  Dept: [phone]      Patient Name: Andrew Syed  : 1940  Gender: male  Address: [place] [id number]  Patient phone number: [phone] (home)         Primary Insurance: Payor: MEDICARE / Plan: MEDICARE PART A AND B / Product Type: *No Product type* /   Subscriber ID: [id number] - (Medicare)      AMB Supply Order  Order Details     DME Location: [facility]   Order Date: 2022   The encounter diagnosis was Complex sleep apnea syndrome.           (  X   )New Set-Up     ASV machine   (     )[id number] CPAP Unit  (     ) Auto CPAP Unit  (     ) BiLevel Unit  (     ) Auto BiLevel Unit  ( x   ) ASV         Length of need: 12 months    Pressure: ASV 10-15cm  H2O with PS 0-8cm H2O    EPR:   Ramp Time:   min    Starting Ramp Pressure:    cm H20    Patient had a diagnostic Apnea Hypopnea Index (AHI) of :  89    *SUPPLIES* Replace all as needed, or per coverage guidelines     Masks Type: Full Face Mask    ( x  ) -Full Face Mask (1 per 3 mon)  (  x  ) -Full Mask (1 per month) Interface/Cushion      (    ) -Nasal Mask (1 per 3 mon)  (    ) - Nasal Mask (1 per month) Interface/Cushion  (    ) -Pillow (2 per mon)  (    ) -Dnqdybgbo (1 per 6 mon)      _________________________________________________________________          Other Supplies:    (  X   )-Fycwrffp (1 per 6 mon)  ( X    )-Qmzjhi Tubing (1 per 3 mon)  (  X   )- Disposable Filter (2 per mon)  (   X  )-Gxcunz Humidifier (1 per year)     (     )-Jnxwolpcf (sometimes used with Full Face Mask) (1 per 6 mos)  ( )-Tubing-without heat (1 per 3 mos)  (     )-Non-Disposable Filter (1 per 6 mos)  (     )-Water Chamber (1 per 6 mos)  (     )-Humidifier non-heated (1 per 5 yrs)      Signed Date: 8/18/2022  Electronically Signed By: CINDY Torrez CNP     No orders of the defined types were placed in this encounter. Collaborating Physician: Dr. Steve Hanks    Over 50% of today's office visit was spent in face to face time reviewing test results, prognosis, importance of compliance, education about disease process, benefits of medications, instructions for management of acute flare-ups, and follow up plans. Total face to face time spent with patient was 20 minutes.         CINDY Torrez CNP  Electronically signed

## 2022-08-18 ENCOUNTER — OFFICE VISIT (OUTPATIENT)
Dept: SLEEP MEDICINE | Age: 82
End: 2022-08-18
Payer: MEDICARE

## 2022-08-18 VITALS
HEIGHT: 70 IN | RESPIRATION RATE: 16 BRPM | BODY MASS INDEX: 30.65 KG/M2 | DIASTOLIC BLOOD PRESSURE: 58 MMHG | TEMPERATURE: 98.4 F | OXYGEN SATURATION: 94 % | WEIGHT: 214.1 LBS | SYSTOLIC BLOOD PRESSURE: 106 MMHG | HEART RATE: 58 BPM

## 2022-08-18 DIAGNOSIS — G47.31 COMPLEX SLEEP APNEA SYNDROME: Primary | ICD-10-CM

## 2022-08-18 PROCEDURE — 99213 OFFICE O/P EST LOW 20 MIN: CPT | Performed by: NURSE PRACTITIONER

## 2022-08-18 PROCEDURE — 1123F ACP DISCUSS/DSCN MKR DOCD: CPT | Performed by: NURSE PRACTITIONER

## 2022-08-18 ASSESSMENT — SLEEP AND FATIGUE QUESTIONNAIRES
HOW LIKELY ARE YOU TO NOD OFF OR FALL ASLEEP IN A CAR, WHILE STOPPED FOR A FEW MINUTES IN TRAFFIC: 0
HOW LIKELY ARE YOU TO NOD OFF OR FALL ASLEEP WHILE WATCHING TV: 3
HOW LIKELY ARE YOU TO NOD OFF OR FALL ASLEEP WHILE SITTING AND READING: 3
HOW LIKELY ARE YOU TO NOD OFF OR FALL ASLEEP WHILE SITTING INACTIVE IN A PUBLIC PLACE: 0
ESS TOTAL SCORE: 11
HOW LIKELY ARE YOU TO NOD OFF OR FALL ASLEEP WHILE SITTING QUIETLY AFTER LUNCH WITHOUT ALCOHOL: 2
HOW LIKELY ARE YOU TO NOD OFF OR FALL ASLEEP WHILE LYING DOWN TO REST IN THE AFTERNOON WHEN CIRCUMSTANCES PERMIT: 3
HOW LIKELY ARE YOU TO NOD OFF OR FALL ASLEEP WHEN YOU ARE A PASSENGER IN A CAR FOR AN HOUR WITHOUT A BREAK: 0
HOW LIKELY ARE YOU TO NOD OFF OR FALL ASLEEP WHILE SITTING AND TALKING TO SOMEONE: 0

## 2022-08-18 NOTE — PATIENT INSTRUCTIONS
Continue ASV with nightly compliance  Start Biotene mouth wash to help with dry mouth  Will order new supplies and machine today  Get echo in 1 year before next follow up appt. Follow up in 1 year or sooner if needed.

## 2022-08-24 ENCOUNTER — OFFICE VISIT (OUTPATIENT)
Dept: ORTHOPEDIC SURGERY | Age: 82
End: 2022-08-24
Payer: MEDICARE

## 2022-08-24 DIAGNOSIS — M54.17 LUMBOSACRAL RADICULOPATHY: ICD-10-CM

## 2022-08-24 DIAGNOSIS — M47.816 LUMBAR SPONDYLOSIS: Primary | ICD-10-CM

## 2022-08-24 PROCEDURE — 62323 NJX INTERLAMINAR LMBR/SAC: CPT | Performed by: PHYSICAL MEDICINE & REHABILITATION

## 2022-08-24 PROCEDURE — 64493 INJ PARAVERT F JNT L/S 1 LEV: CPT | Performed by: PHYSICAL MEDICINE & REHABILITATION

## 2022-08-24 RX ORDER — TRIAMCINOLONE ACETONIDE 40 MG/ML
220 INJECTION, SUSPENSION INTRA-ARTICULAR; INTRAMUSCULAR ONCE
Status: COMPLETED | OUTPATIENT
Start: 2022-08-24 | End: 2022-08-24

## 2022-08-24 RX ADMIN — TRIAMCINOLONE ACETONIDE 220 MG: 40 INJECTION, SUSPENSION INTRA-ARTICULAR; INTRAMUSCULAR at 15:47

## 2022-08-24 NOTE — PROGRESS NOTES
Date: 08/24/22   Name: Malou LORA [facility]    Pre-Procedural Diagnosis:    Diagnosis Orders   1. Lumbar spondylosis  FL INJ LUMB/SAC FACET SINGLE LEVEL      2. Lumbosacral radiculopathy  XR INJ SPINE THER SUBST LUM/SAC W IMG    FL INJ LUMB/SAC FACET SINGLE LEVEL          Procedure: Lumbar Epidural Steroid Injection (FABIO) with facet injections    Precautions:  [facility] Precautions spine injections: None. Patient denies any prior sensitivity to steroid, local anesthetic, contrast dye, iodine or shellfish. The procedure was discussed at length with the patient and informed consent was signed. The patient was placed in a prone position on the fluoroscopy table and the skin was prepped and draped in a routine sterile fashion. The areas to be injected were each anesthetized with approximately 5 cc of 1% Lidocaine. Initially a 22-gauge 3.5 inch inch spinal needle was carefully advanced under fluoroscopic guidance to the left L5-S1 epidural space. At this time 0.25 cc of omnipaque administered. . Once proper placement was confirmed, 3 cc of sterile water and 100 mg of Kenalog were injected through the spinal needle. The areas to be injected were each anesthetized with approximately 5cc of 1% Lidocaine. Initially a 22-gauge 3.5 inch   inch spinal needle was carefully advanced under fluoroscopic guidance to the bilateral L5-S1 facet joint space. Once proper placement was confirmed, 2 cc of 0.25% Marcaine and 60 mg of Kenalog were injected through the spinal needle at each site. Fluoroscopic guidance was used intermittently over a 10-minute period to insure proper needle placement and patient safety. A hard copy of the fluoroscopic  images has been placed in the patient's chart. The patient was monitored after the procedure and discharged home without complication.      Resume Meds:  Patient to resume asa 81 mg in the AM.    Dennise Shahid MD  08/24/22

## 2022-09-20 ENCOUNTER — OFFICE VISIT (OUTPATIENT)
Dept: ORTHOPEDIC SURGERY | Age: 82
End: 2022-09-20
Payer: MEDICARE

## 2022-09-20 DIAGNOSIS — M47.816 SPONDYLOSIS OF LUMBAR REGION WITHOUT MYELOPATHY OR RADICULOPATHY: Primary | ICD-10-CM

## 2022-09-20 DIAGNOSIS — M54.16 LUMBAR RADICULOPATHY: ICD-10-CM

## 2022-09-20 PROCEDURE — G8417 CALC BMI ABV UP PARAM F/U: HCPCS | Performed by: PHYSICAL MEDICINE & REHABILITATION

## 2022-09-20 PROCEDURE — 1036F TOBACCO NON-USER: CPT | Performed by: PHYSICAL MEDICINE & REHABILITATION

## 2022-09-20 PROCEDURE — 99213 OFFICE O/P EST LOW 20 MIN: CPT | Performed by: PHYSICAL MEDICINE & REHABILITATION

## 2022-09-20 PROCEDURE — G8428 CUR MEDS NOT DOCUMENT: HCPCS | Performed by: PHYSICAL MEDICINE & REHABILITATION

## 2022-09-20 PROCEDURE — 1123F ACP DISCUSS/DSCN MKR DOCD: CPT | Performed by: PHYSICAL MEDICINE & REHABILITATION

## 2022-11-14 ENCOUNTER — OFFICE VISIT (OUTPATIENT)
Dept: PULMONOLOGY | Age: 82
End: 2022-11-14
Payer: MEDICARE

## 2022-11-14 VITALS
OXYGEN SATURATION: 95 % | BODY MASS INDEX: 33.12 KG/M2 | RESPIRATION RATE: 14 BRPM | HEART RATE: 93 BPM | SYSTOLIC BLOOD PRESSURE: 131 MMHG | WEIGHT: 211 LBS | HEIGHT: 67 IN | DIASTOLIC BLOOD PRESSURE: 79 MMHG | TEMPERATURE: 98 F

## 2022-11-14 DIAGNOSIS — G47.31 PRIMARY CENTRAL SLEEP APNEA: ICD-10-CM

## 2022-11-14 DIAGNOSIS — J44.9 CHRONIC OBSTRUCTIVE PULMONARY DISEASE, UNSPECIFIED COPD TYPE (HCC): ICD-10-CM

## 2022-11-14 DIAGNOSIS — J40 BRONCHITIS: ICD-10-CM

## 2022-11-14 DIAGNOSIS — G47.33 OBSTRUCTIVE SLEEP APNEA (ADULT) (PEDIATRIC): Primary | ICD-10-CM

## 2022-11-14 PROCEDURE — 1036F TOBACCO NON-USER: CPT | Performed by: INTERNAL MEDICINE

## 2022-11-14 PROCEDURE — G8484 FLU IMMUNIZE NO ADMIN: HCPCS | Performed by: INTERNAL MEDICINE

## 2022-11-14 PROCEDURE — 3023F SPIROM DOC REV: CPT | Performed by: INTERNAL MEDICINE

## 2022-11-14 PROCEDURE — G8417 CALC BMI ABV UP PARAM F/U: HCPCS | Performed by: INTERNAL MEDICINE

## 2022-11-14 PROCEDURE — 1123F ACP DISCUSS/DSCN MKR DOCD: CPT | Performed by: INTERNAL MEDICINE

## 2022-11-14 PROCEDURE — G8427 DOCREV CUR MEDS BY ELIG CLIN: HCPCS | Performed by: INTERNAL MEDICINE

## 2022-11-14 PROCEDURE — 99214 OFFICE O/P EST MOD 30 MIN: CPT | Performed by: INTERNAL MEDICINE

## 2022-11-14 RX ORDER — PREDNISONE 10 MG/1
TABLET ORAL
Qty: 30 TABLET | Refills: 0 | Status: SHIPPED | OUTPATIENT
Start: 2022-11-14

## 2022-11-14 RX ORDER — ALBUTEROL SULFATE 90 UG/1
2 AEROSOL, METERED RESPIRATORY (INHALATION) EVERY 4 HOURS PRN
Qty: 18 G | Refills: 11 | Status: SHIPPED | OUTPATIENT
Start: 2022-11-14

## 2022-11-14 NOTE — PROGRESS NOTES
[facility]: FOLLOW-UP Patient Office Visit Note  [address] Rahda Rodriguez. [address], [address] [facility]  [phone]    Patient Name:  Johan Colbert  YOB: 1940            Date of Service:  11/14/2022    Chief Complaint   Patient presents with    COPD       History of Present Illness:  Sarah Blanca is an 62-year-old white male with a history of sleep apnea and COPD his last visit was a year ago. Since that time his condition has been fairly stable. He is using Breo and albuterol. A month ago he developed bronchitis and continues to have a cough and some wheezing. He did not take antibiotics and did not take steroids for that. He has not noted significant increase in shortness of breath. He does have complex sleep apnea and is on a ASV machine. He says he is compliant with it.     Past Medical History:   Diagnosis Date    Adverse effect of anesthesia     very slow to wake up    Anemia     Arteriosclerosis of coronary artery bypass graft 11/2/2015    stents x 8, Dr. Latosha Baker     back, recieves injections    Asthma     inhaler     BPH (benign prostatic hyperplasia)     CAD (coronary artery disease) 2001    MI - CABG x 4    Central sleep apnea     [facility] follows    Chronic kidney disease     stage 3    Chronic obstructive pulmonary disease ([address]     Breo inhaler,  [facility] follows    Coronary atherosclerosis of native coronary artery 11/2/2015    Diabetes ([address]     not on medications; 11/22/21 A1c 6.7    Elevated prostate specific antigen (PSA)     Elevated PSA     Esophageal reflux     Extremity atherosclerosis with intermittent claudication (HCC)     Extremity atherosclerosis with intermittent claudication ([address] 11/2/2015    GERD (gastroesophageal reflux disease)     medication    Heart disease, unspecified     Hip fracture ([address] 2017    Humeral shaft fracture 2021    right    Hyperlipidemia 11/2/2015    Hypertrophy of prostate with urinary obstruction and other lower urinary tract symptoms (LUTS)     Lumbar spondylolysis     injections every 3-4 months    Pure hypercholesterolemia     Seizures ([facility] 2018    no seizures recently, last seizure 2019    Shortness of breath 11/2/2015    Sleep apnea     CPAP nightly    Unresponsive episode 2020       Patient Active Problem List   Diagnosis    Unresponsive episode    Extremity atherosclerosis with intermittent claudication (HCC)    Vertigo    Mixed hyperlipidemia    Fatigue    Nausea and vomiting    Hyperlipidemia    Non-intractable cyclical vomiting with nausea    Primary localized osteoarthrosis, lower leg    Chest pain    Elevated prostate specific antigen (PSA)    Pure hypercholesterolemia    Esophageal reflux    BPH with obstruction/lower urinary tract symptoms    Loss of consciousness (HCC)    Tear of medial meniscus of knee joint    Mild cognitive impairment    Benign neoplasm of colon    Type 2 diabetes mellitus with diabetic peripheral angiopathy without gangrene, without long-term current use of insulin (HCC)    Dyspnea on exertion    CAD (coronary artery disease), native coronary artery    Acute cystitis    Esophageal stenosis    Coronary atherosclerosis of native coronary artery    Arteriosclerosis of coronary artery bypass graft    Shortness of breath    Orthostatic lightheadedness    GERD (gastroesophageal reflux disease)    Complex sleep apnea syndrome    Hip fx (HCC)    Arthritis    Heart disease, unspecified         Past Surgical History:   Procedure Laterality Date    CHOLECYSTECTOMY      CORONARY ARTERY BYPASS GRAFT  2001    ORTHOPEDIC SURGERY      knee surgury    TOTAL HIP ARTHROPLASTY Right     TOTAL KNEE ARTHROPLASTY Left     total       Social History     Socioeconomic History    Marital status:      Spouse name: Not on file    Number of children: Not on file    Years of education: Not on file    Highest education level: Not on file   Occupational History    Not on file Tobacco Use    Smoking status: Former     Packs/day: 2.00     Types: Cigarettes     Quit date: 1972     Years since quittin.9    Smokeless tobacco: Never   Substance and Sexual Activity    Alcohol use: No    Drug use: Never    Sexual activity: Not on file   Other Topics Concern    Not on file   Social History Narrative    Not on file     Social Determinants of Health     Financial Resource Strain: Not on file   Food Insecurity: Not on file   Transportation Needs: Not on file   Physical Activity: Not on file   Stress: Not on file   Social Connections: Not on file   Intimate Partner Violence: Not on file   Housing Stability: Not on file       Family History   Problem Relation Age of Onset    Cancer Mother     Elevated Lipids Father     Heart Disease Father     Cancer Father        Allergies   Allergen Reactions    Promethazine Anaphylaxis    Codeine Nausea And Vomiting           Review of Systems    OBJECTIVE:  Physical Exam:  Vitals:    22 1309   BP: 131/79   Pulse: 93   Resp: 14   Temp: 98 °F (36.7 °C)   SpO2: 95%        GENERAL APPEARANCE:   The patient is normal weight and in no respiratory distress. HEENT:   PERRL. Conjunctivae unremarkable. Nasal mucosa is without epistaxis, exudate, or polyps. Gums and dentition are unremarkable. There is no oropharyngeal narrowing. TMs are clear. NECK/LYMPHATIC:   Symmetrical with no elevation of jugular venous pulsation. Trachea midline. No thyroid enlargement. No cervical adenopathy. LUNGS:   Normal respiratory effort with symmetrical lung expansion. Breath sounds end exp. wheeze. HEART:   There is a regular rate and rhythm. No murmur, rub, or gallop. There is no edema in the lower extremities. ABDOMEN:   Soft and non-tender. No hepatosplenomegaly. Bowel sounds are normal.       NEURO:   The patient is alert and oriented to person, place, and time.   Memory appears intact and mood is normal.  No gross sensorimotor deficits are present. Current Outpatient Medications   Medication Instructions    albuterol sulfate  (90 Base) MCG/ACT inhaler 2 puffs, Inhalation, EVERY 4 HOURS PRN    alendronate (FOSAMAX) 70 mg, Oral, WEEKLY    aspirin 81 mg, Oral, DAILY    atorvastatin (LIPITOR) 80 mg, Oral, DAILY    Calcium Carbonate-Vit D-Min (CALCIUM 1200 PO) Oral, DAILY    Cetirizine HCl (ZYRTEC ALLERGY) 10 MG CAPS Oral, DAILY    Coenzyme Q10 400 mg, Oral, DAILY    Fluticasone furoate-vilanterol (BREO ELLIPTA) 200-25 MCG/INH AEPB inhaler TAKE 1 PUFF BY MOUTH EVERY DAY    ibuprofen (ADVIL;MOTRIN) 400 mg, Oral, PRN    levETIRAcetam (KEPPRA) 500 mg, Oral, 2 TIMES DAILY    pantoprazole (PROTONIX) 40 mg, Oral, DAILY    predniSONE (DELTASONE) 10 MG tablet Take 4 tabs x 3 days then 3 tabs x 3 days then 2 tabs x 3 days then 1 tab x 3 days then stop. DIAGNOSTIC TESTS:    CXR:    No results found for this or any previous visit from the past 365 days. CT WITHOUT CONTRAST:    No results found for this or any previous visit from the past 365 days. CT WITH CONTRAST:    No results found for this or any previous visit from the past 365 days. CT HIGH RES:    No results found for this or any previous visit from the past 365 days. CT PE PROTOCOL:    No results found for this or any previous visit from the past 365 days. LDCT SCREENING:    No results found for this or any previous visit from the past 365 days. PET SCAN:    No results found for this or any previous visit from the past 365 days. Spirometry:      No flowsheet data found. Exercise oximetry:          ASSESSMENT:   Diagnosis Orders   1. Obstructive sleep apnea (adult) (pediatric)        2. Primary central sleep apnea        3. Chronic obstructive pulmonary disease, unspecified COPD type (HCC)  Gold stage 1      4.  Bronchitis            PLAN:   Follow up in 1 year with pfts  Continue treatment for sleep apnea, follow-up with sleep clinic    No orders of the defined types were placed in this encounter. Orders Placed This Encounter   Medications    predniSONE (DELTASONE) 10 MG tablet     Sig: Take 4 tabs x 3 days then 3 tabs x 3 days then 2 tabs x 3 days then 1 tab x 3 days then stop.      Dispense:  30 tablet     Refill:  0           Electronically signed by  Monico Umaña MD

## 2023-01-19 ENCOUNTER — OFFICE VISIT (OUTPATIENT)
Dept: CARDIOLOGY CLINIC | Age: 83
End: 2023-01-19

## 2023-01-19 VITALS
DIASTOLIC BLOOD PRESSURE: 74 MMHG | BODY MASS INDEX: 34.01 KG/M2 | WEIGHT: 216.7 LBS | HEIGHT: 67 IN | HEART RATE: 100 BPM | SYSTOLIC BLOOD PRESSURE: 134 MMHG

## 2023-01-19 DIAGNOSIS — E78.5 HYPERLIPIDEMIA, UNSPECIFIED HYPERLIPIDEMIA TYPE: ICD-10-CM

## 2023-01-19 DIAGNOSIS — I25.810 ATHEROSCLEROSIS OF CORONARY ARTERY BYPASS GRAFT OF NATIVE HEART WITHOUT ANGINA PECTORIS: ICD-10-CM

## 2023-01-19 DIAGNOSIS — I25.10 ATHEROSCLEROSIS OF NATIVE CORONARY ARTERY OF NATIVE HEART WITHOUT ANGINA PECTORIS: Primary | ICD-10-CM

## 2023-01-19 ASSESSMENT — ENCOUNTER SYMPTOMS
COLOR CHANGE: 0
ABDOMINAL PAIN: 0
HEMATEMESIS: 0
WHEEZING: 0
BLURRED VISION: 0
BOWEL INCONTINENCE: 0
HEMATOCHEZIA: 0
ORTHOPNEA: 0
DIARRHEA: 0
SHORTNESS OF BREATH: 0
SPUTUM PRODUCTION: 0
HOARSE VOICE: 0

## 2023-01-19 NOTE — PROGRESS NOTES
[facility] CARDIOLOGY  [address], [address] [facility], [address] [place] [address]  PHONE: [phone]        23        NAME:  Andrade Bernardo  : 1940  MRN: [id number]       SUBJECTIVE:   Andrade Bernardo is a 81 y.o. male seen for a follow up visit regarding the following: COPD and CAD. He returns for scheduled follow up. Toni Eaton reports doing well. Since his last OV,he reports resection of a right facial \"tumor\" without event. Chief Complaint   Patient presents with    Follow-up     6mo    Coronary Artery Disease    Hypertension       HPI:    Coronary Artery Disease  Presents for follow-up visit. Pertinent negatives include no chest pain, leg swelling, palpitations or shortness of breath. The symptoms have been stable. Compliance with diet is good. Compliance with exercise is variable. Compliance with medications is good. Hypertension  Pertinent negatives include no blurred vision, chest pain, malaise/fatigue, orthopnea, palpitations, PND or shortness of breath. Past Medical History, Past Surgical History, Family history, Social History, and Medications were all reviewed with the patient today and updated as necessary.          Current Outpatient Medications:     albuterol sulfate HFA (PROVENTIL;VENTOLIN;PROAIR) 108 (90 Base) MCG/ACT inhaler, Inhale 2 puffs into the lungs every 4 hours as needed for Shortness of Breath, Disp: 18 g, Rfl: 11    Fluticasone furoate-vilanterol (BREO ELLIPTA) 200-25 MCG/INH AEPB inhaler, Inhale 1 puff into the lungs daily TAKE 1 PUFF BY MOUTH EVERY DAY, Disp: 1 each, Rfl: 11    alendronate (FOSAMAX) 70 MG tablet, Take 70 mg by mouth once a week, Disp: , Rfl:     Calcium Carbonate-Vit D-Min (CALCIUM 1200 PO), Take by mouth daily , Disp: , Rfl:     aspirin 81 MG chewable tablet, Take 81 mg by mouth daily, Disp: , Rfl:     atorvastatin (LIPITOR) 80 MG tablet, Take 80 mg by mouth daily, Disp: , Rfl:     Cetirizine HCl (ZYRTEC ALLERGY) 10 MG CAPS, Take by mouth daily, Disp: , Rfl:     Coenzyme Q10 75 MG CAPS, Take 400 mg by mouth daily, Disp: , Rfl:     ibuprofen (ADVIL;MOTRIN) 200 MG tablet, Take 400 mg by mouth as needed, Disp: , Rfl:     levETIRAcetam (KEPPRA) 500 MG tablet, Take 500 mg by mouth 2 times daily, Disp: , Rfl:     pantoprazole (PROTONIX) 40 MG tablet, Take 40 mg by mouth daily, Disp: , Rfl:     predniSONE (DELTASONE) 10 MG tablet, Take 4 tabs x 3 days then 3 tabs x 3 days then 2 tabs x 3 days then 1 tab x 3 days then stop. (Patient not taking: Reported on 1/19/2023), Disp: 30 tablet, Rfl: 0  Allergies   Allergen Reactions    Promethazine Anaphylaxis    Codeine Nausea And Vomiting     Past Medical History:   Diagnosis Date    Adverse effect of anesthesia     very slow to wake up    Anemia     Arteriosclerosis of coronary artery bypass graft 11/2/2015    stents x 8, Dr. Blanchard    Arthritis     back, recieves injections    Asthma     inhaler     BPH (benign prostatic hyperplasia)     CAD (coronary artery disease) 2001    MI - CABG x 4    Central sleep apnea     [facility] follows    Chronic kidney disease     stage 3    Chronic obstructive pulmonary disease (HCC)     Breo inhaler,  [facility] follows    Coronary atherosclerosis of native coronary artery 11/2/2015    Diabetes (HCC)     not on medications; 11/22/21 A1c 6.7    Elevated prostate specific antigen (PSA)     Elevated PSA     Esophageal reflux     Extremity atherosclerosis with intermittent claudication (HCC)     Extremity atherosclerosis with intermittent claudication (HCC) 11/2/2015    GERD (gastroesophageal reflux disease)     medication    Heart disease, unspecified     Hip fracture (HCC) 2017    Humeral shaft fracture 2021    right    Hyperlipidemia 11/2/2015    Hypertrophy of prostate with urinary obstruction and other lower urinary tract symptoms (LUTS)     Lumbar spondylolysis     injections every 3-4 months    Pure hypercholesterolemia     Seizures (HCC) 2018    no seizures  recently, last seizure 2019    Shortness of breath 2015    Sleep apnea     CPAP nightly    Unresponsive episode      Past Surgical History:   Procedure Laterality Date    CHOLECYSTECTOMY      CORONARY ARTERY BYPASS GRAFT      ORTHOPEDIC SURGERY      knee surgury    TOTAL HIP ARTHROPLASTY Right     TOTAL KNEE ARTHROPLASTY Left     total     Family History   Problem Relation Age of Onset    Cancer Mother     Elevated Lipids Father     Heart Disease Father     Cancer Father       Social History     Tobacco Use    Smoking status: Former     Packs/day: 2.00     Types: Cigarettes     Quit date: 1972     Years since quittin.0    Smokeless tobacco: Never   Substance Use Topics    Alcohol use: No       ROS:    Review of Systems   Constitutional: Negative for chills, decreased appetite, diaphoresis, fever and malaise/fatigue. HENT:  Negative for congestion, hearing loss, hoarse voice and nosebleeds. Eyes:  Negative for blurred vision. Cardiovascular:  Negative for chest pain, claudication, cyanosis, dyspnea on exertion, irregular heartbeat, leg swelling, near-syncope, orthopnea, palpitations, paroxysmal nocturnal dyspnea and syncope. Respiratory:  Negative for shortness of breath, sputum production and wheezing. Endocrine: Negative for polydipsia, polyphagia and polyuria. Skin:  Negative for color change. Gastrointestinal:  Negative for abdominal pain, bowel incontinence, diarrhea, hematemesis and hematochezia. Genitourinary:  Negative for dysuria, frequency and hematuria. Neurological:  Negative for focal weakness, light-headedness, loss of balance, numbness, sensory change and weakness. Psychiatric/Behavioral:  Negative for altered mental status and memory loss. PHYSICAL EXAM:   /74   Pulse 100   Ht 5' 7\" (1.702 m)   Wt 216 lb 11.2 oz (98.3 kg)   BMI 33.94 kg/m²      Physical Exam  Constitutional:       Appearance: Normal appearance.    HENT:      Head: Normocephalic and atraumatic. Nose: Nose normal.   Eyes:      Extraocular Movements: Extraocular movements intact. Pupils: Pupils are equal, round, and reactive to light. Neck:      Vascular: No carotid bruit. Cardiovascular:      Rate and Rhythm: Regular rhythm. Pulses: Normal pulses. Heart sounds: Murmur (1/6 KARLENE) heard. Pulmonary:      Effort: Pulmonary effort is normal.      Breath sounds: Normal breath sounds. Abdominal:      General: Abdomen is flat. Bowel sounds are normal.      Palpations: Abdomen is soft. Musculoskeletal:         General: Normal range of motion. Cervical back: Normal range of motion and neck supple. Skin:     General: Skin is warm and dry. Neurological:      General: No focal deficit present. Mental Status: He is alert and oriented to person, place, and time. Psychiatric:         Mood and Affect: Mood normal.       Medical problems and test results were reviewed with the patient today. No results found for this or any previous visit (from the past 672 hour(s)). No results found for: CHOL, CHOLPOCT, CHOLX, CHLST, CHOLV, HDL, HDLPOC, HDLC, LDL, LDLC, VLDLC, VLDL, TGLX, TRIGL  No results found for any visits on 01/19/23. ASSESSMENT and PLAN    Jonatan Julio was seen today for follow-up, coronary artery disease and hypertension. Diagnoses and all orders for this visit:    Atherosclerosis of native coronary artery of native heart without angina pectoris:Stable. Continue ASA and statin    Atherosclerosis of coronary artery bypass graft of native heart without angina pectoris:Stable. Continue ASA and statin    Hyperlipidemia, unspecified hyperlipidemia type:Status unknown. Lipids monitored by PCP. Continue Lipitor. Disposition:    Return in about 6 months (around 7/19/2023).                 Lambert Null MD  1/19/2023  10:18 AM

## 2023-01-30 RX ORDER — ATORVASTATIN CALCIUM 80 MG/1
80 TABLET, FILM COATED ORAL DAILY
Qty: 90 TABLET | Refills: 3 | Status: SHIPPED | OUTPATIENT
Start: 2023-01-30 | End: 2023-02-03 | Stop reason: SDUPTHER

## 2023-01-30 NOTE — TELEPHONE ENCOUNTER
MEDICATION REFILL REQUEST      Name of Medication:  Atorvastatin  Dose:  80 mg  Frequency:  1 a day  Quantity:  ?  Days' supply:  ?       Pharmacy Name/Location:  [id number]

## 2023-02-02 ENCOUNTER — PATIENT MESSAGE (OUTPATIENT)
Dept: CARDIOLOGY CLINIC | Age: 83
End: 2023-02-02

## 2023-02-03 RX ORDER — ATORVASTATIN CALCIUM 80 MG/1
80 TABLET, FILM COATED ORAL DAILY
Qty: 90 TABLET | Refills: 3 | Status: SHIPPED | OUTPATIENT
Start: 2023-02-03

## 2023-02-03 NOTE — TELEPHONE ENCOUNTER
From: Jennifer Colbert  To: Dr. Felisha Orantes: 2/2/2023 10:19 PM EST  Subject: Refills went to wrong pharmacy     We changed from [address] to [facility]. At initial check in for last visit we submitted a request to change pharmacy. Is there a problem. Why wasnt it changed on Our record as requested. What do we need to do to fix this. ? Thank you

## 2023-03-07 ENCOUNTER — TELEPHONE (OUTPATIENT)
Dept: ORTHOPEDIC SURGERY | Age: 83
End: 2023-03-07

## 2023-03-07 NOTE — TELEPHONE ENCOUNTER
Pt hazel need inj ,and need a office    visit he fell.  She said dr Vickie Cabezas told them  you would work them in.please call

## 2023-03-08 ENCOUNTER — OFFICE VISIT (OUTPATIENT)
Dept: ORTHOPEDIC SURGERY | Age: 83
End: 2023-03-08

## 2023-03-08 DIAGNOSIS — M54.16 LUMBAR RADICULOPATHY: ICD-10-CM

## 2023-03-08 DIAGNOSIS — M47.816 SPONDYLOSIS OF LUMBAR REGION WITHOUT MYELOPATHY OR RADICULOPATHY: Primary | ICD-10-CM

## 2023-03-08 DIAGNOSIS — M54.50 LUMBAR BACK PAIN: ICD-10-CM

## 2023-03-08 RX ORDER — TRIAMCINOLONE ACETONIDE 40 MG/ML
220 INJECTION, SUSPENSION INTRA-ARTICULAR; INTRAMUSCULAR ONCE
Status: COMPLETED | OUTPATIENT
Start: 2023-03-08 | End: 2023-03-08

## 2023-03-08 RX ADMIN — TRIAMCINOLONE ACETONIDE 220 MG: 40 INJECTION, SUSPENSION INTRA-ARTICULAR; INTRAMUSCULAR at 15:29

## 2023-03-08 NOTE — PROGRESS NOTES
Date: 03/08/23   Name: Fer LORA [facility]    Pre-Procedural Diagnosis:    Diagnosis Orders   1. Spondylosis of lumbar region without myelopathy or radiculopathy  FL INJ LUMB/SAC FACET SINGLE LEVEL    triamcinolone acetonide (KENALOG-40) injection 220 mg      2. Lumbar radiculopathy  XR INJ SPINE THER SUBST LUM/SAC W IMG    triamcinolone acetonide (KENALOG-40) injection 220 mg          Procedure: Lumbar Epidural Steroid Injection (FABIO) with facet injections    Precautions:  [facility] Precautions spine injections: None. Patient denies any prior sensitivity to steroid, local anesthetic, contrast dye, iodine or shellfish. The procedure was discussed at length with the patient and informed consent was signed. The patient was placed in a prone position on the fluoroscopy table and the skin was prepped and draped in a routine sterile fashion. The areas to be injected were each anesthetized with approximately 5 cc of 1% Lidocaine. Initially a 22-gauge 3.5 inch inch spinal needle was carefully advanced under fluoroscopic guidance to the left L5-S1 epidural space. At this time 0.25 cc of omnipaque administered. . Once proper placement was confirmed,1.5 cc of sterile water and 100 mg of Kenalog were injected through the spinal needle. The areas to be injected were each anesthetized with approximately 5cc of 1% Lidocaine. Initially a 22-gauge 3.5 inch   inch spinal needle was carefully advanced under fluoroscopic guidance to the bilateral L5-S1 facet joint space. Once proper placement was confirmed, 2 cc of 0.25% Marcaine and 60 mg of Kenalog were injected through the spinal needle at each site. Fluoroscopic guidance was used intermittently over a 10-minute period to insure proper needle placement and patient safety. A hard copy of the fluoroscopic  images has been placed in the patient's chart. The patient was monitored after the procedure and discharged home without complication.      Resume Meds:  Pt remains on asa 81 mg.    Emerson Patrick MD  03/08/23

## 2023-03-08 NOTE — PROGRESS NOTES
Date:  03/08/23     HPI:  I am seeing Laron Brock in evalution/folowup. I have not seen him since September. He has pain in the lower lumbar paraspinal areas that may gravitate to the right buttock and then down the lateral aspect of the right leg to the knee. No neurologic issues with his other than mild weakness of dorsiflexion on the right it does appear to be stable. His wife feels that sometimes he drags his leg a little bit but do not think this has been a major impairment. Prior MR imaging reveals neuroforaminal narrowing on the right that could explain the symptoms, has seen Dr. Sesar Merino in the past he felt the spine surgery should be a last resort. The combination of bilateral L5-S1 facet injections with a translaminar lumbar FABIO at the L5-S1 level has offered significant benefit. Frequently these injections last over 3 months. The last set of injections were performed 6-1/2 months ago and the pain has come back to the point he would like to have a reinjection. The symptoms are about the same. He did have a fall about 2 or 3 weeks ago where he tripped. He fell on his left knee and arm but is done pretty well with that. He has a prior left TKA but is able to weight-bear and feels that his left elbow pain has improved. Does have some bruising but nothing more than that. ROS: No new problems since last being seen    PE: Cooperative. Good strength in lower extremities with the exception of very slightly depressed dorsiflexion on the right. I do not think this is changed from last visit. No lateralizing findings. Good range of motion of the hips. He has tenderness in the lower lumbar paraspinal areas as well as the right upper buttock    Assessment/Plan:  PREDOMINANTLY MUSCULOSKELETAL LUMBOSACRAL  SPINE PAIN. Facet joint arthropathy with radicular symptoms in a right L5 distribution. Slight weakness of dorsiflexion that is stable. We do not need to reimage.   Follow along and I can repeat injections today as he feels the pain has recurred to the point of requiring them. I will otherwise see him back in less than a year for continuity of care.     Jasmyne Jaffe MD

## 2023-04-17 ENCOUNTER — PATIENT MESSAGE (OUTPATIENT)
Dept: ORTHOPEDIC SURGERY | Age: 83
End: 2023-04-17

## 2023-04-17 DIAGNOSIS — M47.816 SPONDYLOSIS OF LUMBAR REGION WITHOUT MYELOPATHY OR RADICULOPATHY: Primary | ICD-10-CM

## 2023-04-17 DIAGNOSIS — M54.17 LUMBOSACRAL RADICULOPATHY: ICD-10-CM

## 2023-04-17 DIAGNOSIS — M54.50 LUMBAR BACK PAIN: ICD-10-CM

## 2023-04-18 DIAGNOSIS — M47.816 SPONDYLOSIS OF LUMBAR REGION WITHOUT MYELOPATHY OR RADICULOPATHY: Primary | ICD-10-CM

## 2023-04-18 DIAGNOSIS — M54.16 LUMBAR RADICULOPATHY: ICD-10-CM

## 2023-04-18 NOTE — TELEPHONE ENCOUNTER
From: Brianna Colbert  To: Dr. Roderick Gonzalez: 4/17/2023 8:35 PM EDT  Subject: Weakness in leg    Dwayne Kincaid is worried, it's only been six weeks since the last cortisone shot and he feels that the sciatic nerve is being compressed. Dr. Mark Villalta said something about the spinal cord being compressed due to shrinking of a tube around the cord. Weakness in his leg has him feeling that is happening. Do we need to schedule an appointment? How Severe Is It?: moderate Is This A New Presentation, Or A Follow-Up?: Rash

## 2023-04-29 ENCOUNTER — HOSPITAL ENCOUNTER (OUTPATIENT)
Dept: MRI IMAGING | Age: 83
End: 2023-04-29
Payer: MEDICARE

## 2023-04-29 DIAGNOSIS — M54.16 LUMBAR RADICULOPATHY: ICD-10-CM

## 2023-04-29 DIAGNOSIS — M47.816 SPONDYLOSIS OF LUMBAR REGION WITHOUT MYELOPATHY OR RADICULOPATHY: ICD-10-CM

## 2023-04-29 PROCEDURE — 72148 MRI LUMBAR SPINE W/O DYE: CPT

## 2023-05-03 ENCOUNTER — TELEPHONE (OUTPATIENT)
Dept: ORTHOPEDIC SURGERY | Age: 83
End: 2023-05-03

## 2023-05-03 NOTE — TELEPHONE ENCOUNTER
MRI lumbar spine results with patient and his wife. He has pain that may gravitate into the right lower extremity in an L5 greater than L4 distribution. He also has some weakness in the right leg with some sensory change. Facet injections with a translaminar lumbar FABIO at L5-S1 level has offered some benefit. Last injection, not so much. This reveals degenerative changes, significant neuroforaminal narrowing at the L4-L5 level bilaterally, also at L3-L4 level, the latter more on the right. This could explain his current symptoms. Discussed at length. Can offer right L4 and L5 selective nerve root blocks, possible repeat facet injections at the L5-S1 level. This may not help his numbness and weakness, if that worsens but least prompt some discussion of surgical options. There are some concerns over him having anesthesia related complications. We will see how he does with selective nerve root blocks first, possible repeat facet injections and go from there. All parties are in agreement.

## 2023-05-17 ENCOUNTER — OFFICE VISIT (OUTPATIENT)
Dept: ORTHOPEDIC SURGERY | Age: 83
End: 2023-05-17

## 2023-05-17 DIAGNOSIS — M54.16 LUMBAR RADICULOPATHY: Primary | ICD-10-CM

## 2023-05-17 DIAGNOSIS — M47.816 SPONDYLOSIS OF LUMBAR REGION WITHOUT MYELOPATHY OR RADICULOPATHY: ICD-10-CM

## 2023-05-17 RX ORDER — TRIAMCINOLONE ACETONIDE 40 MG/ML
280 INJECTION, SUSPENSION INTRA-ARTICULAR; INTRAMUSCULAR ONCE
Status: COMPLETED | OUTPATIENT
Start: 2023-05-17 | End: 2023-05-17

## 2023-05-17 RX ADMIN — TRIAMCINOLONE ACETONIDE 280 MG: 40 INJECTION, SUSPENSION INTRA-ARTICULAR; INTRAMUSCULAR at 10:31

## 2023-05-17 NOTE — PROGRESS NOTES
Date: 05/17/23   Name: Ana Luisa LORA [facility]    Pre-Procedural Diagnosis:    Diagnosis Orders   1. Lumbar radiculopathy  FL NERVE BLOCK LUMBOSACRAL 1ST    FL NERVE BLOCK LUMBOSACRAL EACH ADD    triamcinolone acetonide (KENALOG-40) injection 280 mg      2. Spondylosis of lumbar region without myelopathy or radiculopathy  FL INJ LUMB/SAC FACET SINGLE LEVEL    triamcinolone acetonide (KENALOG-40) injection 280 mg          Procedure: Selective Nerve Root Blocks (Transforaminal) - Multiple Level with lumbar facet joint injection(s)    Precautions: [facility] Precautions spine injections: None. Patient denies any prior sensitivity to steroid, local anesthetic, contrast dye, iodine or shellfish. The procedure was discussed at length with the patient and informed consent was signed. The patient was placed in a prone position on the fluoroscopy table and the skin was prepped and draped in a routine sterile fashion. The areas to be injected were each anesthetized with approximately 5 cc of 1% Lidocaine. A 22-gauge 3.5 inch spinal needle was carefully advanced under fluoroscopic guidance to the right L4 transforaminal space and subsequently the right L5 transforaminal space. At this time 0.25 cc of omnipaque administered. . Once proper placement was confirmed, 2 cc of 0.25% Marcaine and 80 mg of Kenalog were injected through the spinal needle at each site. After informed oral consent, patient was prepped per routine. The bilateral L5-S1 facet joint(s) were injected. 60 mg of Kenalog with 1 cc of 0.25% Marcaine injected at each site. Patient tolerated well. Band aid(s) applied. Fluoroscopic guidance was used intermittently over a 10-minute period to insure proper needle placement and patient safety. A hard copy of the fluoroscopic  images has been placed in the patient's chart. The patient was monitored after the procedure and discharged home in stable fashion.      A total of 7 cc of Kenalog were administered during this

## 2023-07-24 ENCOUNTER — OFFICE VISIT (OUTPATIENT)
Age: 83
End: 2023-07-24
Payer: MEDICARE

## 2023-07-24 VITALS
WEIGHT: 217 LBS | HEART RATE: 84 BPM | BODY MASS INDEX: 34.06 KG/M2 | SYSTOLIC BLOOD PRESSURE: 128 MMHG | DIASTOLIC BLOOD PRESSURE: 74 MMHG | HEIGHT: 67 IN

## 2023-07-24 DIAGNOSIS — R07.9 CHEST PAIN, UNSPECIFIED TYPE: ICD-10-CM

## 2023-07-24 DIAGNOSIS — I25.10 ATHEROSCLEROSIS OF NATIVE CORONARY ARTERY OF NATIVE HEART, UNSPECIFIED WHETHER ANGINA PRESENT: Primary | ICD-10-CM

## 2023-07-24 DIAGNOSIS — R60.0 LOCALIZED EDEMA: ICD-10-CM

## 2023-07-24 PROCEDURE — 99214 OFFICE O/P EST MOD 30 MIN: CPT | Performed by: INTERNAL MEDICINE

## 2023-07-24 PROCEDURE — 1036F TOBACCO NON-USER: CPT | Performed by: INTERNAL MEDICINE

## 2023-07-24 PROCEDURE — G8417 CALC BMI ABV UP PARAM F/U: HCPCS | Performed by: INTERNAL MEDICINE

## 2023-07-24 PROCEDURE — 1123F ACP DISCUSS/DSCN MKR DOCD: CPT | Performed by: INTERNAL MEDICINE

## 2023-07-24 PROCEDURE — G8427 DOCREV CUR MEDS BY ELIG CLIN: HCPCS | Performed by: INTERNAL MEDICINE

## 2023-07-24 ASSESSMENT — ENCOUNTER SYMPTOMS
WHEEZING: 0
BLURRED VISION: 0
DIARRHEA: 0
ABDOMINAL PAIN: 0
HEMATEMESIS: 0
CHEST TIGHTNESS: 1
COLOR CHANGE: 0
HEMATOCHEZIA: 0
ORTHOPNEA: 0
BOWEL INCONTINENCE: 0
SHORTNESS OF BREATH: 0
SPUTUM PRODUCTION: 0
HOARSE VOICE: 0

## 2023-07-24 NOTE — PROGRESS NOTES
[facility] CARDIOLOGY  [address] [facility], [address]  PHONE: [phone]        23        NAME:  Rebeka Malone  : 1940  MRN: [id number]       SUBJECTIVE:   Rebeka Malone is a 81 y.o. male seen for a follow up visit regarding the following: CAD and COPD. He was seen in the ER in 2023 for complaints of leg swelling. Arvid Annea reports occasional exertional chest pain. Chief Complaint   Patient presents with    Coronary Artery Disease    Hyperlipidemia       HPI:    Coronary Artery Disease  Presents for follow-up visit. Symptoms include chest pain (He reports occasional exertional chest pain.), chest pressure, chest tightness and leg swelling (He was seen in the ER in 2023 for unexplained edema). Pertinent negatives include no dizziness, muscle weakness, palpitations, shortness of breath or weight gain. Risk factors include hyperlipidemia. The symptoms have been stable. Hyperlipidemia  Associated symptoms include chest pain (He reports occasional exertional chest pain. ). Pertinent negatives include no focal weakness or shortness of breath. Past Medical History, Past Surgical History, Family history, Social History, and Medications were all reviewed with the patient today and updated as necessary.          Current Outpatient Medications:     atorvastatin (LIPITOR) 80 MG tablet, Take 1 tablet by mouth daily, Disp: 90 tablet, Rfl: 3    albuterol sulfate HFA (PROVENTIL;VENTOLIN;PROAIR) 108 (90 Base) MCG/ACT inhaler, Inhale 2 puffs into the lungs every 4 hours as needed for Shortness of Breath, Disp: 18 g, Rfl: 11    Fluticasone furoate-vilanterol (BREO ELLIPTA) 200-25 MCG/INH AEPB inhaler, Inhale 1 puff into the lungs daily TAKE 1 PUFF BY MOUTH EVERY DAY, Disp: 1 each, Rfl: 11    alendronate (FOSAMAX) 70 MG tablet, Take 1 tablet by mouth once a week, Disp: , Rfl:     Calcium Carbonate-Vit D-Min (CALCIUM 1200 PO), Take by mouth daily , Disp: , Rfl:     aspirin 81 MG

## 2023-08-16 ENCOUNTER — TELEPHONE (OUTPATIENT)
Dept: SLEEP MEDICINE | Age: 83
End: 2023-08-16

## 2023-08-16 NOTE — TELEPHONE ENCOUNTER
I called patient to remind patient to bring PAP machine to office visit. Patient agreeble. Pt wife states that he has not received his new machine the old machine was recalled. she states they have not received any messages from [facility]. Called to m spoke to Shiraz Mathis she states the note in his chart states they left several messages in March no one responded so the new machine  was sent back. Pt has an appt Friday to try and get this resolved.

## 2023-08-16 NOTE — PROGRESS NOTES
place, and time. Memory appears intact and mood is normal.  No gross sensorimotor deficits are present. ASSESSMENT:  (Medical Decision Making)       ICD-10-CM    1. Complex sleep apnea syndrome  G47.31 Patient is using, compliant and benefiting from Pap therapy. AHI is down to 29.4 events per hour. Patient needs a new ASV machine. Anticipate that his current machine is not working properly as AHI has never been well controlled. We will order a new machine today and we will change his pressures to EPAP 10 cm with a minimum pressure support of 3 and maximum pressure support of 10. PLAN:    Continue ASV EPAP 10-15 cm H2O and pressure support of 0-8 cm H2O with nightly compliance  New ASV machine ordered with new pressure settings of EPAP 10 cm H2O, minimum pressure support of 3 cm H2O, and maximum pressure support of 10 cm H2O. Recommendations as above  Follow-up in 4 months or sooner if needed      Orders Placed This Encounter   Procedures    DME - [address] [facility]  Phone: 5 [facility] [phone] [facility] [address]  Dept: [phone]      Patient Name: Rishi Mackey  : 1940  Gender: male  Address: [address] [place] [id number]  Patient phone number: [phone] (home)       Primary Insurance: Payor: MEDICARE / Plan: MEDICARE PART A AND B / Product Type: *No Product type* /   Subscriber ID: [id number] - (Medicare)      AMB Supply Order  Order Details     DME Location:  [facility]   Order Date: 2023   The encounter diagnosis was Complex sleep apnea syndrome.           (  X   )New Set-Up (Replacement Machine)     ASV machine   <<< Please provide patient with Resmed machine >>>  (     )[id number] CPAP Unit  (     ) Auto CPAP Unit  (     ) BiLevel Unit  (     ) Auto BiLevel Unit  (  x   ) ASV   (     ) Bilevel ST    (     ) Oxygen Concentrator         Length of need: 12

## 2023-08-18 ENCOUNTER — OFFICE VISIT (OUTPATIENT)
Dept: SLEEP MEDICINE | Age: 83
End: 2023-08-18
Payer: MEDICARE

## 2023-08-18 VITALS
BODY MASS INDEX: 34.69 KG/M2 | WEIGHT: 221 LBS | HEART RATE: 97 BPM | SYSTOLIC BLOOD PRESSURE: 110 MMHG | TEMPERATURE: 97.9 F | HEIGHT: 67 IN | OXYGEN SATURATION: 100 % | DIASTOLIC BLOOD PRESSURE: 60 MMHG

## 2023-08-18 DIAGNOSIS — G47.31 COMPLEX SLEEP APNEA SYNDROME: Primary | ICD-10-CM

## 2023-08-18 PROCEDURE — 1123F ACP DISCUSS/DSCN MKR DOCD: CPT | Performed by: NURSE PRACTITIONER

## 2023-08-18 PROCEDURE — G8427 DOCREV CUR MEDS BY ELIG CLIN: HCPCS | Performed by: NURSE PRACTITIONER

## 2023-08-18 PROCEDURE — G8417 CALC BMI ABV UP PARAM F/U: HCPCS | Performed by: NURSE PRACTITIONER

## 2023-08-18 PROCEDURE — 99213 OFFICE O/P EST LOW 20 MIN: CPT | Performed by: NURSE PRACTITIONER

## 2023-08-18 PROCEDURE — 1036F TOBACCO NON-USER: CPT | Performed by: NURSE PRACTITIONER

## 2023-08-18 ASSESSMENT — SLEEP AND FATIGUE QUESTIONNAIRES
HOW LIKELY ARE YOU TO NOD OFF OR FALL ASLEEP WHILE SITTING AND READING: 2
ESS TOTAL SCORE: 8
HOW LIKELY ARE YOU TO NOD OFF OR FALL ASLEEP IN A CAR, WHILE STOPPED FOR A FEW MINUTES IN TRAFFIC: 0
HOW LIKELY ARE YOU TO NOD OFF OR FALL ASLEEP WHILE SITTING QUIETLY AFTER LUNCH WITHOUT ALCOHOL: 2
HOW LIKELY ARE YOU TO NOD OFF OR FALL ASLEEP WHILE SITTING INACTIVE IN A PUBLIC PLACE: 0
HOW LIKELY ARE YOU TO NOD OFF OR FALL ASLEEP WHILE WATCHING TV: 2
HOW LIKELY ARE YOU TO NOD OFF OR FALL ASLEEP WHILE LYING DOWN TO REST IN THE AFTERNOON WHEN CIRCUMSTANCES PERMIT: 2
HOW LIKELY ARE YOU TO NOD OFF OR FALL ASLEEP WHEN YOU ARE A PASSENGER IN A CAR FOR AN HOUR WITHOUT A BREAK: 0
HOW LIKELY ARE YOU TO NOD OFF OR FALL ASLEEP WHILE SITTING AND TALKING TO SOMEONE: 0

## 2023-08-18 NOTE — PATIENT INSTRUCTIONS
Continue ASV EPAP 10-15 cm H2O and pressure support of 0-8 cm H2O with nightly compliance  New ASV machine ordered with new pressure settings of EPAP 10 cm H2O, minimum pressure support of 3 cm H2O, and maximum pressure support of 10 cm H2O.   Recommendations as above  Follow-up in 4 months or sooner if needed

## 2023-09-07 ENCOUNTER — OFFICE VISIT (OUTPATIENT)
Age: 83
End: 2023-09-07
Payer: MEDICARE

## 2023-09-07 VITALS
WEIGHT: 219.4 LBS | HEART RATE: 80 BPM | BODY MASS INDEX: 34.44 KG/M2 | HEIGHT: 67 IN | DIASTOLIC BLOOD PRESSURE: 70 MMHG | SYSTOLIC BLOOD PRESSURE: 138 MMHG

## 2023-09-07 DIAGNOSIS — I35.0 NONRHEUMATIC AORTIC VALVE STENOSIS: ICD-10-CM

## 2023-09-07 DIAGNOSIS — I25.10 CORONARY ARTERY DISEASE INVOLVING NATIVE CORONARY ARTERY OF NATIVE HEART WITHOUT ANGINA PECTORIS: Primary | ICD-10-CM

## 2023-09-07 PROCEDURE — G8427 DOCREV CUR MEDS BY ELIG CLIN: HCPCS | Performed by: INTERNAL MEDICINE

## 2023-09-07 PROCEDURE — 1036F TOBACCO NON-USER: CPT | Performed by: INTERNAL MEDICINE

## 2023-09-07 PROCEDURE — 99213 OFFICE O/P EST LOW 20 MIN: CPT | Performed by: INTERNAL MEDICINE

## 2023-09-07 PROCEDURE — 1123F ACP DISCUSS/DSCN MKR DOCD: CPT | Performed by: INTERNAL MEDICINE

## 2023-09-07 PROCEDURE — G8417 CALC BMI ABV UP PARAM F/U: HCPCS | Performed by: INTERNAL MEDICINE

## 2023-09-07 ASSESSMENT — PATIENT HEALTH QUESTIONNAIRE - PHQ9
SUM OF ALL RESPONSES TO PHQ QUESTIONS 1-9: 0
2. FEELING DOWN, DEPRESSED OR HOPELESS: 0
SUM OF ALL RESPONSES TO PHQ9 QUESTIONS 1 & 2: 0
SUM OF ALL RESPONSES TO PHQ QUESTIONS 1-9: 0
1. LITTLE INTEREST OR PLEASURE IN DOING THINGS: 0

## 2023-09-07 ASSESSMENT — ENCOUNTER SYMPTOMS
COLOR CHANGE: 0
BOWEL INCONTINENCE: 0
CHEST TIGHTNESS: 0
HEMATOCHEZIA: 0
SPUTUM PRODUCTION: 0
BLURRED VISION: 0
ORTHOPNEA: 0
HOARSE VOICE: 0
WHEEZING: 0
SHORTNESS OF BREATH: 0
ABDOMINAL PAIN: 0
HEMATEMESIS: 0
DIARRHEA: 0

## 2023-09-07 NOTE — PROGRESS NOTES
[facility] CARDIOLOGY  [address] [place] [address]  PHONE: [phone]        23        NAME:  Laron Branch  : 1940  MRN: [id number]       SUBJECTIVE:   Laron Branch is a 81 y.o. male seen for a follow up visit regarding the following: CAD and COPD. On his last OV,he reported an ER visit for chest pain evaluation in 2023. I suggested a follow up [address] and echo. [address] in 2023 reported no ischemia with normal perfusion and LV EF=67%. Follow up echo suggested mild global HK with EF 45-50%,mild AS,mild MR,and mild aortic root dilatation. He returns for follow up. I discussed test results with the the patient and his daughter. Puneet Rolon reports doing well with no recurrent chest pain. Chief Complaint   Patient presents with    Coronary Artery Disease    Hyperlipidemia    Results     6 week visit and med review with Echo and Nuclear Stress test results       HPI:    Coronary Artery Disease  Presents for follow-up visit. Symptoms include leg swelling. Pertinent negatives include no chest pain, chest pressure, chest tightness, dizziness, muscle weakness, palpitations, shortness of breath or weight gain. Risk factors include hyperlipidemia. The symptoms have been stable. Hyperlipidemia  Pertinent negatives include no chest pain, focal weakness or shortness of breath. Past Medical History, Past Surgical History, Family history, Social History, and Medications were all reviewed with the patient today and updated as necessary.          Current Outpatient Medications:     furosemide (LASIX) 20 MG tablet, Take 1 tablet by mouth daily, Disp: 30 tablet, Rfl: 0    atorvastatin (LIPITOR) 80 MG tablet, Take 1 tablet by mouth daily, Disp: 90 tablet, Rfl: 3    albuterol sulfate HFA (PROVENTIL;VENTOLIN;PROAIR) 108 (90 Base) MCG/ACT inhaler, Inhale 2 puffs into the lungs every 4 hours as needed for Shortness of Breath, Disp: 18 g, Rfl: 11    Fluticasone furoate-vilanterol

## 2023-09-26 ENCOUNTER — OFFICE VISIT (OUTPATIENT)
Dept: ORTHOPEDIC SURGERY | Age: 83
End: 2023-09-26
Payer: MEDICARE

## 2023-09-26 DIAGNOSIS — M54.50 LUMBAR BACK PAIN: ICD-10-CM

## 2023-09-26 DIAGNOSIS — M47.816 SPONDYLOSIS OF LUMBAR REGION WITHOUT MYELOPATHY OR RADICULOPATHY: Primary | ICD-10-CM

## 2023-09-26 DIAGNOSIS — M54.16 LUMBAR RADICULOPATHY: ICD-10-CM

## 2023-09-26 PROCEDURE — G8417 CALC BMI ABV UP PARAM F/U: HCPCS | Performed by: PHYSICAL MEDICINE & REHABILITATION

## 2023-09-26 PROCEDURE — 1036F TOBACCO NON-USER: CPT | Performed by: PHYSICAL MEDICINE & REHABILITATION

## 2023-09-26 PROCEDURE — 99213 OFFICE O/P EST LOW 20 MIN: CPT | Performed by: PHYSICAL MEDICINE & REHABILITATION

## 2023-09-26 PROCEDURE — G8428 CUR MEDS NOT DOCUMENT: HCPCS | Performed by: PHYSICAL MEDICINE & REHABILITATION

## 2023-09-26 PROCEDURE — 1123F ACP DISCUSS/DSCN MKR DOCD: CPT | Performed by: PHYSICAL MEDICINE & REHABILITATION

## 2023-09-26 NOTE — PROGRESS NOTES
Date:  09/26/23     HPI:  I am seeing oTrrie Turpin in evalution/folowup. I last saw him in September. In the past he has had pain across the lower lumbar areas that may go down the right buttock and then outside of the right leg to the knee. Some slight weakness with dorsiflexion that is stable. Prior MR imaging reveals neuroforaminal narrowing on the right that could explain the symptoms, has seen Dr. Cristy Johnson in the past for which she felt spine surgery could be a last resort. In the past bilateral L5-S1 facet injections with a translaminar lumbar FABIO at the L5-S1 levels offer significant benefit, can last over 3 months. At last visit we tried to make a change to allow him to go a little longer between injections. 4 months ago he underwent bilateral L5-S1 facet injections with right L4 and L5 selective nerve root blocks which has had a more robust response. He tells me he is currently doing well and does not think he needs reinjection. He feels this latter combination has helped his mobility and his been more favorable overall. ROS: As above    PE: Cooperative. Reasonably good strength lower extremities. Not a lot of asymmetry between the right and left dorsiflexion today. No lateralizing findings. No accentuated reflexes. No lumbar spine tenderness. He ambulates without assistance. Assessment/Plan:       PREDOMINANTLY MUSCULOSKELETAL LUMBOSACRAL  SPINE PAIN. 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 Neuroforaminal narrowing. Very nice response to last set of injections, repeat as needed. We do not need to reimage. I will otherwise see him back in 9 months for continuity of care.         Emilia Aguilar MD

## 2023-10-16 NOTE — PROGRESS NOTES
He is an 76-year-old male with history of COPD, MERCEDES/central sleep apnea sleep apnea, previously seen by Dr. Bibi Tapia. Records are reviewed. He has complex sleep apnea and is on ASV device. DIAGNOSTICS:    Chest CT 6/2020-negative for PE. Calcified pleural plaque along the diaphragmatic surface on the right side. Hiatal hernia is present. Spirometry 2/16/2021-mild restrictive defect. CXR 2/2021-stable calcified plaque overlying right hemidiaphragm. No acute cardiopulmonary process. CPFT's 5/20/2021-spirometry is normal, lung volumes are also normal but at upper limits of normal suggesting there may be some underlying obstruction. DLCO was within normal limits. DLCO/VA was reduced, this is of questionable significance.

## 2023-10-18 ENCOUNTER — OFFICE VISIT (OUTPATIENT)
Dept: PULMONOLOGY | Age: 83
End: 2023-10-18
Payer: MEDICARE

## 2023-10-18 VITALS
TEMPERATURE: 97.3 F | HEART RATE: 84 BPM | WEIGHT: 219 LBS | RESPIRATION RATE: 14 BRPM | DIASTOLIC BLOOD PRESSURE: 75 MMHG | BODY MASS INDEX: 34.37 KG/M2 | OXYGEN SATURATION: 98 % | HEIGHT: 67 IN | SYSTOLIC BLOOD PRESSURE: 126 MMHG

## 2023-10-18 DIAGNOSIS — J44.9 CHRONIC OBSTRUCTIVE PULMONARY DISEASE, UNSPECIFIED COPD TYPE (HCC): Primary | ICD-10-CM

## 2023-10-18 DIAGNOSIS — F17.211 CIGARETTE NICOTINE DEPENDENCE IN REMISSION: ICD-10-CM

## 2023-10-18 DIAGNOSIS — G47.31 COMPLEX SLEEP APNEA SYNDROME: ICD-10-CM

## 2023-10-18 DIAGNOSIS — Z71.85 VACCINE COUNSELING: ICD-10-CM

## 2023-10-18 DIAGNOSIS — J94.8 CALCIFICATION OF PLEURA: ICD-10-CM

## 2023-10-18 DIAGNOSIS — Z77.090 ASBESTOS EXPOSURE: ICD-10-CM

## 2023-10-18 DIAGNOSIS — J30.89 NON-SEASONAL ALLERGIC RHINITIS, UNSPECIFIED TRIGGER: ICD-10-CM

## 2023-10-18 LAB
EXPIRATORY TIME: NORMAL
FEF 25-75% %PRED-PRE: NORMAL
FEF 25-75% PRED: NORMAL
FEF 25-75%-PRE: NORMAL
FEV1 %PRED-PRE: 89 %
FEV1 PRED: NORMAL
FEV1/FVC %PRED-PRE: NORMAL
FEV1/FVC PRED: 83 %
FEV1/FVC: NORMAL
FEV1: 2.16 L
FVC %PRED-PRE: 76 %
FVC PRED: NORMAL
FVC: 2.61 L
PEF %PRED-PRE: NORMAL
PEF PRED: NORMAL
PEF-PRE: NORMAL

## 2023-10-18 PROCEDURE — 1123F ACP DISCUSS/DSCN MKR DOCD: CPT | Performed by: NURSE PRACTITIONER

## 2023-10-18 PROCEDURE — G8484 FLU IMMUNIZE NO ADMIN: HCPCS | Performed by: NURSE PRACTITIONER

## 2023-10-18 PROCEDURE — 3023F SPIROM DOC REV: CPT | Performed by: NURSE PRACTITIONER

## 2023-10-18 PROCEDURE — G8427 DOCREV CUR MEDS BY ELIG CLIN: HCPCS | Performed by: NURSE PRACTITIONER

## 2023-10-18 PROCEDURE — G8417 CALC BMI ABV UP PARAM F/U: HCPCS | Performed by: NURSE PRACTITIONER

## 2023-10-18 PROCEDURE — 99214 OFFICE O/P EST MOD 30 MIN: CPT | Performed by: NURSE PRACTITIONER

## 2023-10-18 PROCEDURE — 94010 BREATHING CAPACITY TEST: CPT | Performed by: INTERNAL MEDICINE

## 2023-10-18 PROCEDURE — 1036F TOBACCO NON-USER: CPT | Performed by: NURSE PRACTITIONER

## 2023-10-18 RX ORDER — ALBUTEROL SULFATE 90 UG/1
AEROSOL, METERED RESPIRATORY (INHALATION)
Qty: 18 G | Refills: 11 | Status: SHIPPED | OUTPATIENT
Start: 2023-10-18

## 2023-10-18 RX ORDER — FLUTICASONE FUROATE AND VILANTEROL 200; 25 UG/1; UG/1
POWDER RESPIRATORY (INHALATION)
Qty: 1 EACH | Refills: 11 | Status: SHIPPED | OUTPATIENT
Start: 2023-10-18

## 2023-10-18 ASSESSMENT — PULMONARY FUNCTION TESTS
FVC_PERCENT_PREDICTED_PRE: 76
FEV1/FVC_PREDICTED: 83
FEV1_PERCENT_PREDICTED_PRE: 89
FVC: 2.61
FEV1: 2.16

## 2023-10-18 ASSESSMENT — ENCOUNTER SYMPTOMS
HEMOPTYSIS: 0
WHEEZING: 0
SHORTNESS OF BREATH: 1
COUGH: 1

## 2023-10-18 NOTE — PATIENT INSTRUCTIONS
The company who will be taking care of your Bilevel ASV machine and supplies is: Address: [address] [place] [address] [address], [address]  Phone: [phone] Option #1  Fax: [phone] 1 inhalation every morning rinse mouth after use. Albuterol 2 puffs 4 times daily if needed for shortness of breath or wheezing. Continue Zyrtec daily. Nasal saline rinse once daily as needed, kit and instructions provided along with recipe. If he has ongoing nasal drainage/congestion, add OTC fluticasone nasal spray, 1 to 2 sprays each nostril daily. He is up to date on pneumonia and flu vaccine. Recommend newest COVID booster and RSV vaccine.     Follow-up in 1 year with Dr. Wero Toledo or me with chest x-ray (asbestos exposure, pleural plaque) and spirometry

## 2023-10-18 NOTE — PROGRESS NOTES
[address] [facility], [address] [place] [address]  [phone]    Patient Name:  Manny Colbert    YOB: 1940    Office Visit 10/18/2023      CHIEF COMPLAINT:      Chief Complaint   Patient presents with    COPD    Sleep Apnea         ASSESSMENT:   (Medical Decision Making)                                                                                                                                          Encounter Diagnoses   Name Primary? Chronic obstructive pulmonary disease, unspecified COPD type ([address]) Yes    Comment: CPFT's 5/20/2021-spirometry is normal, lung volumes are also normal but at upper limits of normal suggesting there may be some underlying obstruction. Complex sleep apnea syndrome     Cigarette nicotine dependence in remission     Asbestos exposure     Calcification of pleura     Non-seasonal allergic rhinitis, unspecified trigger     Vaccine counseling      He reports some improvement in shortness of breath, intermittent wheezing which responds to albuterol. He has nonproductive cough with only occasional nonpurulent sputum. There are ongoing issues with him receiving his updated device for management of complex sleep apnea. [employer] company was contacted today during his visit to facilitate orders that were written from the sleep center. He remains tobacco free. He does not meet criteria for screening CT. History of asbestos exposure when he volunteered as a . There are calcified pleural plaques noted radiographically. Last CXR 11/2022 demonstrated no acute infiltrates. Pleural calcification again noted at the right lung base. PLAN:     Continue Breo 1 inhalation every morning rinse mouth after use. Albuterol 2 puffs 4 times daily if needed for shortness of breath or wheezing. Nasal saline rinse once daily as needed, kit and instructions provided along with recipe.   If he has

## 2024-01-24 NOTE — PROGRESS NOTES
orthopnea, murmurs, or edema.   GI:   No dysphagia, heartburn reflux, nausea/vomiting, diarrhea, abdominal pain, or bleeding.   NEURO:   There is no history of AMS, persistent headache, decreased level of consciousness, seizures, or motor or sensory deficits.      PHYSICAL EXAM:    Vitals:    01/25/24 1414   BP: (!) 102/58   Site: Right Upper Arm   Position: Sitting   Pulse: 68   Resp: 19   Temp: 97.3 °F (36.3 °C)   TempSrc: Infrared   SpO2: 94%  Comment: RA   Weight: 99.3 kg (219 lb)   Height: 1.727 m (5' 8\")        Body mass index is 33.3 kg/m².         GENERAL APPEARANCE:   The patient is obese and in no respiratory distress.   HEENT:   PERRL.  Conjunctivae unremarkable.   Nasal mucosa is without epistaxis, exudate, or polyps.  Gums and dentition are unremarkable.  There is no oropharyngeal narrowing.     NECK/LYMPHATIC:   Symmetrical with no elevation of jugular venous pulsation.  Trachea midline. No thyroid enlargement.  No cervical adenopathy.   LUNGS:   Normal respiratory effort with symmetrical lung expansion.   Breath sounds very few rhonchi which cleared with cough, no wheezes .   HEART:   There is a regular rate and rhythm.  No murmur, rub, or gallop.  There is no edema in the lower extremities.   ABDOMEN:   Soft and non-tender.  No hepatosplenomegaly.  Bowel sounds are normal.     NEURO:   The patient is alert and oriented to person, place, and time.  Memory appears intact and mood is normal.  No gross sensorimotor deficits are present.          ASSESSMENT:  (Medical Decision Making)      Diagnosis Orders   1. Complex sleep apnea syndrome  Ambulatory Referral to Sleep Studies   AHI from Nov 2023 with AHI 43/hr. He received a new machine and will go to [employer] on Tuesday to have download read. If AHI remains high on new machine, proceed with ASV titration. His previous study only had a 60% sleep efficiency. He is now sleeping 7 hours nightly.      2. Primary central sleep apnea  Ambulatory Referral to

## 2024-01-25 ENCOUNTER — OFFICE VISIT (OUTPATIENT)
Dept: SLEEP MEDICINE | Age: 84
End: 2024-01-25
Payer: MEDICARE

## 2024-01-25 VITALS
HEART RATE: 68 BPM | SYSTOLIC BLOOD PRESSURE: 102 MMHG | DIASTOLIC BLOOD PRESSURE: 58 MMHG | RESPIRATION RATE: 19 BRPM | HEIGHT: 68 IN | WEIGHT: 219 LBS | TEMPERATURE: 97.3 F | BODY MASS INDEX: 33.19 KG/M2 | OXYGEN SATURATION: 94 %

## 2024-01-25 DIAGNOSIS — G47.31 COMPLEX SLEEP APNEA SYNDROME: Primary | ICD-10-CM

## 2024-01-25 DIAGNOSIS — G47.31 PRIMARY CENTRAL SLEEP APNEA: ICD-10-CM

## 2024-01-25 PROCEDURE — 1036F TOBACCO NON-USER: CPT | Performed by: NURSE PRACTITIONER

## 2024-01-25 PROCEDURE — G8427 DOCREV CUR MEDS BY ELIG CLIN: HCPCS | Performed by: NURSE PRACTITIONER

## 2024-01-25 PROCEDURE — G8417 CALC BMI ABV UP PARAM F/U: HCPCS | Performed by: NURSE PRACTITIONER

## 2024-01-25 PROCEDURE — G8484 FLU IMMUNIZE NO ADMIN: HCPCS | Performed by: NURSE PRACTITIONER

## 2024-01-25 PROCEDURE — 1123F ACP DISCUSS/DSCN MKR DOCD: CPT | Performed by: NURSE PRACTITIONER

## 2024-01-25 PROCEDURE — 99213 OFFICE O/P EST LOW 20 MIN: CPT | Performed by: NURSE PRACTITIONER

## 2024-01-25 ASSESSMENT — SLEEP AND FATIGUE QUESTIONNAIRES
ESS TOTAL SCORE: 12
HOW LIKELY ARE YOU TO NOD OFF OR FALL ASLEEP WHEN YOU ARE A PASSENGER IN A CAR FOR AN HOUR WITHOUT A BREAK: 0
HOW LIKELY ARE YOU TO NOD OFF OR FALL ASLEEP WHILE SITTING AND TALKING TO SOMEONE: 0
HOW LIKELY ARE YOU TO NOD OFF OR FALL ASLEEP WHILE WATCHING TV: 3
HOW LIKELY ARE YOU TO NOD OFF OR FALL ASLEEP WHILE LYING DOWN TO REST IN THE AFTERNOON WHEN CIRCUMSTANCES PERMIT: 3
HOW LIKELY ARE YOU TO NOD OFF OR FALL ASLEEP WHILE SITTING QUIETLY AFTER LUNCH WITHOUT ALCOHOL: 3
HOW LIKELY ARE YOU TO NOD OFF OR FALL ASLEEP IN A CAR, WHILE STOPPED FOR A FEW MINUTES IN TRAFFIC: 0
HOW LIKELY ARE YOU TO NOD OFF OR FALL ASLEEP WHILE SITTING AND READING: 3
HOW LIKELY ARE YOU TO NOD OFF OR FALL ASLEEP WHILE SITTING INACTIVE IN A PUBLIC PLACE: 0

## 2024-01-25 NOTE — PATIENT INSTRUCTIONS
You will get a call from [facility] to schedule your in-lab PSG.   Once the test is completed, a physician will read the study.    You will be contacted from our office to discuss results.

## 2024-01-29 RX ORDER — ATORVASTATIN CALCIUM 80 MG/1
80 TABLET, FILM COATED ORAL DAILY
Qty: 90 TABLET | Refills: 3 | Status: SHIPPED | OUTPATIENT
Start: 2024-01-29

## 2024-03-11 ENCOUNTER — TELEPHONE (OUTPATIENT)
Dept: SLEEP MEDICINE | Age: 84
End: 2024-03-11

## 2024-03-11 NOTE — TELEPHONE ENCOUNTER
Spoke to patient's wife informing her of the recommended PAP changes. Instructed to take machine to DME location for setting changes.

## 2024-03-12 DIAGNOSIS — G47.31 PRIMARY CENTRAL SLEEP APNEA: Primary | ICD-10-CM

## 2024-03-20 ENCOUNTER — OFFICE VISIT (OUTPATIENT)
Age: 84
End: 2024-03-20
Payer: MEDICARE

## 2024-03-20 VITALS
DIASTOLIC BLOOD PRESSURE: 80 MMHG | SYSTOLIC BLOOD PRESSURE: 120 MMHG | HEIGHT: 68 IN | HEART RATE: 72 BPM | BODY MASS INDEX: 32.58 KG/M2 | WEIGHT: 215 LBS

## 2024-03-20 DIAGNOSIS — J44.9 CHRONIC OBSTRUCTIVE PULMONARY DISEASE, UNSPECIFIED COPD TYPE (HCC): ICD-10-CM

## 2024-03-20 DIAGNOSIS — I25.10 ATHEROSCLEROSIS OF NATIVE CORONARY ARTERY OF NATIVE HEART WITHOUT ANGINA PECTORIS: Primary | ICD-10-CM

## 2024-03-20 PROCEDURE — G8484 FLU IMMUNIZE NO ADMIN: HCPCS | Performed by: INTERNAL MEDICINE

## 2024-03-20 PROCEDURE — 1036F TOBACCO NON-USER: CPT | Performed by: INTERNAL MEDICINE

## 2024-03-20 PROCEDURE — 1123F ACP DISCUSS/DSCN MKR DOCD: CPT | Performed by: INTERNAL MEDICINE

## 2024-03-20 PROCEDURE — G8417 CALC BMI ABV UP PARAM F/U: HCPCS | Performed by: INTERNAL MEDICINE

## 2024-03-20 PROCEDURE — 3023F SPIROM DOC REV: CPT | Performed by: INTERNAL MEDICINE

## 2024-03-20 PROCEDURE — G8427 DOCREV CUR MEDS BY ELIG CLIN: HCPCS | Performed by: INTERNAL MEDICINE

## 2024-03-20 PROCEDURE — 99213 OFFICE O/P EST LOW 20 MIN: CPT | Performed by: INTERNAL MEDICINE

## 2024-03-20 RX ORDER — TRIAMCINOLONE ACETONIDE 1 MG/G
CREAM TOPICAL 3 TIMES DAILY
COMMUNITY
Start: 2022-12-01

## 2024-03-20 RX ORDER — FERROUS FUMARATE AND POLYSACCHRIDE IRON VITAMIN MINERAL COMPLEX SUPPLEMENT 191.2; 135.9; 1; 210; 20; 5; 5; 7; 25; 3 MG/1; MG/1; MG/1; MG/1; MG/1; MG/1; MG/1; MG/1; MG/1; UG/1
1 CAPSULE ORAL DAILY
COMMUNITY
Start: 2022-05-11

## 2024-03-20 ASSESSMENT — ENCOUNTER SYMPTOMS
CHEST TIGHTNESS: 0
ORTHOPNEA: 0
DIARRHEA: 0
HEMATEMESIS: 0
HEMATOCHEZIA: 0
ABDOMINAL PAIN: 0
BLURRED VISION: 0
HOARSE VOICE: 0
COLOR CHANGE: 0
SHORTNESS OF BREATH: 1
SPUTUM PRODUCTION: 0
BOWEL INCONTINENCE: 0
WHEEZING: 0

## 2024-03-20 NOTE — PROGRESS NOTES
urinary tract symptoms (LUTS)     Localized edema 2023    Lumbar spondylolysis     injections every 3-4 months    Nonrheumatic aortic valve stenosis 2023    Pure hypercholesterolemia     Seizures (HCC) 2018    no seizures recently, last seizure 2019    Shortness of breath 2015    Sleep apnea     CPAP nightly    Unresponsive episode      Past Surgical History:   Procedure Laterality Date    CHOLECYSTECTOMY      CORONARY ARTERY BYPASS GRAFT      ORTHOPEDIC SURGERY      knee surgury    TOTAL HIP ARTHROPLASTY Right     TOTAL KNEE ARTHROPLASTY Left     total     Family History   Problem Relation Age of Onset    Cancer Mother     Elevated Lipids Father     Heart Disease Father     Cancer Father       Social History     Tobacco Use    Smoking status: Former     Current packs/day: 0.00     Types: Cigarettes     Quit date: 1972     Years since quittin.2    Smokeless tobacco: Never   Substance Use Topics    Alcohol use: No       ROS:    Review of Systems   Constitutional: Negative for chills, decreased appetite, diaphoresis, fever, malaise/fatigue and weight gain.   HENT:  Negative for congestion, hearing loss, hoarse voice and nosebleeds.    Eyes:  Negative for blurred vision.   Cardiovascular:  Positive for dyspnea on exertion. Negative for chest pain, claudication, cyanosis, irregular heartbeat, leg swelling, near-syncope, orthopnea, palpitations, paroxysmal nocturnal dyspnea and syncope.   Respiratory:  Positive for shortness of breath. Negative for chest tightness, sputum production and wheezing.    Endocrine: Negative for polydipsia, polyphagia and polyuria.   Skin:  Negative for color change.   Musculoskeletal:  Negative for muscle weakness.   Gastrointestinal:  Negative for abdominal pain, bowel incontinence, diarrhea, hematemesis and hematochezia.   Genitourinary:  Negative for dysuria, frequency and hematuria.   Neurological:  Negative for dizziness, focal weakness, light-headedness,

## 2024-04-24 NOTE — PROGRESS NOTES
by mouth daily     aspirin 81 MG chewable tablet Take 1 tablet by mouth daily    Cetirizine HCl (ZYRTEC ALLERGY) 10 MG CAPS Take by mouth daily    Coenzyme Q10 75 MG CAPS Take 400 mg by mouth daily    ibuprofen (ADVIL;MOTRIN) 200 MG tablet Take 2 tablets by mouth as needed    levETIRAcetam (KEPPRA) 500 MG tablet Take 1 tablet by mouth 1 po in the am and 2 po in the pm    pantoprazole (PROTONIX) 40 MG tablet Take 1 tablet by mouth daily     No current facility-administered medications for this visit.           REVIEW OF SYSTEMS:   CONSTITUTIONAL:   There is no history of fever, chills, night sweats, weight loss, weight gain, persistent fatigue, or lethargy/hypersomnolence.   CARDIAC:   No chest pain, pressure, discomfort, palpitations, orthopnea, murmurs, or edema.   GI:   No dysphagia, heartburn reflux, nausea/vomiting, diarrhea, abdominal pain, or bleeding.   NEURO:   There is no history of AMS, persistent headache, decreased level of consciousness, seizures, or motor or sensory deficits.      PHYSICAL EXAM:    Vitals:    04/25/24 1532   BP: 118/72   Pulse: 80   Resp: 16   Temp: 98.3 °F (36.8 °C)   SpO2: 95%        GENERAL APPEARANCE:   The patient is normal weight and in no respiratory distress.   HEENT:   PERRL.  Conjunctivae unremarkable.   Nasal mucosa is without epistaxis, exudate, or polyps.  Gums and dentition are unremarkable.  There is  oropharyngeal narrowing.     NECK/LYMPHATIC:   Symmetrical with no elevation of jugular venous pulsation.  Trachea midline. No thyroid enlargement.  No cervical adenopathy.   LUNGS:   Normal respiratory effort with symmetrical lung expansion.   Breath sounds are clear.   HEART:   There is a regular rate and rhythm.  No murmur, rub, or gallop.  There is no edema in the lower extremities.   ABDOMEN:   Soft and non-tender.  No hepatosplenomegaly.  Bowel sounds are normal.     NEURO:   The patient is alert and oriented to person, place, and time.  Memory appears intact and mood

## 2024-04-25 ENCOUNTER — OFFICE VISIT (OUTPATIENT)
Dept: SLEEP MEDICINE | Age: 84
End: 2024-04-25
Payer: MEDICARE

## 2024-04-25 VITALS
HEIGHT: 68 IN | WEIGHT: 215.6 LBS | DIASTOLIC BLOOD PRESSURE: 72 MMHG | SYSTOLIC BLOOD PRESSURE: 118 MMHG | HEART RATE: 80 BPM | TEMPERATURE: 98.3 F | RESPIRATION RATE: 16 BRPM | OXYGEN SATURATION: 95 % | BODY MASS INDEX: 32.67 KG/M2

## 2024-04-25 DIAGNOSIS — G47.34 NOCTURNAL HYPOXEMIA: ICD-10-CM

## 2024-04-25 DIAGNOSIS — G47.31 COMPLEX SLEEP APNEA SYNDROME: Primary | ICD-10-CM

## 2024-04-25 PROCEDURE — 1123F ACP DISCUSS/DSCN MKR DOCD: CPT | Performed by: INTERNAL MEDICINE

## 2024-04-25 PROCEDURE — G8427 DOCREV CUR MEDS BY ELIG CLIN: HCPCS | Performed by: INTERNAL MEDICINE

## 2024-04-25 PROCEDURE — 1036F TOBACCO NON-USER: CPT | Performed by: INTERNAL MEDICINE

## 2024-04-25 PROCEDURE — 99214 OFFICE O/P EST MOD 30 MIN: CPT | Performed by: INTERNAL MEDICINE

## 2024-04-25 PROCEDURE — G8417 CALC BMI ABV UP PARAM F/U: HCPCS | Performed by: INTERNAL MEDICINE

## 2024-04-25 ASSESSMENT — SLEEP AND FATIGUE QUESTIONNAIRES
HOW LIKELY ARE YOU TO NOD OFF OR FALL ASLEEP WHILE SITTING AND READING: MODERATE CHANCE OF DOZING
HOW LIKELY ARE YOU TO NOD OFF OR FALL ASLEEP WHILE WATCHING TV: SLIGHT CHANCE OF DOZING
HOW LIKELY ARE YOU TO NOD OFF OR FALL ASLEEP WHILE SITTING AND TALKING TO SOMEONE: WOULD NEVER DOZE
HOW LIKELY ARE YOU TO NOD OFF OR FALL ASLEEP IN A CAR, WHILE STOPPED FOR A FEW MINUTES IN TRAFFIC: WOULD NEVER DOZE
HOW LIKELY ARE YOU TO NOD OFF OR FALL ASLEEP WHILE LYING DOWN TO REST IN THE AFTERNOON WHEN CIRCUMSTANCES PERMIT: HIGH CHANCE OF DOZING
HOW LIKELY ARE YOU TO NOD OFF OR FALL ASLEEP WHILE SITTING QUIETLY AFTER LUNCH WITHOUT ALCOHOL: SLIGHT CHANCE OF DOZING
HOW LIKELY ARE YOU TO NOD OFF OR FALL ASLEEP WHILE SITTING INACTIVE IN A PUBLIC PLACE: WOULD NEVER DOZE
ESS TOTAL SCORE: 7
HOW LIKELY ARE YOU TO NOD OFF OR FALL ASLEEP WHEN YOU ARE A PASSENGER IN A CAR FOR AN HOUR WITHOUT A BREAK: WOULD NEVER DOZE

## 2024-07-02 ENCOUNTER — TELEPHONE (OUTPATIENT)
Dept: ORTHOPEDIC SURGERY | Age: 84
End: 2024-07-02

## 2024-07-02 NOTE — TELEPHONE ENCOUNTER
Please call patient, he needs an appt for a Medicare review appt, can he come in July and not wait aug

## 2024-07-10 ENCOUNTER — OFFICE VISIT (OUTPATIENT)
Dept: ORTHOPEDIC SURGERY | Age: 84
End: 2024-07-10
Payer: MEDICARE

## 2024-07-10 DIAGNOSIS — M47.816 SPONDYLOSIS OF LUMBAR REGION WITHOUT MYELOPATHY OR RADICULOPATHY: Primary | ICD-10-CM

## 2024-07-10 DIAGNOSIS — M54.16 LUMBAR RADICULOPATHY: ICD-10-CM

## 2024-07-10 PROCEDURE — 99213 OFFICE O/P EST LOW 20 MIN: CPT | Performed by: PHYSICAL MEDICINE & REHABILITATION

## 2024-07-10 NOTE — PROGRESS NOTES
Date:  07/10/24     HPI:  I am seeing Estiven Colbert in evalution/folowup.  I saw him most recently in September.  In the past he has had pain in the lower lumbar paraspinal areas.  This pain may go into the right buttock area then laterally to the right knee.  Some slight weakness with this.  Prior MR imaging of the lumbar spine from April of last year revealed significant neuroforaminal narrowing at the L3-L4 and L4-L5 levels.  In the past he had bilateral L5-S1 facet injections in addition to a translaminar lumbar FABIO.  That would last about 3 months, though we modified the injections to try a right L4 and L5 selective nerve root blocks in place of the translaminar lumbar FABIO.  Those injections have worked very well for him.  His last set of injections of this tight were in May of last year he does not feel any thing further needs to be done right now.  Main thing he notes is that after about 30 minutes of standing or walking has a numb sensation in his right lateral thigh.  If this goes on low further, he may drag the leg a little bit but it is more of a nuisance than anything else.  The left side is unaffected.    ROS: As above    PE: Alert and cooperative.  He has really good strength in lower extremities, could not really differentiate any weakness of dorsiflexion on the right today.  No lateralizing sensory findings, pain attention to the lateral lower extremities.  He has no significant lumbar spine tenderness.  No ataxia in the upper extremities.    Assessment/Plan:       PREDOMINANTLY MUSCULOSKELETAL LUMBOSACRAL  SPINE PAIN.  Probable facet joint arthropathy with right L4 and L5 radiculopathy.  Neuroforaminal narrowing.  Excellent response to most recent set of injections.  Follow along, would repeat those as required.  With regards to the numbness in the right leg, I would just follow that along.  It is not causing any major difficulties, and we do not need to pursue another MRI of the lumbar spine

## 2024-09-23 ENCOUNTER — OFFICE VISIT (OUTPATIENT)
Age: 84
End: 2024-09-23
Payer: MEDICARE

## 2024-09-23 VITALS
HEART RATE: 86 BPM | HEIGHT: 68 IN | OXYGEN SATURATION: 97 % | BODY MASS INDEX: 32.67 KG/M2 | SYSTOLIC BLOOD PRESSURE: 138 MMHG | WEIGHT: 215.6 LBS | DIASTOLIC BLOOD PRESSURE: 60 MMHG

## 2024-09-23 DIAGNOSIS — I35.0 NONRHEUMATIC AORTIC VALVE STENOSIS: ICD-10-CM

## 2024-09-23 DIAGNOSIS — I25.10 CORONARY ARTERY DISEASE INVOLVING NATIVE CORONARY ARTERY OF NATIVE HEART WITHOUT ANGINA PECTORIS: Primary | ICD-10-CM

## 2024-09-23 PROCEDURE — G8417 CALC BMI ABV UP PARAM F/U: HCPCS | Performed by: INTERNAL MEDICINE

## 2024-09-23 PROCEDURE — 1036F TOBACCO NON-USER: CPT | Performed by: INTERNAL MEDICINE

## 2024-09-23 PROCEDURE — 93000 ELECTROCARDIOGRAM COMPLETE: CPT | Performed by: INTERNAL MEDICINE

## 2024-09-23 PROCEDURE — G8427 DOCREV CUR MEDS BY ELIG CLIN: HCPCS | Performed by: INTERNAL MEDICINE

## 2024-09-23 PROCEDURE — 99214 OFFICE O/P EST MOD 30 MIN: CPT | Performed by: INTERNAL MEDICINE

## 2024-09-23 PROCEDURE — 1123F ACP DISCUSS/DSCN MKR DOCD: CPT | Performed by: INTERNAL MEDICINE

## 2024-09-23 ASSESSMENT — ENCOUNTER SYMPTOMS
HEMATEMESIS: 0
BOWEL INCONTINENCE: 0
ABDOMINAL PAIN: 0
COLOR CHANGE: 0
HEMATOCHEZIA: 0
BLURRED VISION: 0
SPUTUM PRODUCTION: 0
HOARSE VOICE: 0
WHEEZING: 0
CHEST TIGHTNESS: 0
SHORTNESS OF BREATH: 1
DIARRHEA: 0
ORTHOPNEA: 0

## 2024-10-18 ENCOUNTER — HOSPITAL ENCOUNTER (OUTPATIENT)
Dept: GENERAL RADIOLOGY | Age: 84
Discharge: HOME | End: 2024-10-21
Payer: MEDICARE

## 2024-10-18 ENCOUNTER — OFFICE VISIT (OUTPATIENT)
Dept: PULMONOLOGY | Age: 84
End: 2024-10-18

## 2024-10-18 VITALS
SYSTOLIC BLOOD PRESSURE: 147 MMHG | RESPIRATION RATE: 14 BRPM | WEIGHT: 216 LBS | BODY MASS INDEX: 32.74 KG/M2 | OXYGEN SATURATION: 97 % | HEART RATE: 70 BPM | DIASTOLIC BLOOD PRESSURE: 72 MMHG | TEMPERATURE: 98.1 F | HEIGHT: 68 IN

## 2024-10-18 DIAGNOSIS — Z77.090 ASBESTOS EXPOSURE: ICD-10-CM

## 2024-10-18 DIAGNOSIS — J94.8 CALCIFICATION OF PLEURA: ICD-10-CM

## 2024-10-18 DIAGNOSIS — G47.39 COMPLEX SLEEP APNEA SYNDROME: ICD-10-CM

## 2024-10-18 DIAGNOSIS — F17.211 CIGARETTE NICOTINE DEPENDENCE IN REMISSION: ICD-10-CM

## 2024-10-18 DIAGNOSIS — J44.9 CHRONIC OBSTRUCTIVE PULMONARY DISEASE, UNSPECIFIED COPD TYPE (HCC): Primary | ICD-10-CM

## 2024-10-18 PROBLEM — J30.89 NON-SEASONAL ALLERGIC RHINITIS: Status: ACTIVE | Noted: 2024-10-18

## 2024-10-18 LAB
EXPIRATORY TIME: NORMAL
FEF 25-75% %PRED-PRE: NORMAL
FEF 25-75% PRED: NORMAL
FEF 25-75-PRE: NORMAL
FEV1 %PRED-PRE: 81 %
FEV1 PRED: 2.6 L
FEV1/FVC %PRED-PRE: NORMAL
FEV1/FVC PRED: NORMAL
FEV1/FVC: 79 %
FEV1: 2.11 L
FVC %PRED-PRE: 76 %
FVC PRED: 3.52 L
FVC: 2.68 L
PEF %PRED-PRE: NORMAL
PEF PRED: NORMAL
PEF-PRE: NORMAL

## 2024-10-18 PROCEDURE — 71046 X-RAY EXAM CHEST 2 VIEWS: CPT

## 2024-10-18 RX ORDER — ALBUTEROL SULFATE 90 UG/1
INHALANT RESPIRATORY (INHALATION)
Qty: 1 EACH | Refills: 11 | Status: SHIPPED | OUTPATIENT
Start: 2024-10-18

## 2024-10-18 RX ORDER — FLUTICASONE FUROATE AND VILANTEROL 200; 25 UG/1; UG/1
POWDER RESPIRATORY (INHALATION)
Qty: 1 EACH | Refills: 11 | Status: SHIPPED | OUTPATIENT
Start: 2024-10-18

## 2024-10-18 ASSESSMENT — ENCOUNTER SYMPTOMS
SPUTUM PRODUCTION: 0
HEMOPTYSIS: 0
COUGH: 1
SHORTNESS OF BREATH: 1

## 2024-10-18 ASSESSMENT — PULMONARY FUNCTION TESTS
FVC_PERCENT_PREDICTED_PRE: 76
FVC: 2.68
FEV1_PERCENT_PREDICTED_PRE: 81
FEV1_PREDICTED: 2.60
FEV1: 2.11
FVC_PREDICTED: 3.52
FEV1/FVC: 79

## 2024-10-18 NOTE — PATIENT INSTRUCTIONS
Continue Breo 1 inhalation every morning rinse mouth after use.     Albuterol 2 puffs 4 times daily if needed for shortness of breath or wheezing, including prior to exertion.     Chest x-ray on his way out, I will call with results.    Recommend newest COVID booster, seasonal flu vaccine, RSV vaccine.    Recommend Prevnar 20 pneumonia vaccine.  However, if not reimbursed by insurance, he is had PPSV23 since age 66 and had Prevnar 13 9 years ago.    Follow-up in 1 year, sooner if needed.

## 2024-10-18 NOTE — PROGRESS NOTES
He is an 84-year-old male with history of COPD, MERCEDES/central sleep apnea sleep apnea, previously seen by Dr. Vogel.  Records are reviewed.  He has complex sleep apnea and is on ASV device. He is followed in the sleep center.    DIAGNOSTICS:     Chest CT 6/2020-negative for PE.  Calcified pleural plaque along the diaphragmatic surface on the right side.  Hiatal hernia is present.  Spirometry 2/16/2021-mild restrictive defect.  CXR 2/2021-stable calcified plaque overlying right hemidiaphragm.  No acute cardiopulmonary process.  CPFT's 5/20/2021-spirometry is normal, lung volumes are also normal but at upper limits of normal suggesting there may be some underlying obstruction.  DLCO was within normal limits.  DLCO/VA was reduced, this is of questionable significance.  CXR 6/11/2023-clear lungs, no acute findings.  CXR 11/2022 demonstrated no acute infiltrates.  Pleural calcification again noted at the right lung base.  Spirometry 10/18/2023-normal.  
Activity     Days of Exercise per Week: 0     Minutes of Exercise per Session: 0     Total Minutes of Exercise per Week: 0   Stress: No Stress Concern Present (7/30/2024)    Received from [facility]    Stress     Feeling of Stress : Not at all   Social Connections: Moderately Integrated (7/30/2024)    Received from [facility]    Social Connections     Frequency of Communication with Friends and Family: Three times a week     Frequency of Social Gatherings with Friends and Family: Once a week   Intimate Partner Violence: Not At Risk (7/30/2024)    Received from [facility]    Intimate Partner Violence     Fear of Current or Ex-Partner: No     Emotionally Abused: No     Physically Abused: No     Sexually Abused: No   Housing Stability: Not At Risk (7/30/2024)    Received from [facility]    Housing Stability     Was there a time when you did not have a steady place to sleep: No     Worried that the place you are staying is making you sick: No       Family History   Problem Relation Age of Onset    Cancer Mother     Elevated Lipids Father     Heart Disease Father     Cancer Father        Allergies   Allergen Reactions    Promethazine Anaphylaxis    Codeine Nausea And Vomiting       Current Outpatient Medications   Medication Sig    B Complex Vitamins (B COMPLEX 1 PO) Take 1 tablet by mouth every morning    MoExv-UlSwok-ZY-B Cmp-C-Biot (INTEGRA PLUS) CAPS Take 1 tablet by mouth daily    triamcinolone (KENALOG) 0.1 % cream Apply topically 3 times daily    atorvastatin (LIPITOR) 80 MG tablet Take 1 tablet by mouth daily    Misc. Devices (CPAP MACHINE) MISC by Does not apply route    albuterol sulfate HFA (PROVENTIL;VENTOLIN;PROAIR) 108 (90 Base) MCG/ACT inhaler 2 puffs 4 times daily if needed for shortness of breath or wheezing    fluticasone furoate-vilanterol (BREO ELLIPTA) 200-25 MCG/ACT AEPB inhaler 1 inhalation every morning, rinse mouth after use    alendronate (FOSAMAX) 70 MG tablet Take 1 tablet by mouth

## 2024-10-29 NOTE — PROGRESS NOTES
lethargy/hypersomnolence.   CARDIAC:   No chest pain, pressure, discomfort, palpitations, orthopnea, murmurs, or edema.   GI:   No dysphagia, heartburn reflux, nausea/vomiting, diarrhea, abdominal pain, or bleeding.   NEURO:   There is no history of AMS, persistent headache, decreased level of consciousness, seizures, or motor or sensory deficits.      PHYSICAL EXAM:    Vitals:    10/30/24 1019   BP: 126/68   Pulse: 84   Resp: 14          GENERAL APPEARANCE:   The patient is normal weight and in no respiratory distress.   HEENT:   PERRL.  Conjunctivae unremarkable.   Nasal mucosa is without epistaxis, exudate, or polyps.  Gums and dentition are unremarkable.  There is  oropharyngeal narrowing.     NECK/LYMPHATIC:   Symmetrical with no elevation of jugular venous pulsation.  Trachea midline. No thyroid enlargement.  No cervical adenopathy.   LUNGS:   Normal respiratory effort with symmetrical lung expansion.   Breath sounds are clear.   HEART:   There is a regular rate and rhythm.  No murmur, rub, or gallop.  There is no edema in the lower extremities.   ABDOMEN:   Soft and non-tender.  No hepatosplenomegaly.  Bowel sounds are normal.     NEURO:   The patient is alert and oriented to person, place, and time.  Memory appears intact and mood is normal.  No gross sensorimotor deficits are present.          ASSESSMENT:  (Medical Decision Making)      Diagnosis Orders   1. Complex sleep apnea syndrome , on ASV with a EPAP of 9 cm and pressure support 5-15 cm.  We will maintain CPAP at the same setting and renew his supplies order DME - DURABLE MEDICAL EQUIPMENT      2. Nocturnal hypoxemia , improved with the ASV DME - DURABLE MEDICAL EQUIPMENT           PLAN:    Continue ASV at EPAP 9 cm and pressure support 5-15 cm    Continue proper sleep hygiene and positional therapy    Instructed the patient how to put his F40 mask properly to improve his air leak    Continue his follow-up with pulmonary and primary care    Renew his

## 2024-10-30 ENCOUNTER — OFFICE VISIT (OUTPATIENT)
Dept: SLEEP MEDICINE | Age: 84
End: 2024-10-30
Payer: MEDICARE

## 2024-10-30 VITALS
SYSTOLIC BLOOD PRESSURE: 126 MMHG | WEIGHT: 214 LBS | DIASTOLIC BLOOD PRESSURE: 68 MMHG | BODY MASS INDEX: 32.43 KG/M2 | HEIGHT: 68 IN | HEART RATE: 84 BPM | RESPIRATION RATE: 14 BRPM

## 2024-10-30 DIAGNOSIS — G47.34 NOCTURNAL HYPOXEMIA: ICD-10-CM

## 2024-10-30 DIAGNOSIS — G47.39 COMPLEX SLEEP APNEA SYNDROME: Primary | ICD-10-CM

## 2024-10-30 PROCEDURE — G8427 DOCREV CUR MEDS BY ELIG CLIN: HCPCS | Performed by: INTERNAL MEDICINE

## 2024-10-30 PROCEDURE — 1036F TOBACCO NON-USER: CPT | Performed by: INTERNAL MEDICINE

## 2024-10-30 PROCEDURE — 1159F MED LIST DOCD IN RCRD: CPT | Performed by: INTERNAL MEDICINE

## 2024-10-30 PROCEDURE — 1160F RVW MEDS BY RX/DR IN RCRD: CPT | Performed by: INTERNAL MEDICINE

## 2024-10-30 PROCEDURE — 1123F ACP DISCUSS/DSCN MKR DOCD: CPT | Performed by: INTERNAL MEDICINE

## 2024-10-30 PROCEDURE — 99214 OFFICE O/P EST MOD 30 MIN: CPT | Performed by: INTERNAL MEDICINE

## 2024-10-30 PROCEDURE — G8417 CALC BMI ABV UP PARAM F/U: HCPCS | Performed by: INTERNAL MEDICINE

## 2024-10-30 PROCEDURE — G8484 FLU IMMUNIZE NO ADMIN: HCPCS | Performed by: INTERNAL MEDICINE

## 2024-10-30 PROCEDURE — G2211 COMPLEX E/M VISIT ADD ON: HCPCS | Performed by: INTERNAL MEDICINE

## 2024-10-30 ASSESSMENT — SLEEP AND FATIGUE QUESTIONNAIRES
HOW LIKELY ARE YOU TO NOD OFF OR FALL ASLEEP WHILE SITTING AND TALKING TO SOMEONE: WOULD NEVER DOZE
HOW LIKELY ARE YOU TO NOD OFF OR FALL ASLEEP WHILE SITTING AND READING: WOULD NEVER DOZE
HOW LIKELY ARE YOU TO NOD OFF OR FALL ASLEEP WHEN YOU ARE A PASSENGER IN A CAR FOR AN HOUR WITHOUT A BREAK: WOULD NEVER DOZE
HOW LIKELY ARE YOU TO NOD OFF OR FALL ASLEEP IN A CAR, WHILE STOPPED FOR A FEW MINUTES IN TRAFFIC: WOULD NEVER DOZE
HOW LIKELY ARE YOU TO NOD OFF OR FALL ASLEEP WHILE LYING DOWN TO REST IN THE AFTERNOON WHEN CIRCUMSTANCES PERMIT: HIGH CHANCE OF DOZING
HOW LIKELY ARE YOU TO NOD OFF OR FALL ASLEEP WHILE SITTING INACTIVE IN A PUBLIC PLACE: WOULD NEVER DOZE
HOW LIKELY ARE YOU TO NOD OFF OR FALL ASLEEP WHILE WATCHING TV: MODERATE CHANCE OF DOZING
ESS TOTAL SCORE: 8
HOW LIKELY ARE YOU TO NOD OFF OR FALL ASLEEP WHILE SITTING QUIETLY AFTER LUNCH WITHOUT ALCOHOL: HIGH CHANCE OF DOZING

## 2025-02-12 ENCOUNTER — TELEPHONE (OUTPATIENT)
Dept: ORTHOPEDIC SURGERY | Age: 85
End: 2025-02-12

## 2025-02-12 DIAGNOSIS — M54.16 LUMBAR RADICULOPATHY: ICD-10-CM

## 2025-02-12 DIAGNOSIS — M47.816 SPONDYLOSIS OF LUMBAR REGION WITHOUT MYELOPATHY OR RADICULOPATHY: Primary | ICD-10-CM

## 2025-02-12 NOTE — TELEPHONE ENCOUNTER
Appointment Request  (Newest Message First)  Estiven Colbert  [facility]  Staff16 hours ago (4:29 PM)       Appointment Request From: Estiven Colbert     With Provider: Dr. BOBBI Thao MD [[facility]]     Preferred Date Range: 2/17/2025 - 2/28/2025     Preferred Times: Any Time     Reason for visit: Request an Appointment     Comments:  Cortisone shot

## 2025-02-26 ENCOUNTER — OFFICE VISIT (OUTPATIENT)
Dept: ORTHOPEDIC SURGERY | Age: 85
End: 2025-02-26
Payer: MEDICARE

## 2025-02-26 DIAGNOSIS — M54.16 LUMBAR RADICULOPATHY: ICD-10-CM

## 2025-02-26 DIAGNOSIS — M47.816 SPONDYLOSIS OF LUMBAR REGION WITHOUT MYELOPATHY OR RADICULOPATHY: Primary | ICD-10-CM

## 2025-02-26 PROCEDURE — 64493 INJ PARAVERT F JNT L/S 1 LEV: CPT | Performed by: PHYSICAL MEDICINE & REHABILITATION

## 2025-02-26 PROCEDURE — 64484 NJX AA&/STRD TFRM EPI L/S EA: CPT | Performed by: PHYSICAL MEDICINE & REHABILITATION

## 2025-02-26 PROCEDURE — 64483 NJX AA&/STRD TFRM EPI L/S 1: CPT | Performed by: PHYSICAL MEDICINE & REHABILITATION

## 2025-02-26 RX ORDER — TRIAMCINOLONE ACETONIDE 40 MG/ML
40 INJECTION, SUSPENSION INTRA-ARTICULAR; INTRAMUSCULAR ONCE
Status: COMPLETED | OUTPATIENT
Start: 2025-02-26 | End: 2025-02-26

## 2025-02-26 RX ADMIN — TRIAMCINOLONE ACETONIDE 40 MG: 40 INJECTION, SUSPENSION INTRA-ARTICULAR; INTRAMUSCULAR at 10:42

## 2025-02-26 NOTE — PROGRESS NOTES
Date: 02/26/25   Name: Estiven Colbert    Pre-Procedural Diagnosis:    Diagnosis Orders   1. Spondylosis of lumbar region without myelopathy or radiculopathy  FL NERVE BLOCK LUMBOSACRAL 1ST    FL NERVE BLOCK LUMBOSACRAL EACH ADD    triamcinolone acetonide (KENALOG-40) injection 40 mg    FL INJ LUMB/SAC FACET SINGLE LEVEL      2. Lumbar radiculopathy  FL NERVE BLOCK LUMBOSACRAL 1ST    FL NERVE BLOCK LUMBOSACRAL EACH ADD    triamcinolone acetonide (KENALOG-40) injection 40 mg    FL INJ LUMB/SAC FACET SINGLE LEVEL          Procedure: Selective Nerve Root Blocks (Transforaminal) - Multiple Level with lumbar facet joint injection(s)    I have reviewed prior lumbar spine radiographs that reveal 5 non rib-bearing lumbar vertebrae., I have verbally confirmed side of symptomatology with patient., and I have personally reviewed with patient the informed consent for operation/procedure per [facility] protocol.  This involves risks and benefits of procedure, potential for success/improvement of injections,qualifications of physician performing procedure.  Consent form addressed appropriate local anesthesia.  This form was signed by all appropriate parties and scanned into the medical record. Note that is not appropriate for me to discuss spine surgical issues or other treatment options if I am not the primary clinician.  If I am the ordering clinician, those issues would have been discussed at the appropriate office visit or at upcoming encounter.     Precautions: [facility] Precautions spine injections: None.  Patient denies any prior sensitivity to steroid, local anesthetic, contrast dye, iodine or shellfish.     The procedure was discussed at length with the patient and informed consent was signed. The patient was placed in a prone position on the fluoroscopy table and the skin was prepped and draped in a routine sterile fashion. The areas to be injected were each anesthetized with approximately 5 cc of 1% Lidocaine. A

## 2025-03-13 ENCOUNTER — OFFICE VISIT (OUTPATIENT)
Age: 85
End: 2025-03-13
Payer: MEDICARE

## 2025-03-13 VITALS
WEIGHT: 218 LBS | BODY MASS INDEX: 33.04 KG/M2 | HEART RATE: 72 BPM | HEIGHT: 68 IN | SYSTOLIC BLOOD PRESSURE: 138 MMHG | DIASTOLIC BLOOD PRESSURE: 72 MMHG

## 2025-03-13 DIAGNOSIS — I35.0 NONRHEUMATIC AORTIC VALVE STENOSIS: ICD-10-CM

## 2025-03-13 DIAGNOSIS — I25.10 ATHEROSCLEROSIS OF NATIVE CORONARY ARTERY OF NATIVE HEART WITHOUT ANGINA PECTORIS: Primary | ICD-10-CM

## 2025-03-13 PROCEDURE — G8428 CUR MEDS NOT DOCUMENT: HCPCS | Performed by: INTERNAL MEDICINE

## 2025-03-13 PROCEDURE — 1123F ACP DISCUSS/DSCN MKR DOCD: CPT | Performed by: INTERNAL MEDICINE

## 2025-03-13 PROCEDURE — 1036F TOBACCO NON-USER: CPT | Performed by: INTERNAL MEDICINE

## 2025-03-13 PROCEDURE — G8417 CALC BMI ABV UP PARAM F/U: HCPCS | Performed by: INTERNAL MEDICINE

## 2025-03-13 PROCEDURE — 99214 OFFICE O/P EST MOD 30 MIN: CPT | Performed by: INTERNAL MEDICINE

## 2025-03-13 PROCEDURE — 1126F AMNT PAIN NOTED NONE PRSNT: CPT | Performed by: INTERNAL MEDICINE

## 2025-03-13 ASSESSMENT — ENCOUNTER SYMPTOMS
HEMATOCHEZIA: 0
DIARRHEA: 0
SPUTUM PRODUCTION: 0
HOARSE VOICE: 0
SHORTNESS OF BREATH: 0
COLOR CHANGE: 0
HEMATEMESIS: 0
WHEEZING: 0
CHEST TIGHTNESS: 0
BOWEL INCONTINENCE: 0
ABDOMINAL PAIN: 0

## 2025-03-13 NOTE — PROGRESS NOTES
2023    Lumbar spondylolysis     injections every 3-4 months    Nonrheumatic aortic valve stenosis 2023    Pure hypercholesterolemia     Seizures (HCC) 2018    no seizures recently, last seizure 2019    Shortness of breath 2015    Sleep apnea     CPAP nightly    Unresponsive episode      Past Surgical History:   Procedure Laterality Date    CHOLECYSTECTOMY      CORONARY ARTERY BYPASS GRAFT  2001    ORTHOPEDIC SURGERY      knee surgury    TOTAL HIP ARTHROPLASTY Right     TOTAL KNEE ARTHROPLASTY Left     total     Family History   Problem Relation Age of Onset    Cancer Mother     Elevated Lipids Father     Heart Disease Father     Cancer Father       Social History     Tobacco Use    Smoking status: Former     Current packs/day: 0.00     Types: Cigarettes     Quit date: 1972     Years since quittin.2    Smokeless tobacco: Never   Substance Use Topics    Alcohol use: No       ROS:    Review of Systems   Constitutional: Negative for chills, decreased appetite, diaphoresis, fever and weight gain.   HENT:  Negative for congestion, hearing loss, hoarse voice and nosebleeds.    Cardiovascular:  Negative for chest pain, claudication, cyanosis, dyspnea on exertion, irregular heartbeat, leg swelling, near-syncope, palpitations and syncope.   Respiratory:  Negative for chest tightness, shortness of breath, sputum production and wheezing.    Endocrine: Negative for polydipsia, polyphagia and polyuria.   Skin:  Negative for color change.   Musculoskeletal:  Positive for falls (with a resultant 5th right finger fracture on 3/12/25.). Negative for muscle weakness.   Gastrointestinal:  Negative for abdominal pain, bowel incontinence, diarrhea, hematemesis and hematochezia.   Genitourinary:  Negative for dysuria, frequency and hematuria.   Neurological:  Negative for dizziness, focal weakness, light-headedness, loss of balance, numbness, sensory change and weakness.   Psychiatric/Behavioral:  Negative for

## 2025-04-08 RX ORDER — ATORVASTATIN CALCIUM 80 MG/1
80 TABLET, FILM COATED ORAL DAILY
Qty: 90 TABLET | Refills: 3 | Status: SHIPPED | OUTPATIENT
Start: 2025-04-08

## 2025-05-01 NOTE — PROGRESS NOTES
disease (HCC)    Cigarette nicotine dependence in remission    Asbestos exposure    Calcification of pleura    Nocturnal hypoxemia    Non-seasonal allergic rhinitis          Past Surgical History:   Procedure Laterality Date    CHOLECYSTECTOMY      CORONARY ARTERY BYPASS GRAFT      ORTHOPEDIC SURGERY      knee surgury    TOTAL HIP ARTHROPLASTY Right     TOTAL KNEE ARTHROPLASTY Left     total       [unfilled]        Social History     Socioeconomic History    Marital status:      Spouse name: Not on file    Number of children: Not on file    Years of education: Not on file    Highest education level: Not on file   Occupational History    Not on file   Tobacco Use    Smoking status: Former     Current packs/day: 0.00     Types: Cigarettes     Quit date: 1972     Years since quittin.3    Smokeless tobacco: Never   Substance and Sexual Activity    Alcohol use: No    Drug use: Never    Sexual activity: Not on file   Other Topics Concern    Not on file   Social History Narrative    Asbestos exposure when working as a .     Social Drivers of Health     Financial Resource Strain: Low Risk  (3/12/2025)    Received from [facility]    Financial Resource Strain     Difficulty Paying Living Expenses: Not very hard     Difficulty Paying Medical Expenses: No   Food Insecurity: No Food Insecurity (2023)    [facility]    Food Insecurity     Worried about Running Out of Food in the Last Year: Never true     Ran Out of Food in the Last Year: Never true   Transportation Needs: No Transportation Needs (3/12/2025)    Received from [facility]    Transportation Needs     Lack of Transportation: No   Physical Activity: Inactive (2024)    Received from [facility]    Physical Activity     Days of Exercise per Week: 0 days     On average, how many minutes do you exercise per day?: 0     Total Minutes of Exercise Per Week: 0   Stress: No Stress

## 2025-05-02 ENCOUNTER — OFFICE VISIT (OUTPATIENT)
Dept: SLEEP MEDICINE | Age: 85
End: 2025-05-02
Payer: MEDICARE

## 2025-05-02 VITALS
SYSTOLIC BLOOD PRESSURE: 138 MMHG | BODY MASS INDEX: 32.58 KG/M2 | WEIGHT: 215 LBS | HEIGHT: 68 IN | OXYGEN SATURATION: 99 % | HEART RATE: 103 BPM | RESPIRATION RATE: 14 BRPM | DIASTOLIC BLOOD PRESSURE: 80 MMHG

## 2025-05-02 DIAGNOSIS — G47.39 COMPLEX SLEEP APNEA SYNDROME: Primary | ICD-10-CM

## 2025-05-02 DIAGNOSIS — G47.34 NOCTURNAL HYPOXEMIA: ICD-10-CM

## 2025-05-02 PROCEDURE — G8427 DOCREV CUR MEDS BY ELIG CLIN: HCPCS | Performed by: INTERNAL MEDICINE

## 2025-05-02 PROCEDURE — G8417 CALC BMI ABV UP PARAM F/U: HCPCS | Performed by: INTERNAL MEDICINE

## 2025-05-02 PROCEDURE — 1160F RVW MEDS BY RX/DR IN RCRD: CPT | Performed by: INTERNAL MEDICINE

## 2025-05-02 PROCEDURE — G2211 COMPLEX E/M VISIT ADD ON: HCPCS | Performed by: INTERNAL MEDICINE

## 2025-05-02 PROCEDURE — 1159F MED LIST DOCD IN RCRD: CPT | Performed by: INTERNAL MEDICINE

## 2025-05-02 PROCEDURE — 99214 OFFICE O/P EST MOD 30 MIN: CPT | Performed by: INTERNAL MEDICINE

## 2025-05-02 PROCEDURE — 1036F TOBACCO NON-USER: CPT | Performed by: INTERNAL MEDICINE

## 2025-05-02 PROCEDURE — 1123F ACP DISCUSS/DSCN MKR DOCD: CPT | Performed by: INTERNAL MEDICINE

## 2025-05-02 ASSESSMENT — SLEEP AND FATIGUE QUESTIONNAIRES
HOW LIKELY ARE YOU TO NOD OFF OR FALL ASLEEP WHILE SITTING AND READING: MODERATE CHANCE OF DOZING
HOW LIKELY ARE YOU TO NOD OFF OR FALL ASLEEP WHEN YOU ARE A PASSENGER IN A CAR FOR AN HOUR WITHOUT A BREAK: WOULD NEVER DOZE
ESS TOTAL SCORE: 11
HOW LIKELY ARE YOU TO NOD OFF OR FALL ASLEEP WHILE LYING DOWN TO REST IN THE AFTERNOON WHEN CIRCUMSTANCES PERMIT: HIGH CHANCE OF DOZING
HOW LIKELY ARE YOU TO NOD OFF OR FALL ASLEEP WHILE SITTING AND TALKING TO SOMEONE: WOULD NEVER DOZE
HOW LIKELY ARE YOU TO NOD OFF OR FALL ASLEEP IN A CAR, WHILE STOPPED FOR A FEW MINUTES IN TRAFFIC: WOULD NEVER DOZE
HOW LIKELY ARE YOU TO NOD OFF OR FALL ASLEEP WHILE SITTING INACTIVE IN A PUBLIC PLACE: MODERATE CHANCE OF DOZING
HOW LIKELY ARE YOU TO NOD OFF OR FALL ASLEEP WHILE SITTING QUIETLY AFTER LUNCH WITHOUT ALCOHOL: MODERATE CHANCE OF DOZING
HOW LIKELY ARE YOU TO NOD OFF OR FALL ASLEEP WHILE WATCHING TV: MODERATE CHANCE OF DOZING

## 2025-05-06 ENCOUNTER — OFFICE VISIT (OUTPATIENT)
Dept: ORTHOPEDIC SURGERY | Age: 85
End: 2025-05-06
Payer: MEDICARE

## 2025-05-06 DIAGNOSIS — M47.816 SPONDYLOSIS OF LUMBAR REGION WITHOUT MYELOPATHY OR RADICULOPATHY: Primary | ICD-10-CM

## 2025-05-06 DIAGNOSIS — M54.50 LUMBAR BACK PAIN: ICD-10-CM

## 2025-05-06 DIAGNOSIS — M54.16 LUMBAR RADICULOPATHY: ICD-10-CM

## 2025-05-06 PROCEDURE — G8428 CUR MEDS NOT DOCUMENT: HCPCS | Performed by: PHYSICAL MEDICINE & REHABILITATION

## 2025-05-06 PROCEDURE — 1036F TOBACCO NON-USER: CPT | Performed by: PHYSICAL MEDICINE & REHABILITATION

## 2025-05-06 PROCEDURE — G8417 CALC BMI ABV UP PARAM F/U: HCPCS | Performed by: PHYSICAL MEDICINE & REHABILITATION

## 2025-05-06 PROCEDURE — G2211 COMPLEX E/M VISIT ADD ON: HCPCS | Performed by: PHYSICAL MEDICINE & REHABILITATION

## 2025-05-06 PROCEDURE — 99213 OFFICE O/P EST LOW 20 MIN: CPT | Performed by: PHYSICAL MEDICINE & REHABILITATION

## 2025-05-06 PROCEDURE — 1123F ACP DISCUSS/DSCN MKR DOCD: CPT | Performed by: PHYSICAL MEDICINE & REHABILITATION

## 2025-05-06 NOTE — PROGRESS NOTES
Date:  05/06/25     HPI:  I am seeing Estiven Colbert in evalution/folowup.  I saw him most recently just over 2 months ago.  That was for injections of the right L4 and L5 levels with bilateral L5-S1 facet injections.  His track record with these injections is good.  In the past MR imaging has revealed significant neuroforaminal narrowing at the L3-L4 and L4-L5 levels.  He has had facet injections also a translaminar lumbar FABIO, though the latter not seem to work as well.  The last of injections are doing pretty well, does not feel he is quite ready for repeat injections.  He does appear to have a little bit of weakness in the right lower extremity but does not want to consider surgical options right now.  Is something he can live with.  He is left lower extremity is doing pretty well.  He has had no accidents or injuries.  Of note, symptoms are similar with symptoms in the lower lumbar paraspinal areas gravitating down the right lower extremity.    No new problems since I last saw him.  He does use a cane for stability.    Of note in the past he saw a spine surgeon he also of the spine surgical options could be a last resort.    ROS: As above    PE: Alert and cooperative.  Has reasonably good strength lower extremities.  He presents with a cane.  No pathologically heightened reflexes.  He has good range of motion of the hips.  He has tenderness in the lower lumbar paraspinal areas for the most part.    Assessment/Plan:       PREDOMINANTLY MUSCULOSKELETAL LUMBOSACRAL  SPINE PAIN.  In the context of significant neuroforaminal narrowing, I believe this does affect his strength on the right but nothing I think is eminently of a surgical nature.  Needs to keep a close eye on it.  I do not think we need to reimage.  Will follow things along and I can repeat injections when he feels the need.  I will otherwise see him back in 10 months for continuity of care but can reevaluate prior.    Clinical Notes:   I/my clinic

## 2025-05-20 ENCOUNTER — HOSPITAL ENCOUNTER (OUTPATIENT)
Dept: PHYSICAL THERAPY | Age: 85
Setting detail: RECURRING SERIES
Discharge: HOME | End: 2025-05-23
Attending: PHYSICAL MEDICINE & REHABILITATION
Payer: MEDICARE

## 2025-05-20 DIAGNOSIS — M62.81 MUSCLE WEAKNESS (GENERALIZED): ICD-10-CM

## 2025-05-20 DIAGNOSIS — Z91.81 HISTORY OF FALLING: ICD-10-CM

## 2025-05-20 DIAGNOSIS — R26.2 DIFFICULTY IN WALKING, NOT ELSEWHERE CLASSIFIED: ICD-10-CM

## 2025-05-20 DIAGNOSIS — M54.59 OTHER LOW BACK PAIN: Primary | ICD-10-CM

## 2025-05-20 PROCEDURE — 97161 PT EVAL LOW COMPLEX 20 MIN: CPT

## 2025-05-20 PROCEDURE — 97110 THERAPEUTIC EXERCISES: CPT

## 2025-05-20 NOTE — THERAPY EVALUATION
furniture to pull up. Pt reports he feels that he needs to hold onto furniture to bend down to pick something up.     Pt has history of fall in March 2025 that resulted in visit to ED. Pt broke his finger.    Walking is pt primary mode of fitness - approx 1 mile with grade change. Pt averages around 1500 steps/day.    Pt has had two seizures -  last one was over 1 year ago.     Breakfast - toast, peanut butter, oatmeal  Lunch - roast beef sandwhich, chips, cheese crackers  Dinner - protein and two veggies    Water 68 - 72 oz/day    Per MD note:  \"Date:  05/06/25   HPI:  I am seeing Estiven Colbert in evalution/folowup.  I saw him most recently just over 2 months ago.  That was for injections of the right L4 and L5 levels with bilateral L5-S1 facet injections.  His track record with these injections is good.  In the past MR imaging has revealed significant neuroforaminal narrowing at the L3-L4 and L4-L5 levels.  He has had facet injections also a translaminar lumbar FABIO, though the latter not seem to work as well.  The last of injections are doing pretty well, does not feel he is quite ready for repeat injections.  He does appear to have a little bit of weakness in the right lower extremity but does not want to consider surgical options right now.  Is something he can live with.  He is left lower extremity is doing pretty well.  He has had no accidents or injuries.  Of note, symptoms are similar with symptoms in the lower lumbar paraspinal areas gravitating down the right lower extremity.     No new problems since I last saw him.  He does use a cane for stability.\"    Patient Stated Goal(s):  \"to be able to get out of a chair, have less pain, improve my balance\"  Initial Pain Level:       /10   Post Session Pain Level:      /10  Past Medical History/Comorbidities:   Mr. Colbert  has a past medical history of Adverse effect of anesthesia, Anemia, Arteriosclerosis of coronary artery bypass graft, Arthritis, Asthma,

## 2025-05-20 NOTE — PROGRESS NOTES
Estiven Colbert  : 1940  Primary: Medicare Part A And B (Medicare)  Secondary: PHYSICIANS [facility] @ [place] [address] [place] [place] [address]  Phone: [phone]  Fax: [phone] Plan Frequency: 2x/wk for 12 weeks    Plan of Care/Certification Expiration Date: 25        Plan of Care/Certification Expiration Date:  Plan of Care/Certification Expiration Date: 25    Frequency/Duration: Plan Frequency: 2x/wk for 12 weeks      Time In/Out:   Time In: 1100  Time Out: 1200      PT Visit Info:         Visit Count:  1    OUTPATIENT PHYSICAL THERAPY:   Treatment Note 2025       Episode  (LBP)               Treatment Diagnosis:    Other low back pain  Muscle weakness (generalized)  Difficulty in walking, not elsewhere classified  History of falling  Medical/Referring Diagnosis:    Spondylosis of lumbar region without myelopathy or radiculopathy  Lumbar radiculopathy  Lumbar back pain      Referring Physician:  BOBBI Thao Jr., MD MD Orders:  PT Eval and Treat   Return MD Appt:  7/15/25   Date of Onset:  Onset Date:  (chronic with recent flare up)     Allergies:   Promethazine and Codeine  Restrictions/Precautions:   Fall Precautions: history of falling      Interventions Planned (Treatment may consist of any combination of the following):     See Assessment Note    Subjective Comments:   Please see eval  Initial Pain Level:      /10  Post Session Pain Level:       /10  Medications Last Reviewed: 2025  Updated Objective Findings:  See Evaluation Note from today  Treatment     THERAPEUTIC EXERCISE: ( 15 minutes):    Exercises per grid below to improve mobility, strength, balance, and coordination. Required minimal visual, verbal, manual, and tactile cues to promote proper body mechanics.  Progressed resistance and repetitions as indicated.     Date:  2025 Date:   Date:     Activity/Exercise Parameters Parameters Parameters

## 2025-05-22 ENCOUNTER — HOSPITAL ENCOUNTER (OUTPATIENT)
Dept: PHYSICAL THERAPY | Age: 85
Setting detail: RECURRING SERIES
Discharge: HOME | End: 2025-05-25
Attending: PHYSICAL MEDICINE & REHABILITATION
Payer: MEDICARE

## 2025-05-22 PROCEDURE — 97110 THERAPEUTIC EXERCISES: CPT

## 2025-05-22 NOTE — PROGRESS NOTES
Date Time Provider Department Center   5/28/2025  4:00 PM Susannah Rodriguez, PT [facility]   6/2/2025  1:00 PM Susannah Rodriguez, PT [facility]   6/4/2025 11:15 AM Luz Jain, PT [facility]   6/9/2025  1:00 PM Susannah Rodriguez, PT [facility]   6/11/2025  2:30 PM Arleth De Anda, PT [facility]   6/16/2025  1:00 PM Susannah Rodriguez, PT [facility]   6/18/2025  3:15 PM Luz Jain, PT [facility]   6/23/2025  1:00 PM Susannah Rodriguez, PT [facility]   6/25/2025  3:15 PM Luz Jain, PT [facility]   6/30/2025  1:45 PM Susannah Rodriguez, PT [facility]   7/15/2025  1:50 PM BOBBI Thao Jr., MD [facility]   9/18/2025 10:30 AM Hoang Del Toro MD [facility]   9/24/2025 11:45 AM Gabriel Blanchard MD [facility]   10/20/2025  2:20 PM Nadira Vasquez, APRN - CNP [facility]   1/6/2026  3:20 PM BOBBI Thao Jr., MD [facility]

## 2025-05-28 ENCOUNTER — APPOINTMENT (OUTPATIENT)
Dept: PHYSICAL THERAPY | Age: 85
End: 2025-05-28
Attending: PHYSICAL MEDICINE & REHABILITATION
Payer: MEDICARE

## 2025-06-02 ENCOUNTER — HOSPITAL ENCOUNTER (OUTPATIENT)
Dept: PHYSICAL THERAPY | Age: 85
Setting detail: RECURRING SERIES
Discharge: HOME | End: 2025-06-05
Attending: PHYSICAL MEDICINE & REHABILITATION
Payer: MEDICARE

## 2025-06-02 PROCEDURE — 97110 THERAPEUTIC EXERCISES: CPT

## 2025-06-02 PROCEDURE — 97530 THERAPEUTIC ACTIVITIES: CPT

## 2025-06-02 NOTE — PROGRESS NOTES
Future Appointments   Date Time Provider Department Center   6/4/2025 11:15 AM Luz Jain, PT [facility]   6/9/2025  1:00 PM uSsannah Rodriguez, PT [facility]   6/11/2025  2:30 PM Arleth De Anda, PT [facility]   6/16/2025  1:00 PM Susannah Rodriguez, PT [facility]   6/18/2025  3:15 PM Luz Jain, PT [facility]   6/23/2025  1:00 PM Susannah Rodriguez, PT [facility]   6/25/2025  3:15 PM Luz Jain, PT [facility]   6/30/2025  1:45 PM Susannah Rodriguez, PT [facility]   7/15/2025  1:50 PM BOBBI Thao Jr., MD [facility]   9/18/2025 10:30 AM Hoang Del Toro MD [facility]   9/24/2025 11:45 AM Gabriel Blanchard MD [facility]   10/20/2025  2:20 PM Nadira Vasquez, APRN - CNP [facility]   1/6/2026  3:20 PM BOBBI Thao Jr., MD [facility]

## 2025-06-04 ENCOUNTER — HOSPITAL ENCOUNTER (OUTPATIENT)
Dept: PHYSICAL THERAPY | Age: 85
Setting detail: RECURRING SERIES
Discharge: HOME | End: 2025-06-07
Attending: PHYSICAL MEDICINE & REHABILITATION
Payer: MEDICARE

## 2025-06-04 PROCEDURE — 97530 THERAPEUTIC ACTIVITIES: CPT

## 2025-06-04 PROCEDURE — 97110 THERAPEUTIC EXERCISES: CPT

## 2025-06-04 NOTE — PROGRESS NOTES
Parameters Parameters    Education Diagnosis, prognosis, POC, HEP, anatomy/physiology of condition Expanded HEP     L stretch 10x HEP     warmup   OH press pole 2 x 5  Hip hinge 2 x 5  pole  Side stepping 5 x ea     STS 5x, 18 inch 20\"    2 X 10          3 min walk 365 ft w/ SPC, level terrain 2 x 80ft no AD  Focus on left heel strike   6 min walk 659  6 min walk   857 ft     Side stepping   2 x 80 ft      Step stretch    On treadmill x 10   Calf raises   HEP   On wedge x 10  Warm up   Nustep   6 min   Level 4  HR 97     Step tap (SL balance)  6\" step   2 x 30sec  HHA         THERAPEUTIC ACTIVITY: ( 23 minutes):    Therapeutic activities per grid below to improve mobility, strength, coordination, and dynamic movement of upper body, lower body, and trunk to improve functional lifting, carrying, reaching, catching, and overhead activites.  Required minimal visual, verbal, manual, and tactile cues to promote coordination of bilateral, upper extremity(s), lower extremity(s) and promote motor control of bilateral, upper extremity(s), lower extremity(s).     Date:  5/22/2025  Date:  6/2/25 Date:  6/4/25   Activity/Exercise Parameters Parameters Parameters         Hip hinge      Circuit   OH press 5 lbs 5 x  Serbian deadlift 15 lbs 5 x   10 standing marches  6 step jacks  2 counter push up    3 rounds    - 130   Box squat  3 x 10 5 lbs   8 lb @ 18\" x 2 x 5  5lb x 5 on 20\"   -123                           HEP Log Date    STS, L stretch 5/20/2025   2. side stepping, calf raises, STS 5/22/2025    3.    4.     5.            Treatment/Session Summary:      Treatment Assessment:     Pt with low functional capacity and requires prolonged rest breaks. Pt to continue to work on generalized LE strength . Pt to continue to build a HEP repository of circuits performed in the clinic.   Communication/Consultation:       None today   Equipment provided today: HEP see log above.

## 2025-06-09 ENCOUNTER — HOSPITAL ENCOUNTER (OUTPATIENT)
Dept: PHYSICAL THERAPY | Age: 85
Setting detail: RECURRING SERIES
Discharge: HOME | End: 2025-06-12
Attending: PHYSICAL MEDICINE & REHABILITATION
Payer: MEDICARE

## 2025-06-09 PROCEDURE — 97110 THERAPEUTIC EXERCISES: CPT

## 2025-06-09 PROCEDURE — 97530 THERAPEUTIC ACTIVITIES: CPT

## 2025-06-09 NOTE — PROGRESS NOTES
Estiven Colbert  : 1940  Primary: Medicare Part A And B (Medicare)  Secondary: PHYSICIANS [facility] @ [place] [address] [place] [address]  Phone: [phone]  Fax: [phone] Plan Frequency: 2x/wk for 12 weeks    Plan of Care/Certification Expiration Date: 25        Plan of Care/Certification Expiration Date:  Plan of Care/Certification Expiration Date: 25    Frequency/Duration: Plan Frequency: 2x/wk for 12 weeks      Time In/Out:   Time In: 1300  Time Out: 1345      PT Visit Info:         Visit Count:  5    OUTPATIENT PHYSICAL THERAPY:   Treatment Note 2025       Episode  (LBP)               Treatment Diagnosis:    Other low back pain  Muscle weakness (generalized)  Difficulty in walking, not elsewhere classified  History of falling  Medical/Referring Diagnosis:    Spondylosis of lumbar region without myelopathy or radiculopathy  Lumbar radiculopathy  Lumbar back pain      Referring Physician:  BOBBI Thao Jr., MD MD Orders:  PT Eval and Treat   Return MD Appt:  7/15/25   Date of Onset:  Onset Date:  (chronic with recent flare up)     Allergies:   Promethazine and Codeine  Restrictions/Precautions:   Fall Precautions: history of falling      Interventions Planned (Treatment may consist of any combination of the following):     See Assessment Note    Subjective Comments:   Has been walking up to 15 in [employer] with cart  Initial Pain Level:    0  /10  Post Session Pain Level:       /10  Medications Last Reviewed: 2025  Updated Objective Findings:    Treatment     THERAPEUTIC EXERCISE: ( 15 minutes):    Exercises per grid below to improve mobility, strength, balance, and coordination. Required minimal visual, verbal, manual, and tactile cues to promote proper body mechanics.  Progressed resistance and repetitions as indicated.     Date:  2025 Date:  2025  Date:  25 Date:  25 Date   25

## 2025-06-11 ENCOUNTER — HOSPITAL ENCOUNTER (OUTPATIENT)
Dept: PHYSICAL THERAPY | Age: 85
Setting detail: RECURRING SERIES
Discharge: HOME | End: 2025-06-14
Attending: PHYSICAL MEDICINE & REHABILITATION
Payer: MEDICARE

## 2025-06-11 PROCEDURE — 97110 THERAPEUTIC EXERCISES: CPT

## 2025-06-11 PROCEDURE — 97530 THERAPEUTIC ACTIVITIES: CPT

## 2025-06-16 ENCOUNTER — HOSPITAL ENCOUNTER (OUTPATIENT)
Dept: PHYSICAL THERAPY | Age: 85
Setting detail: RECURRING SERIES
Discharge: HOME | End: 2025-06-19
Attending: PHYSICAL MEDICINE & REHABILITATION
Payer: MEDICARE

## 2025-06-16 PROCEDURE — 97110 THERAPEUTIC EXERCISES: CPT

## 2025-06-16 PROCEDURE — 97530 THERAPEUTIC ACTIVITIES: CPT

## 2025-06-16 NOTE — PROGRESS NOTES
Estiven Colbert  : 1940  Primary: Medicare Part A And B (Medicare)  Secondary: PHYSICIANS [facility] @ [place] [address] [place] [place] [address]  Phone: [phone]  Fax: [phone] Plan Frequency: 2x/wk for 12 weeks    Plan of Care/Certification Expiration Date: 25        Plan of Care/Certification Expiration Date:  Plan of Care/Certification Expiration Date: 25    Frequency/Duration: Plan Frequency: 2x/wk for 12 weeks      Time In/Out:   Time In: 1303  Time Out: 1345      PT Visit Info:         Visit Count:  7    OUTPATIENT PHYSICAL THERAPY:   Treatment Note 2025       Episode  (LBP)               Treatment Diagnosis:    Other low back pain  Muscle weakness (generalized)  Difficulty in walking, not elsewhere classified  History of falling  Medical/Referring Diagnosis:    Spondylosis of lumbar region without myelopathy or radiculopathy  Lumbar radiculopathy  Lumbar back pain      Referring Physician:  BOBBI Thao Jr., MD MD Orders:  PT Eval and Treat   Return MD Appt:  7/15/25   Date of Onset:  Onset Date:  (chronic with recent flare up)     Allergies:   Promethazine and Codeine  Restrictions/Precautions:   Fall Precautions: history of falling      Interventions Planned (Treatment may consist of any combination of the following):     See Assessment Note    Subjective Comments:   Pt states that he fell in [employer] last week -- was walking in story without cane, and scuffed toe on floor.  Fell to ground hitting left arm and left side forehead.  Evaluated by MD right away. Skin abrasions but no other significant injuries.     Doing HEP consistently.  No c/o's  Initial Pain Level:    0  /10  Post Session Pain Level:       /10  Medications Last Reviewed: 2025  Updated Objective Findings:    Treatment     THERAPEUTIC EXERCISE: ( 10 minutes):    Exercises per grid below to improve mobility, strength, balance, and coordination.

## 2025-06-18 ENCOUNTER — HOSPITAL ENCOUNTER (OUTPATIENT)
Dept: PHYSICAL THERAPY | Age: 85
Setting detail: RECURRING SERIES
Discharge: HOME | End: 2025-06-21
Attending: PHYSICAL MEDICINE & REHABILITATION
Payer: MEDICARE

## 2025-06-18 PROCEDURE — 97530 THERAPEUTIC ACTIVITIES: CPT

## 2025-06-18 PROCEDURE — 97110 THERAPEUTIC EXERCISES: CPT

## 2025-06-18 NOTE — PROGRESS NOTES
Estiven Colbert  : 1940  Primary: Medicare Part A And B (Medicare)  Secondary: PHYSICIANS [facility] @ [place] [address] [place] [address]  Phone: [phone]  Fax: [phone] Plan Frequency: 2x/wk for 12 weeks    Plan of Care/Certification Expiration Date: 25        Plan of Care/Certification Expiration Date:  Plan of Care/Certification Expiration Date: 25    Frequency/Duration: Plan Frequency: 2x/wk for 12 weeks      Time In/Out:   Time In: 1515  Time Out: 1600      PT Visit Info:         Visit Count:  8    OUTPATIENT PHYSICAL THERAPY:   Treatment Note 2025       Episode  (LBP)               Treatment Diagnosis:    Other low back pain  Muscle weakness (generalized)  Difficulty in walking, not elsewhere classified  History of falling  Medical/Referring Diagnosis:    Spondylosis of lumbar region without myelopathy or radiculopathy  Lumbar radiculopathy  Lumbar back pain      Referring Physician:  BOBBI Thao Jr., MD MD Orders:  PT Eval and Treat   Return MD Appt:  7/15/25   Date of Onset:  Onset Date:  (chronic with recent flare up)     Allergies:   Promethazine and Codeine  Restrictions/Precautions:   Fall Precautions: history of falling      Interventions Planned (Treatment may consist of any combination of the following):     See Assessment Note    Subjective Comments:   Pt reports he is feeling pretty good today  Doing HEP consistently.  No c/o's  Initial Pain Level:    0  /10  Post Session Pain Level:       /10  Medications Last Reviewed: 2025  Updated Objective Findings:    Treatment     THERAPEUTIC EXERCISE: ( 8 minutes):    Exercises per grid below to improve mobility, strength, balance, and coordination. Required minimal visual, verbal, manual, and tactile cues to promote proper body mechanics.  Progressed resistance and repetitions as indicated.     Date:  2025 Date:  2025  Date:  25 Date:  25

## 2025-06-23 ENCOUNTER — HOSPITAL ENCOUNTER (OUTPATIENT)
Dept: PHYSICAL THERAPY | Age: 85
Setting detail: RECURRING SERIES
End: 2025-06-23
Attending: PHYSICAL MEDICINE & REHABILITATION
Payer: MEDICARE

## 2025-06-23 NOTE — PROGRESS NOTES
Estiven Colbert  : 1940  Primary: Medicare Part A And B  Secondary: PHYSICIANS [facility] @ [place] [address] [place] [place] [address]  Phone: [phone]  Fax: [phone]    PT Visit Info:    No data recorded   OT Visit Info:  No data recorded    OUTPATIENT THERAPY: 2025  Episode  Appt Desk        Estiven Colbert cancelled  his appointment for today.  Will plan to follow up next during next appointment.        Thank you,  Susannah Rodriguez, PT    Future Appointments   Date Time Provider Department Center   2025  3:15 PM Luz Jain, PT [facility]   2025  1:45 PM Susannah Rodriguez, PT [facility]   7/15/2025  1:50 PM BOBBI Thao Jr., MD [facility] [facility]   2025 10:30 AM Hoang Del Toro MD [facility]   2025 11:45 AM Gabriel Blanchard MD [facility]   10/20/2025  2:20 PM Nadira Vasquez, APRN - CNP [facility]   2026  3:20 PM BOBBI Thao Jr., MD [facility]

## 2025-06-25 ENCOUNTER — HOSPITAL ENCOUNTER (OUTPATIENT)
Dept: PHYSICAL THERAPY | Age: 85
Setting detail: RECURRING SERIES
Discharge: HOME | End: 2025-06-28
Attending: PHYSICAL MEDICINE & REHABILITATION
Payer: MEDICARE

## 2025-06-25 PROCEDURE — 97110 THERAPEUTIC EXERCISES: CPT

## 2025-06-25 PROCEDURE — 97530 THERAPEUTIC ACTIVITIES: CPT

## 2025-06-25 NOTE — PROGRESS NOTES
Estiven Colbert  : 1940  Primary: Medicare Part A And B (Medicare)  Secondary: PHYSICIANS [facility] @ [place] [address] [address] [place] [place] [address]  Phone: [phone]  Fax: [phone] Plan Frequency: 2x/wk for 12 weeks    Plan of Care/Certification Expiration Date: 25        Plan of Care/Certification Expiration Date:  Plan of Care/Certification Expiration Date: 25    Frequency/Duration: Plan Frequency: 2x/wk for 12 weeks      Time In/Out:   Time In: 1515  Time Out: 1600      PT Visit Info:         Visit Count:  9    OUTPATIENT PHYSICAL THERAPY:   Treatment Note 2025       Episode  (LBP)               Treatment Diagnosis:    Other low back pain  Muscle weakness (generalized)  Difficulty in walking, not elsewhere classified  History of falling  Medical/Referring Diagnosis:    Spondylosis of lumbar region without myelopathy or radiculopathy  Lumbar radiculopathy  Lumbar back pain      Referring Physician:  BOBBI Thao Jr., MD MD Orders:  PT Eval and Treat   Return MD Appt:  7/15/25   Date of Onset:  Onset Date:  (chronic with recent flare up)     Allergies:   Promethazine and Codeine  Restrictions/Precautions:   Fall Precautions: history of falling      Interventions Planned (Treatment may consist of any combination of the following):     See Assessment Note    Subjective Comments:   Pt reports he is feeling a bit recovered from his episode of vertigo.  Initial Pain Level:    0  /10  Post Session Pain Level:       /10  Medications Last Reviewed: 2025  Updated Objective Findings:    Treatment     THERAPEUTIC EXERCISE: ( 8 minutes):    Exercises per grid below to improve mobility, strength, balance, and coordination. Required minimal visual, verbal, manual, and tactile cues to promote proper body mechanics.  Progressed resistance and repetitions as indicated.     Date:  2025 Date:  2025  Date:  25 Date:  25 Date

## 2025-06-30 ENCOUNTER — HOSPITAL ENCOUNTER (OUTPATIENT)
Dept: PHYSICAL THERAPY | Age: 85
Setting detail: RECURRING SERIES
Discharge: HOME | End: 2025-07-03
Attending: PHYSICAL MEDICINE & REHABILITATION
Payer: MEDICARE

## 2025-06-30 PROCEDURE — 97530 THERAPEUTIC ACTIVITIES: CPT

## 2025-06-30 PROCEDURE — 97110 THERAPEUTIC EXERCISES: CPT

## 2025-06-30 NOTE — PROGRESS NOTES
Estiven Colbert  : 1940  Primary: Medicare Part A And B (Medicare)  Secondary: PHYSICIANS [facility] @ [place] [address] [place] [address]  Phone: [phone]  Fax: [phone] Plan Frequency: 2x/wk for 12 weeks    Plan of Care/Certification Expiration Date: 25        Plan of Care/Certification Expiration Date:  Plan of Care/Certification Expiration Date: 25    Frequency/Duration: Plan Frequency: 2x/wk for 12 weeks      Time In/Out:   Time In: 1345  Time Out: 1430      PT Visit Info:         Visit Count:  10    OUTPATIENT PHYSICAL THERAPY:   Treatment Note 2025       Episode  (LBP)               Treatment Diagnosis:    Other low back pain  Muscle weakness (generalized)  Difficulty in walking, not elsewhere classified  History of falling  Medical/Referring Diagnosis:    Spondylosis of lumbar region without myelopathy or radiculopathy  Lumbar radiculopathy  Lumbar back pain      Referring Physician:  BOBBI Thao Jr., MD MD Orders:  PT Eval and Treat   Return MD Appt:  7/15/25   Date of Onset:  Onset Date:  (chronic with recent flare up)     Allergies:   Promethazine and Codeine  Restrictions/Precautions:   Fall Precautions: history of falling      Interventions Planned (Treatment may consist of any combination of the following):     See Assessment Note    Subjective Comments:   SHEILA 3  Initial Pain Level:    0  /10  Post Session Pain Level:       /10  Medications Last Reviewed: 2025  Updated Objective Findings:  see progress report from today  Treatment     THERAPEUTIC EXERCISE: ( 8 minutes):    Exercises per grid below to improve mobility, strength, balance, and coordination. Required minimal visual, verbal, manual, and tactile cues to promote proper body mechanics.  Progressed resistance and repetitions as indicated.     2025 Date:  25  Date:  25 Date  25   Activity/Exercise         Education 
participate in ADLs, IADLs, and functional activities by increasing safety and decreasing assistance required.    Estiven Colbert continues to require skilled intervention due to patient continues to present with impairments assessed at initial evaluation and requiring skilled physical therapy to meet goals for PT.        Regarding Estiven Colbert's therapy, I certify that the treatment plan above will be carried out by a therapist or under their direction.  Thank you for this referral,  Susannah Rodriguez, PT     Referring Physician Signature: BOBBI Thao Jr., *                    Charge Capture  Events  Appt Desk  Attendance Report

## 2025-07-02 ENCOUNTER — HOSPITAL ENCOUNTER (OUTPATIENT)
Dept: PHYSICAL THERAPY | Age: 85
Setting detail: RECURRING SERIES
Discharge: HOME | End: 2025-07-05
Attending: PHYSICAL MEDICINE & REHABILITATION
Payer: MEDICARE

## 2025-07-02 PROCEDURE — 97110 THERAPEUTIC EXERCISES: CPT

## 2025-07-02 PROCEDURE — 97530 THERAPEUTIC ACTIVITIES: CPT

## 2025-07-02 NOTE — PROGRESS NOTES
press 6 lbs  5 x ea side 3 rounds   RDL 15 lbs 5 x  20 lbs 3 x 5   Finish with 100' farmer carry 10lbs ea hand   Warm up 5 x STS  10 step elvin  5 wall pushups    10 STS  20 step jacks  10 wall pushups  Split times: 3.28, 2.53, 4.30 min   Total time with breaks   16.41 min   , 125, 122    Box squat  10 lbs STS 18\"      DBL DB   5 lb x 3 x 5 reps  HR      Step back lunge   2 x 8reps  Unilateral UE support                              HEP Log Date    STS, L stretch 5/20/2025   2. side stepping, calf raises, STS 5/22/2025    3.    4.     5.            Treatment/Session Summary:      Treatment Assessment:    Able to progress to HIT circuit training today. Performed dynamic balance activity with standby supervision   Communication/Consultation:       None today   Equipment provided today: HEP see log above.    Recommendations/Intent for next  treatment session:  Next visit will focus on improving mobility, strength, pain science,          >Total Treatment Billable Duration:  40min  5 min rest        Susannah Rodriguez, PT         Charge Capture  Events  [employer] Portal  Appt Desk  Attendance Report     Future Appointments   Date Time Provider Department Center   7/8/2025  1:15 PM Luz Jain, PT [facility]   7/14/2025  9:30 AM Susannah Rodriguez, PT [facility]   7/15/2025  1:50 PM BOBBI Thao Jr., MD [facility]   7/17/2025  9:45 AM Susannah Rodriguez, PT [facility]   7/23/2025 10:30 AM Luz Jain, PT [facility]   7/29/2025 11:15 AM Susannah Rodriguez, PT [facility]   7/31/2025  1:45 PM Susannah Rodriguez, PT [facility]   8/4/2025  2:30 PM Susannah Rodriguez, PT [facility]   8/6/2025  2:30 PM Susannah Rodriguez, PT [facility]   8/11/2025  1:00 PM Susannah Rodriguez, PT [facility]   9/18/2025 10:30 AM Hoang Del Toro MD [facility]   9/24/2025 11:45 AM Gabriel Blanchard MD [facility]   10/20/2025  2:20 PM Nadira Vasquez, APRN - CNP [facility]   1/6/2026  3:20  No

## 2025-07-08 ENCOUNTER — HOSPITAL ENCOUNTER (OUTPATIENT)
Dept: PHYSICAL THERAPY | Age: 85
Setting detail: RECURRING SERIES
Discharge: HOME | End: 2025-07-11
Attending: PHYSICAL MEDICINE & REHABILITATION
Payer: MEDICARE

## 2025-07-08 ENCOUNTER — APPOINTMENT (OUTPATIENT)
Dept: PHYSICAL THERAPY | Age: 85
End: 2025-07-08
Attending: PHYSICAL MEDICINE & REHABILITATION
Payer: MEDICARE

## 2025-07-08 PROCEDURE — 97110 THERAPEUTIC EXERCISES: CPT

## 2025-07-08 NOTE — PROGRESS NOTES
Estiven Colbert  : 1940  Primary: Medicare Part A And B (Medicare)  Secondary: PHYSICIANS [facility] @ [place] [address] [place] [address]  Phone: [phone]  Fax: [phone] Plan Frequency: 2x/wk for 12 weeks    Plan of Care/Certification Expiration Date: 25        Plan of Care/Certification Expiration Date:  Plan of Care/Certification Expiration Date: 25    Frequency/Duration: Plan Frequency: 2x/wk for 12 weeks      Time In/Out:   Time In: 1315  Time Out: 1400      PT Visit Info:         Visit Count:  12    OUTPATIENT PHYSICAL THERAPY:   Treatment Note 2025       Episode  (LBP)               Treatment Diagnosis:    Other low back pain  Muscle weakness (generalized)  Difficulty in walking, not elsewhere classified  History of falling  Medical/Referring Diagnosis:    Spondylosis of lumbar region without myelopathy or radiculopathy  Lumbar radiculopathy  Lumbar back pain      Referring Physician:  BOBBI Thao Jr., MD MD Orders:  PT Eval and Treat   Return MD Appt:  7/15/25   Date of Onset:  Onset Date:  (chronic with recent flare up)     Allergies:   Promethazine and Codeine  Restrictions/Precautions:   Fall Precautions: history of falling      Interventions Planned (Treatment may consist of any combination of the following):     See Assessment Note    Subjective Comments:   Feels ok today. Pt did have a fall over the weekend when he was climbing a curb and the cane went out from under him.    Initial Pain Level:    0  /10  Post Session Pain Level:       /10  Medications Last Reviewed: 2025  Updated Objective Findings:  see progress report from today  Treatment     THERAPEUTIC EXERCISE: ( 40 minutes):    Exercises per grid below to improve mobility, strength, balance, and coordination. Required minimal visual, verbal, manual, and tactile cues to promote proper body mechanics.  Progressed resistance and repetitions as

## 2025-07-14 ENCOUNTER — HOSPITAL ENCOUNTER (OUTPATIENT)
Dept: PHYSICAL THERAPY | Age: 85
Setting detail: RECURRING SERIES
Discharge: HOME | End: 2025-07-17
Attending: PHYSICAL MEDICINE & REHABILITATION
Payer: MEDICARE

## 2025-07-14 PROCEDURE — 97530 THERAPEUTIC ACTIVITIES: CPT

## 2025-07-14 PROCEDURE — 97110 THERAPEUTIC EXERCISES: CPT

## 2025-07-15 ENCOUNTER — OFFICE VISIT (OUTPATIENT)
Dept: ORTHOPEDIC SURGERY | Age: 85
End: 2025-07-15
Payer: MEDICARE

## 2025-07-15 DIAGNOSIS — M47.816 SPONDYLOSIS OF LUMBAR REGION WITHOUT MYELOPATHY OR RADICULOPATHY: Primary | ICD-10-CM

## 2025-07-15 DIAGNOSIS — M54.16 LUMBAR RADICULOPATHY: ICD-10-CM

## 2025-07-15 PROCEDURE — 1123F ACP DISCUSS/DSCN MKR DOCD: CPT | Performed by: PHYSICAL MEDICINE & REHABILITATION

## 2025-07-15 PROCEDURE — 99214 OFFICE O/P EST MOD 30 MIN: CPT | Performed by: PHYSICAL MEDICINE & REHABILITATION

## 2025-07-15 PROCEDURE — G8417 CALC BMI ABV UP PARAM F/U: HCPCS | Performed by: PHYSICAL MEDICINE & REHABILITATION

## 2025-07-15 PROCEDURE — G8428 CUR MEDS NOT DOCUMENT: HCPCS | Performed by: PHYSICAL MEDICINE & REHABILITATION

## 2025-07-15 PROCEDURE — G2211 COMPLEX E/M VISIT ADD ON: HCPCS | Performed by: PHYSICAL MEDICINE & REHABILITATION

## 2025-07-15 PROCEDURE — 1036F TOBACCO NON-USER: CPT | Performed by: PHYSICAL MEDICINE & REHABILITATION

## 2025-07-15 NOTE — PROGRESS NOTES
well.  We do not need to reimage unless he feels that spine surgical options would be a consideration.  The main issue would be if he develops progressive weakness in the right leg.  Right now that does not appear to be the case for at least to the degree to pursue more aggressive interventions.    2.    Imbalance.  At this age group typically difficult to treat, is undergoing therapies for this.  Needs to exercise caution when he ambulates.  Uses cane.  The family is done all of the preemptive work around the house such as removing area rugs, etc.  Again I stressed need to be extremely careful.  If he feels that some of the falls could be coming from weakness in the right leg that I would recommend an AFO and he may call me anytime he wishes to pursue that.  Right now he does not think he needs that    I will see him back in 8 months for continuity of care but sooner if need be.    Clinical Notes:   I/my clinic will serve as the continuing focal point for all needed health care services and/or medical care services that are part of ongoing PAIN care related to patient's single, serious, or complex PAIN condition with the following diagnoses:   Lumbar spondylosis and radiculopathy with ongoing injection therapies.   Chronic Pain Condition that is not stable = not at the patient's treatment goal     Elements of this note/dictation were created using speech recognition software.  As a result, some errors of speech recognition may be noted throughout the narrative.    BOBBI PATEL JR, MD

## 2025-07-17 ENCOUNTER — APPOINTMENT (OUTPATIENT)
Dept: PHYSICAL THERAPY | Age: 85
End: 2025-07-17
Attending: PHYSICAL MEDICINE & REHABILITATION
Payer: MEDICARE

## 2025-07-21 ENCOUNTER — HOSPITAL ENCOUNTER (OUTPATIENT)
Dept: PHYSICAL THERAPY | Age: 85
Setting detail: RECURRING SERIES
Discharge: HOME | End: 2025-07-24
Attending: PHYSICAL MEDICINE & REHABILITATION
Payer: MEDICARE

## 2025-07-21 PROCEDURE — 97530 THERAPEUTIC ACTIVITIES: CPT

## 2025-07-21 PROCEDURE — 97110 THERAPEUTIC EXERCISES: CPT

## 2025-07-23 ENCOUNTER — HOSPITAL ENCOUNTER (OUTPATIENT)
Dept: PHYSICAL THERAPY | Age: 85
Setting detail: RECURRING SERIES
Discharge: HOME | End: 2025-07-26
Attending: PHYSICAL MEDICINE & REHABILITATION
Payer: MEDICARE

## 2025-07-23 PROCEDURE — 97110 THERAPEUTIC EXERCISES: CPT

## 2025-07-23 PROCEDURE — 97530 THERAPEUTIC ACTIVITIES: CPT

## 2025-07-29 ENCOUNTER — HOSPITAL ENCOUNTER (OUTPATIENT)
Dept: PHYSICAL THERAPY | Age: 85
Setting detail: RECURRING SERIES
Discharge: HOME | End: 2025-08-01
Attending: PHYSICAL MEDICINE & REHABILITATION
Payer: MEDICARE

## 2025-07-29 PROCEDURE — 97530 THERAPEUTIC ACTIVITIES: CPT

## 2025-07-29 PROCEDURE — 97110 THERAPEUTIC EXERCISES: CPT

## 2025-07-31 ENCOUNTER — HOSPITAL ENCOUNTER (OUTPATIENT)
Dept: PHYSICAL THERAPY | Age: 85
Setting detail: RECURRING SERIES
End: 2025-07-31
Attending: PHYSICAL MEDICINE & REHABILITATION
Payer: MEDICARE

## 2025-07-31 PROCEDURE — 97530 THERAPEUTIC ACTIVITIES: CPT

## 2025-07-31 PROCEDURE — 97110 THERAPEUTIC EXERCISES: CPT

## 2025-08-04 ENCOUNTER — HOSPITAL ENCOUNTER (OUTPATIENT)
Dept: PHYSICAL THERAPY | Age: 85
Setting detail: RECURRING SERIES
Discharge: HOME | End: 2025-08-07
Attending: PHYSICAL MEDICINE & REHABILITATION
Payer: MEDICARE

## 2025-08-04 PROCEDURE — 97530 THERAPEUTIC ACTIVITIES: CPT

## 2025-08-04 PROCEDURE — 97110 THERAPEUTIC EXERCISES: CPT

## 2025-08-06 ENCOUNTER — HOSPITAL ENCOUNTER (OUTPATIENT)
Dept: PHYSICAL THERAPY | Age: 85
Setting detail: RECURRING SERIES
Discharge: HOME | End: 2025-08-09
Attending: PHYSICAL MEDICINE & REHABILITATION
Payer: MEDICARE

## 2025-08-06 PROCEDURE — 97110 THERAPEUTIC EXERCISES: CPT

## 2025-08-06 PROCEDURE — 97530 THERAPEUTIC ACTIVITIES: CPT

## 2025-08-11 ENCOUNTER — HOSPITAL ENCOUNTER (OUTPATIENT)
Dept: PHYSICAL THERAPY | Age: 85
Setting detail: RECURRING SERIES
Discharge: HOME | End: 2025-08-14
Attending: PHYSICAL MEDICINE & REHABILITATION
Payer: MEDICARE

## 2025-08-11 PROCEDURE — 97110 THERAPEUTIC EXERCISES: CPT

## 2025-08-11 PROCEDURE — 97530 THERAPEUTIC ACTIVITIES: CPT

## 2025-08-13 ENCOUNTER — HOSPITAL ENCOUNTER (OUTPATIENT)
Dept: PHYSICAL THERAPY | Age: 85
Setting detail: RECURRING SERIES
Discharge: HOME | End: 2025-08-16
Attending: PHYSICAL MEDICINE & REHABILITATION
Payer: MEDICARE

## 2025-08-13 PROCEDURE — 97110 THERAPEUTIC EXERCISES: CPT

## 2025-08-20 ENCOUNTER — HOSPITAL ENCOUNTER (OUTPATIENT)
Dept: PHYSICAL THERAPY | Age: 85
Setting detail: RECURRING SERIES
Discharge: HOME | End: 2025-08-23
Attending: PHYSICAL MEDICINE & REHABILITATION
Payer: MEDICARE

## 2025-08-20 PROCEDURE — 97530 THERAPEUTIC ACTIVITIES: CPT

## 2025-08-20 PROCEDURE — 97110 THERAPEUTIC EXERCISES: CPT

## 2025-08-22 ENCOUNTER — HOSPITAL ENCOUNTER (OUTPATIENT)
Dept: PHYSICAL THERAPY | Age: 85
Setting detail: RECURRING SERIES
Discharge: HOME | End: 2025-08-25
Attending: PHYSICAL MEDICINE & REHABILITATION
Payer: MEDICARE

## 2025-08-22 PROCEDURE — 97110 THERAPEUTIC EXERCISES: CPT

## 2025-08-22 PROCEDURE — 97530 THERAPEUTIC ACTIVITIES: CPT

## 2025-08-27 ENCOUNTER — HOSPITAL ENCOUNTER (OUTPATIENT)
Dept: PHYSICAL THERAPY | Age: 85
Setting detail: RECURRING SERIES
Discharge: HOME | End: 2025-08-30
Attending: PHYSICAL MEDICINE & REHABILITATION
Payer: MEDICARE

## 2025-08-27 PROCEDURE — 97530 THERAPEUTIC ACTIVITIES: CPT

## 2025-08-27 PROCEDURE — 97110 THERAPEUTIC EXERCISES: CPT

## 2025-09-03 ENCOUNTER — HOSPITAL ENCOUNTER (OUTPATIENT)
Dept: PHYSICAL THERAPY | Age: 85
Setting detail: RECURRING SERIES
Discharge: HOME | End: 2025-09-06
Attending: PHYSICAL MEDICINE & REHABILITATION
Payer: MEDICARE

## 2025-09-03 PROCEDURE — 97530 THERAPEUTIC ACTIVITIES: CPT

## 2025-09-03 PROCEDURE — 97110 THERAPEUTIC EXERCISES: CPT

## (undated) DEVICE — SOLUTION IV 1000ML 0.9% SOD CHL

## (undated) DEVICE — 2108 SERIES SAGITTAL BLADE (24.8 X 0.88 X 90.5MM)

## (undated) DEVICE — SUTURE MCRYL SZ 0 L27IN ABSRB VLT CT-1 L36MM 1/2 CIR TAPR Y340H

## (undated) DEVICE — UPPER EXTREMITY: Brand: MEDLINE INDUSTRIES, INC.

## (undated) DEVICE — SHEET, DRAPE, SPLIT, STERILE: Brand: MEDLINE

## (undated) DEVICE — ABDOMINAL PAD: Brand: DERMACEA

## (undated) DEVICE — (D)PREP SKN CHLRAPRP APPL 26ML -- CONVERT TO ITEM 371833

## (undated) DEVICE — DRAPE SHT 3 QTR PROXIMA 53X77 --

## (undated) DEVICE — BIT DRL L270MM DIA3.8MM 3 FLUT QUIK CPL NONRADIOLUCENT W/O

## (undated) DEVICE — RETRIEVER SUT ARTHSCP HOFFEE --

## (undated) DEVICE — BANDAGE E W6INXL11YD CLP CLSR DBL LEN FLEX-MASTER

## (undated) DEVICE — DRESSING IN STRIPPABLE ENVELOPE: Brand: DERMACEA

## (undated) DEVICE — SUTURE MCRYL SZ 2-0 L27IN ABSRB UD SH L26MM TAPERPOINT NDL Y417H

## (undated) DEVICE — FAN SPRAY KIT: Brand: PULSAVAC®

## (undated) DEVICE — 2000CC GUARDIAN II: Brand: GUARDIAN

## (undated) DEVICE — INTENDED FOR TISSUE SEPARATION, AND OTHER PROCEDURES THAT REQUIRE A SHARP SURGICAL BLADE TO PUNCTURE OR CUT.: Brand: BARD-PARKER SAFETY BLADES SIZE 10, STERILE

## (undated) DEVICE — INTENDED FOR TISSUE SEPARATION, AND OTHER PROCEDURES THAT REQUIRE A SHARP SURGICAL BLADE TO PUNCTURE OR CUT.: Brand: BARD-PARKER ® STAINLESS STEEL BLADES

## (undated) DEVICE — 7 DAY SILVER-COATED ANTIMICROBIAL BARRIER DRESSING: Brand: ACTICOAT 7  4" X 5"

## (undated) DEVICE — SOLUTION IRRIG 3000ML 0.9% SOD CHL FLX CONT 0797208] ICU MEDICAL INC]

## (undated) DEVICE — REM POLYHESIVE ADULT PATIENT RETURN ELECTRODE: Brand: VALLEYLAB

## (undated) DEVICE — DRAPE C-ARMOUR C-ARM KIT --

## (undated) DEVICE — AMD ANTIMICROBIAL GAUZE SPONGES,12 PLY USP TYPE VII, 0.2% POLYHEXAMETHYLENE BIGUANIDE HCI (PHMB): Brand: CURITY

## (undated) DEVICE — DERMABOND SKIN ADH 0.7ML -- DERMABOND ADVANCED 12/BX

## (undated) DEVICE — 3M™ IOBAN™ 2 ANTIMICROBIAL INCISE DRAPE 6648EZ: Brand: IOBAN™ 2

## (undated) DEVICE — SUTURE 5 MERS GRN 30 TO 40 IN D9211

## (undated) DEVICE — 1010 S-DRAPE TOWEL DRAPE 10/BX: Brand: STERI-DRAPE™

## (undated) DEVICE — SLIM BODY SKIN STAPLER: Brand: APPOSE ULC

## (undated) DEVICE — IMMOBILIZER KNEE PREMIER PRO TRI PNL 24INCH FOAM TIETEX PAT

## (undated) DEVICE — 3M™ COBAN™ NL STERILE NON-LATEX SELF-ADHERENT WRAP, 2086S, 6 IN X 5 YD (15 CM X 4,5 M), 12 ROLLS/CASE: Brand: 3M™ COBAN™

## (undated) DEVICE — DRAPE,U/SHT,SPLIT,FILM,60X84,STERILE: Brand: MEDLINE

## (undated) DEVICE — SUTURE MCRYL SZ 2-0 L27IN ABSRB VLT CT-1 L36MM 1/2 CIR TAPR Y339H

## (undated) DEVICE — SUTURE PDS II SZ 1 L96IN ABSRB VLT TP-1 L65MM 1/2 CIR Z880G

## (undated) DEVICE — Device

## (undated) DEVICE — STOCKINET,IMPERVIOUS,6X30: Brand: MEDLINE

## (undated) DEVICE — IMMOBILIZER SHOULDER SLING SWATHE DLX

## (undated) DEVICE — BANDAGE COBAN 4 IN COMPR W4INXL5YD FOAM COHESIVE QUIK STK SELF ADH SFT

## (undated) DEVICE — ROD RMR L650MM DIA2.5MM W/ EXTN BALL TIP

## (undated) DEVICE — X-LARGE COTTON GLOVE: Brand: DEROYAL

## (undated) DEVICE — BIT DRL DIA6.5MM NONSTERILE COUNTSINK CONIC STP

## (undated) DEVICE — DRAPE TBL W72XH34IN D30IN SGL PC DISPOSABLE

## (undated) DEVICE — 3000CC GUARDIAN II: Brand: GUARDIAN

## (undated) DEVICE — PREP SKN CHLRAPRP APL 26ML STR --